# Patient Record
Sex: MALE | Race: WHITE | NOT HISPANIC OR LATINO | Employment: OTHER | URBAN - METROPOLITAN AREA
[De-identification: names, ages, dates, MRNs, and addresses within clinical notes are randomized per-mention and may not be internally consistent; named-entity substitution may affect disease eponyms.]

---

## 2017-02-08 ENCOUNTER — OFFICE VISIT (OUTPATIENT)
Dept: INTERNAL MEDICINE | Facility: CLINIC | Age: 62
End: 2017-02-08

## 2017-02-08 VITALS
HEART RATE: 82 BPM | TEMPERATURE: 97.8 F | BODY MASS INDEX: 26.69 KG/M2 | DIASTOLIC BLOOD PRESSURE: 82 MMHG | RESPIRATION RATE: 16 BRPM | OXYGEN SATURATION: 97 % | SYSTOLIC BLOOD PRESSURE: 122 MMHG | WEIGHT: 186 LBS

## 2017-02-08 DIAGNOSIS — M54.6 TRIGGER POINT OF THORACIC REGION: Primary | ICD-10-CM

## 2017-02-08 DIAGNOSIS — M79.18 MUSCULOSKELETAL PAIN: ICD-10-CM

## 2017-02-08 PROCEDURE — 99213 OFFICE O/P EST LOW 20 MIN: CPT | Performed by: NURSE PRACTITIONER

## 2017-02-08 RX ORDER — CYCLOBENZAPRINE HYDROCHLORIDE 7.5 MG/1
7.5 TABLET, FILM COATED ORAL 3 TIMES DAILY PRN
Qty: 30 TABLET | Refills: 0 | Status: SHIPPED | OUTPATIENT
Start: 2017-02-08 | End: 2017-03-01

## 2017-02-08 NOTE — PROGRESS NOTES
Vitals:    02/08/17 1435   BP: 122/82   Pulse: 82   Resp: 16   Temp: 97.8 °F (36.6 °C)   SpO2: 97%     Last 2 weights    02/08/17  1435   Weight: 186 lb (84.4 kg)     Social History   Substance Use Topics   • Smoking status: Never Smoker   • Smokeless tobacco: Not on file   • Alcohol use Yes       Subjective     HPI  Pt presents to office today with right sided sub scapular pain that started occurring 2-3 weeks ago. When moving right arm in certain direction with cause pain to radiate along the right lateral side of pt. He states he may have push/pulled a drill that was heavier than he anticipated and could be the cause of the injury. He denies rashes, fever, elevation in BP. Pt has full ROM still and able to perform daily activities. Denies numbness, tingling, or loss of sensation. He has not taken anything OTC for pain, and has not tried heat therapy.    The following portions of the patient's history were reviewed and updated as appropriate: allergies, current medications, past medical history, past social history and problem list.    Review of Systems   Constitutional: Negative.    Respiratory: Negative.    Cardiovascular: Negative.    Musculoskeletal:        Right subscapular pain       Objective     Physical Exam   Constitutional: He is oriented to person, place, and time. He appears well-developed and well-nourished.   HENT:   Head: Normocephalic and atraumatic.   Neck: Normal range of motion.   Cardiovascular: Normal rate, regular rhythm and normal heart sounds.    Pulmonary/Chest: Effort normal and breath sounds normal.   Musculoskeletal: Normal range of motion.        Thoracic back: He exhibits tenderness and pain.        Back:    trigger point musculoskeletal pain. Able to reproduce pain and feel the tension of the muscle under scapula   Neurological: He is alert and oriented to person, place, and time.   Nursing note and vitals reviewed.      Assessment/Plan   Shankar was seen today for back pain and  shoulder pain.    Diagnoses and all orders for this visit:    Trigger point of thoracic region    Musculoskeletal pain    Other orders  -     cyclobenzaprine (FEXMID) 7.5 MG tablet; Take 1 tablet by mouth 3 (Three) Times a Day As Needed for muscle spasms.           -flexeril tid prn  -heating pad  -avoid heavy lifting, pulling, or pushing  -gentle stretches to loosen muscle  -in a couple week try massage therapy  -cont home meds  -FU prn or if symptoms persist/worsen

## 2017-03-01 ENCOUNTER — OFFICE VISIT (OUTPATIENT)
Dept: INTERNAL MEDICINE | Facility: CLINIC | Age: 62
End: 2017-03-01

## 2017-03-01 VITALS
SYSTOLIC BLOOD PRESSURE: 132 MMHG | DIASTOLIC BLOOD PRESSURE: 78 MMHG | BODY MASS INDEX: 26.26 KG/M2 | RESPIRATION RATE: 16 BRPM | WEIGHT: 183 LBS | TEMPERATURE: 97.6 F | HEART RATE: 84 BPM

## 2017-03-01 DIAGNOSIS — E78.49 OTHER HYPERLIPIDEMIA: ICD-10-CM

## 2017-03-01 DIAGNOSIS — I10 ESSENTIAL HYPERTENSION: ICD-10-CM

## 2017-03-01 DIAGNOSIS — Z00.00 HEALTH CARE MAINTENANCE: Primary | ICD-10-CM

## 2017-03-01 DIAGNOSIS — M50.30 DDD (DEGENERATIVE DISC DISEASE), CERVICAL: ICD-10-CM

## 2017-03-01 DIAGNOSIS — E11.9 NON-INSULIN DEPENDENT TYPE 2 DIABETES MELLITUS (HCC): ICD-10-CM

## 2017-03-01 PROCEDURE — 90715 TDAP VACCINE 7 YRS/> IM: CPT | Performed by: INTERNAL MEDICINE

## 2017-03-01 PROCEDURE — 90472 IMMUNIZATION ADMIN EACH ADD: CPT | Performed by: INTERNAL MEDICINE

## 2017-03-01 PROCEDURE — 90670 PCV13 VACCINE IM: CPT | Performed by: INTERNAL MEDICINE

## 2017-03-01 PROCEDURE — 90471 IMMUNIZATION ADMIN: CPT | Performed by: INTERNAL MEDICINE

## 2017-03-01 PROCEDURE — 99214 OFFICE O/P EST MOD 30 MIN: CPT | Performed by: INTERNAL MEDICINE

## 2017-03-01 PROCEDURE — 99396 PREV VISIT EST AGE 40-64: CPT | Performed by: INTERNAL MEDICINE

## 2017-03-01 NOTE — PROGRESS NOTES
Subjective   Shankar Daley is a 61 y.o. male.   He is here today for complete physical exam along with NIDDM as well as hyperlipidemia hypertension cervical DDD and he has no new complaints  History of Present Illness   He is here today for complete physical exam along with NIDDM as well as hyperlipidemia hypertension cervical DDD and he has no new complaints  The following portions of the patient's history were reviewed and updated as appropriate: allergies, current medications, past family history, past medical history, past social history, past surgical history and problem list.    Review of Systems   All other systems reviewed and are negative.      Objective   Physical Exam   Constitutional: He is oriented to person, place, and time. Vital signs are normal. He appears well-developed and well-nourished. He is active.   HENT:   Head: Normocephalic and atraumatic.   Right Ear: Hearing, tympanic membrane, external ear and ear canal normal.   Left Ear: Hearing, tympanic membrane, external ear and ear canal normal.   Nose: Nose normal.   Mouth/Throat: Oropharynx is clear and moist.   Eyes: Conjunctivae, EOM and lids are normal. Pupils are equal, round, and reactive to light. Right eye exhibits no discharge. Left eye exhibits no discharge.   Neck: Trachea normal, normal range of motion, full passive range of motion without pain and phonation normal. Neck supple. Carotid bruit is not present. No edema present. No thyroid mass and no thyromegaly present.   Cardiovascular: Normal rate, regular rhythm, normal heart sounds, intact distal pulses and normal pulses.  Exam reveals no gallop and no friction rub.    No murmur heard.  Pulmonary/Chest: Effort normal and breath sounds normal. No respiratory distress. He has no wheezes. He has no rales.   Abdominal: Soft. Normal appearance, normal aorta and bowel sounds are normal. He exhibits no distension, no abdominal bruit and no mass. There is no hepatosplenomegaly. There is no  tenderness. There is no rebound, no guarding and no CVA tenderness. No hernia. Hernia confirmed negative in the right inguinal area and confirmed negative in the left inguinal area.   Musculoskeletal: Normal range of motion. He exhibits no edema or tenderness.    Shankar had a diabetic foot exam performed today.   During the foot exam he had a monofilament test performed (nl).    Vascular Status -  His exam exhibits right foot vasculature normal. His exam exhibits no right foot edema. His exam exhibits left foot vasculature normal. His exam exhibits no left foot edema.   Skin Integrity  -  His right foot skin is intact.     Shankar 's left foot skin is intact. .  Lymphadenopathy:     He has no cervical adenopathy.     He has no axillary adenopathy.        Right: No inguinal and no supraclavicular adenopathy present.        Left: No inguinal and no supraclavicular adenopathy present.   Neurological: He is alert and oriented to person, place, and time. He has normal strength. No cranial nerve deficit or sensory deficit. He exhibits normal muscle tone. He displays a negative Romberg sign. Coordination normal.   Skin: Skin is warm, dry and intact. No cyanosis. Nails show no clubbing.   Psychiatric: He has a normal mood and affect. His speech is normal and behavior is normal. Judgment and thought content normal. Cognition and memory are normal.   Nursing note and vitals reviewed.      Assessment/Plan   Diagnoses and all orders for this visit:    Health care maintenance  -     Hemoglobin A1c  -     CBC & Differential  -     Comprehensive Metabolic Panel  -     T4, Free  -     TSH  -     MicroAlbumin, Urine, Random  -     Lipid Panel With LDL / HDL Ratio  -     Urinalysis With Microscopic    Non-insulin dependent type 2 diabetes mellitus  -     Hemoglobin A1c  -     CBC & Differential  -     Comprehensive Metabolic Panel  -     T4, Free  -     TSH  -     MicroAlbumin, Urine, Random  -     Lipid Panel With LDL / HDL Ratio  -      Urinalysis With Microscopic    Other hyperlipidemia  -     Hemoglobin A1c  -     CBC & Differential  -     Comprehensive Metabolic Panel  -     T4, Free  -     TSH  -     MicroAlbumin, Urine, Random  -     Lipid Panel With LDL / HDL Ratio  -     Urinalysis With Microscopic    Essential hypertension  -     Hemoglobin A1c  -     CBC & Differential  -     Comprehensive Metabolic Panel  -     T4, Free  -     TSH  -     MicroAlbumin, Urine, Random  -     Lipid Panel With LDL / HDL Ratio  -     Urinalysis With Microscopic    DDD (degenerative disc disease), cervical  -     Hemoglobin A1c  -     CBC & Differential  -     Comprehensive Metabolic Panel  -     T4, Free  -     TSH  -     MicroAlbumin, Urine, Random  -     Lipid Panel With LDL / HDL Ratio  -     Urinalysis With Microscopic    Other orders  -     Tdap Vaccine Greater Than or Equal To 8yo IM  -     Pneumococcal Conjugate Vaccine 13-Valent All  -     Manual Differential  -     Microscopic Examination      Healthcare maintenance fasting labs vaccines colonoscopies on a regular basis  NIDDM follow hemoglobin A1c with yearly ophthalmology visits  Hypertension well-controlled on current medication  Cervical DDD stable  Hyper lipidemia keep LDL less than 70 with proper diet exercise medication

## 2017-03-06 LAB
ALBUMIN SERPL-MCNC: 4.9 G/DL (ref 3.5–5.2)
ALBUMIN/GLOB SERPL: 2.2 G/DL
ALP SERPL-CCNC: 59 U/L (ref 39–117)
ALT SERPL-CCNC: 28 U/L (ref 1–41)
APPEARANCE UR: CLEAR
AST SERPL-CCNC: 17 U/L (ref 1–40)
BACTERIA #/AREA URNS HPF: NORMAL /HPF
BASOPHILS # BLD AUTO: 0.04 10*3/MM3 (ref 0–0.2)
BASOPHILS NFR BLD AUTO: 0.6 % (ref 0–1.5)
BILIRUB SERPL-MCNC: 1.1 MG/DL (ref 0.1–1.2)
BILIRUB UR QL STRIP: NEGATIVE
BUN SERPL-MCNC: 14 MG/DL (ref 8–23)
BUN/CREAT SERPL: 18.2 (ref 7–25)
CALCIUM SERPL-MCNC: 10.2 MG/DL (ref 8.6–10.5)
CHLORIDE SERPL-SCNC: 100 MMOL/L (ref 98–107)
CHOLEST SERPL-MCNC: 180 MG/DL (ref 0–200)
CO2 SERPL-SCNC: 26.4 MMOL/L (ref 22–29)
COLOR UR: YELLOW
CREAT SERPL-MCNC: 0.77 MG/DL (ref 0.76–1.27)
DIFFERENTIAL COMMENT: NORMAL
EOSINOPHIL # BLD AUTO: 0.17 10*3/MM3 (ref 0–0.7)
EOSINOPHIL NFR BLD AUTO: 2.6 % (ref 0.3–6.2)
EPI CELLS #/AREA URNS HPF: NORMAL /HPF
ERYTHROCYTE [DISTWIDTH] IN BLOOD BY AUTOMATED COUNT: 12.5 % (ref 11.5–14.5)
GLOBULIN SER CALC-MCNC: 2.2 GM/DL
GLUCOSE SERPL-MCNC: 124 MG/DL (ref 65–99)
GLUCOSE UR QL: NEGATIVE
HBA1C MFR BLD: 5.65 % (ref 4.8–5.6)
HCT VFR BLD AUTO: 44.3 % (ref 40.4–52.2)
HDLC SERPL-MCNC: 74 MG/DL (ref 40–60)
HGB BLD-MCNC: 15.2 G/DL (ref 13.7–17.6)
HGB UR QL STRIP: NEGATIVE
IMM GRANULOCYTES # BLD: 0.05 10*3/MM3 (ref 0–0.03)
IMM GRANULOCYTES NFR BLD: 0.8 % (ref 0–0.5)
KETONES UR QL STRIP: NEGATIVE
LDLC SERPL CALC-MCNC: 83 MG/DL (ref 0–100)
LDLC/HDLC SERPL: 1.12 {RATIO}
LEUKOCYTE ESTERASE UR QL STRIP: NEGATIVE
LYMPHOCYTES # BLD AUTO: 1.95 10*3/MM3 (ref 0.9–4.8)
LYMPHOCYTES NFR BLD AUTO: 30.2 % (ref 19.6–45.3)
MCH RBC QN AUTO: 31.1 PG (ref 27–32.7)
MCHC RBC AUTO-ENTMCNC: 34.3 G/DL (ref 32.6–36.4)
MCV RBC AUTO: 90.6 FL (ref 79.8–96.2)
MICROALBUMIN UR-MCNC: <3 UG/ML
MONOCYTES # BLD AUTO: 0.66 10*3/MM3 (ref 0.2–1.2)
MONOCYTES NFR BLD AUTO: 10.2 % (ref 5–12)
NEUTROPHILS # BLD AUTO: 3.59 10*3/MM3 (ref 1.9–8.1)
NEUTROPHILS NFR BLD AUTO: 55.6 % (ref 42.7–76)
NITRITE UR QL STRIP: NEGATIVE
NRBC BLD AUTO-RTO: 0 /100 WBC (ref 0–0)
PH UR STRIP: 6 [PH] (ref 5–8)
PLATELET # BLD AUTO: 196 10*3/MM3 (ref 140–500)
PLATELET BLD QL SMEAR: NORMAL
POTASSIUM SERPL-SCNC: 4.4 MMOL/L (ref 3.5–5.2)
PROT SERPL-MCNC: 7.1 G/DL (ref 6–8.5)
PROT UR QL STRIP: NEGATIVE
RBC # BLD AUTO: 4.89 10*6/MM3 (ref 4.6–6)
RBC #/AREA URNS HPF: NORMAL /HPF
RBC MORPH BLD: NORMAL
SODIUM SERPL-SCNC: 141 MMOL/L (ref 136–145)
SP GR UR: 1.02 (ref 1–1.03)
T4 FREE SERPL-MCNC: 1.4 NG/DL (ref 0.93–1.7)
TRIGL SERPL-MCNC: 115 MG/DL (ref 0–150)
TSH SERPL DL<=0.005 MIU/L-ACNC: 2.13 MIU/ML (ref 0.27–4.2)
UROBILINOGEN UR STRIP-MCNC: (no result) MG/DL
VLDLC SERPL CALC-MCNC: 23 MG/DL (ref 5–40)
WBC # BLD AUTO: 6.46 10*3/MM3 (ref 4.5–10.7)
WBC #/AREA URNS HPF: NORMAL /HPF

## 2017-05-15 RX ORDER — ATORVASTATIN CALCIUM 40 MG/1
TABLET, FILM COATED ORAL
Qty: 90 TABLET | Refills: 0 | Status: SHIPPED | OUTPATIENT
Start: 2017-05-15 | End: 2017-08-13 | Stop reason: SDUPTHER

## 2017-07-07 RX ORDER — AMLODIPINE BESYLATE AND BENAZEPRIL HYDROCHLORIDE 10; 20 MG/1; MG/1
CAPSULE ORAL
Qty: 90 CAPSULE | Refills: 0 | Status: SHIPPED | OUTPATIENT
Start: 2017-07-07 | End: 2017-12-11 | Stop reason: SDUPTHER

## 2017-08-14 RX ORDER — ATORVASTATIN CALCIUM 40 MG/1
TABLET, FILM COATED ORAL
Qty: 90 TABLET | Refills: 1 | Status: SHIPPED | OUTPATIENT
Start: 2017-08-14 | End: 2018-02-10 | Stop reason: SDUPTHER

## 2017-09-08 ENCOUNTER — OFFICE VISIT (OUTPATIENT)
Dept: INTERNAL MEDICINE | Facility: CLINIC | Age: 62
End: 2017-09-08

## 2017-09-08 VITALS
BODY MASS INDEX: 27.54 KG/M2 | HEART RATE: 75 BPM | HEIGHT: 70 IN | DIASTOLIC BLOOD PRESSURE: 83 MMHG | OXYGEN SATURATION: 97 % | TEMPERATURE: 98.2 F | SYSTOLIC BLOOD PRESSURE: 148 MMHG | WEIGHT: 192.4 LBS

## 2017-09-08 DIAGNOSIS — J45.20 MILD INTERMITTENT ASTHMA WITHOUT COMPLICATION: ICD-10-CM

## 2017-09-08 DIAGNOSIS — E11.9 NON-INSULIN DEPENDENT TYPE 2 DIABETES MELLITUS (HCC): Primary | ICD-10-CM

## 2017-09-08 DIAGNOSIS — L30.8 OTHER ECZEMA: ICD-10-CM

## 2017-09-08 DIAGNOSIS — I10 ESSENTIAL HYPERTENSION: ICD-10-CM

## 2017-09-08 DIAGNOSIS — E78.49 OTHER HYPERLIPIDEMIA: ICD-10-CM

## 2017-09-08 PROCEDURE — 99214 OFFICE O/P EST MOD 30 MIN: CPT | Performed by: INTERNAL MEDICINE

## 2017-09-08 RX ORDER — HYDROCHLOROTHIAZIDE 25 MG/1
25 TABLET ORAL DAILY
Qty: 90 TABLET | Refills: 1 | Status: SHIPPED | OUTPATIENT
Start: 2017-09-08 | End: 2017-11-17

## 2017-09-08 RX ORDER — ALBUTEROL SULFATE 90 UG/1
2 AEROSOL, METERED RESPIRATORY (INHALATION) EVERY 6 HOURS PRN
Qty: 5 INHALER | Refills: 3 | Status: SHIPPED | OUTPATIENT
Start: 2017-09-08

## 2017-09-09 LAB
ALBUMIN SERPL-MCNC: 5.3 G/DL (ref 3.5–5.2)
ALBUMIN/GLOB SERPL: 2 G/DL
ALP SERPL-CCNC: 68 U/L (ref 39–117)
ALT SERPL-CCNC: 45 U/L (ref 1–41)
APPEARANCE UR: CLEAR
AST SERPL-CCNC: 26 U/L (ref 1–40)
BACTERIA #/AREA URNS HPF: NORMAL /HPF
BASOPHILS # BLD AUTO: 0.08 10*3/MM3 (ref 0–0.2)
BASOPHILS NFR BLD AUTO: 1.1 % (ref 0–1.5)
BILIRUB SERPL-MCNC: 1.3 MG/DL (ref 0.1–1.2)
BILIRUB UR QL STRIP: NEGATIVE
BUN SERPL-MCNC: 15 MG/DL (ref 8–23)
BUN/CREAT SERPL: 18.8 (ref 7–25)
CALCIUM SERPL-MCNC: 10.5 MG/DL (ref 8.6–10.5)
CASTS URNS MICRO: NORMAL
CHLORIDE SERPL-SCNC: 99 MMOL/L (ref 98–107)
CHOLEST SERPL-MCNC: 201 MG/DL (ref 0–200)
CO2 SERPL-SCNC: 28.5 MMOL/L (ref 22–29)
COLOR UR: YELLOW
CREAT SERPL-MCNC: 0.8 MG/DL (ref 0.76–1.27)
EOSINOPHIL # BLD AUTO: 0.17 10*3/MM3 (ref 0–0.7)
EOSINOPHIL NFR BLD AUTO: 2.4 % (ref 0.3–6.2)
EPI CELLS #/AREA URNS HPF: NORMAL /HPF
ERYTHROCYTE [DISTWIDTH] IN BLOOD BY AUTOMATED COUNT: 12.8 % (ref 11.5–14.5)
GLOBULIN SER CALC-MCNC: 2.7 GM/DL
GLUCOSE SERPL-MCNC: 131 MG/DL (ref 65–99)
GLUCOSE UR QL: NEGATIVE
HBA1C MFR BLD: 5.87 % (ref 4.8–5.6)
HCT VFR BLD AUTO: 48.9 % (ref 40.4–52.2)
HDLC SERPL-MCNC: 72 MG/DL (ref 40–60)
HGB BLD-MCNC: 16.5 G/DL (ref 13.7–17.6)
HGB UR QL STRIP: NEGATIVE
IMM GRANULOCYTES # BLD: 0.09 10*3/MM3 (ref 0–0.03)
IMM GRANULOCYTES NFR BLD: 1.3 % (ref 0–0.5)
KETONES UR QL STRIP: NEGATIVE
LDLC SERPL CALC-MCNC: 93 MG/DL (ref 0–100)
LDLC/HDLC SERPL: 1.29 {RATIO}
LEUKOCYTE ESTERASE UR QL STRIP: (no result)
LYMPHOCYTES # BLD AUTO: 2.36 10*3/MM3 (ref 0.9–4.8)
LYMPHOCYTES NFR BLD AUTO: 33.3 % (ref 19.6–45.3)
MCH RBC QN AUTO: 32.2 PG (ref 27–32.7)
MCHC RBC AUTO-ENTMCNC: 33.7 G/DL (ref 32.6–36.4)
MCV RBC AUTO: 95.3 FL (ref 79.8–96.2)
MICROALBUMIN UR-MCNC: 3.8 UG/ML
MONOCYTES # BLD AUTO: 0.8 10*3/MM3 (ref 0.2–1.2)
MONOCYTES NFR BLD AUTO: 11.3 % (ref 5–12)
NEUTROPHILS # BLD AUTO: 3.59 10*3/MM3 (ref 1.9–8.1)
NEUTROPHILS NFR BLD AUTO: 50.6 % (ref 42.7–76)
NITRITE UR QL STRIP: NEGATIVE
PH UR STRIP: 6 [PH] (ref 5–8)
PLATELET # BLD AUTO: 203 10*3/MM3 (ref 140–500)
POTASSIUM SERPL-SCNC: 4.8 MMOL/L (ref 3.5–5.2)
PROT SERPL-MCNC: 8 G/DL (ref 6–8.5)
PROT UR QL STRIP: NEGATIVE
RBC # BLD AUTO: 5.13 10*6/MM3 (ref 4.6–6)
RBC #/AREA URNS HPF: NORMAL /HPF
SODIUM SERPL-SCNC: 142 MMOL/L (ref 136–145)
SP GR UR: 1.01 (ref 1–1.03)
T4 FREE SERPL-MCNC: 1.37 NG/DL (ref 0.93–1.7)
TRIGL SERPL-MCNC: 180 MG/DL (ref 0–150)
TSH SERPL DL<=0.005 MIU/L-ACNC: 2.36 MIU/ML (ref 0.27–4.2)
UROBILINOGEN UR STRIP-MCNC: (no result) MG/DL
VLDLC SERPL CALC-MCNC: 36 MG/DL (ref 5–40)
WBC # BLD AUTO: 7.09 10*3/MM3 (ref 4.5–10.7)
WBC #/AREA URNS HPF: NORMAL /HPF

## 2017-09-23 NOTE — PROGRESS NOTES
Subjective   Shankar Daley is a 62 y.o. male.   He is here today for NIDDM hyperlipidemia hypertension asthma eczema and he has no new complaints  History of Present Illness   He is here today for NIDDM hyper lipidemia hypertension asthma eczema with no new complaints  The following portions of the patient's history were reviewed and updated as appropriate: allergies, current medications, past family history, past medical history, past social history, past surgical history and problem list.    Review of Systems   All other systems reviewed and are negative.      Objective   Physical Exam   Constitutional: He is oriented to person, place, and time. He appears well-developed and well-nourished. He is cooperative.   HENT:   Head: Normocephalic and atraumatic.   Right Ear: Hearing, tympanic membrane, external ear and ear canal normal.   Left Ear: Hearing, tympanic membrane, external ear and ear canal normal.   Nose: Nose normal.   Mouth/Throat: Uvula is midline, oropharynx is clear and moist and mucous membranes are normal.   Eyes: Conjunctivae, EOM and lids are normal. Pupils are equal, round, and reactive to light.   Neck: Phonation normal. Neck supple. Carotid bruit is not present.   Cardiovascular: Normal rate, regular rhythm and normal heart sounds.  Exam reveals no gallop and no friction rub.    No murmur heard.  Pulmonary/Chest: Effort normal and breath sounds normal. No respiratory distress.   Abdominal: Soft. Bowel sounds are normal. He exhibits no distension and no mass. There is no hepatosplenomegaly. There is no tenderness. There is no rebound and no guarding. No hernia.   Musculoskeletal: He exhibits no edema.   Neurological: He is alert and oriented to person, place, and time. Coordination and gait normal.   Skin: Skin is warm and dry.   Psychiatric: He has a normal mood and affect. His speech is normal and behavior is normal. Judgment and thought content normal.   Nursing note and vitals  reviewed.      Assessment/Plan   Diagnoses and all orders for this visit:    Non-insulin dependent type 2 diabetes mellitus  -     Hemoglobin A1c  -     Lipid Panel With LDL / HDL Ratio  -     MicroAlbumin, Urine, Random  -     CBC & Differential  -     Comprehensive Metabolic Panel  -     T4, Free  -     TSH  -     Urinalysis With Microscopic    Other hyperlipidemia  -     Hemoglobin A1c  -     Lipid Panel With LDL / HDL Ratio  -     MicroAlbumin, Urine, Random  -     CBC & Differential  -     Comprehensive Metabolic Panel  -     T4, Free  -     TSH  -     Urinalysis With Microscopic    Essential hypertension  -     Hemoglobin A1c  -     Lipid Panel With LDL / HDL Ratio  -     MicroAlbumin, Urine, Random  -     CBC & Differential  -     Comprehensive Metabolic Panel  -     T4, Free  -     TSH  -     Urinalysis With Microscopic    Mild intermittent asthma without complication  -     Hemoglobin A1c  -     Lipid Panel With LDL / HDL Ratio  -     MicroAlbumin, Urine, Random  -     CBC & Differential  -     Comprehensive Metabolic Panel  -     T4, Free  -     TSH  -     Urinalysis With Microscopic    Other eczema  -     Hemoglobin A1c  -     Lipid Panel With LDL / HDL Ratio  -     MicroAlbumin, Urine, Random  -     CBC & Differential  -     Comprehensive Metabolic Panel  -     T4, Free  -     TSH  -     Urinalysis With Microscopic    Other orders  -     hydrochlorothiazide (HYDRODIURIL) 25 MG tablet; Take 1 tablet by mouth Daily.  -     albuterol (PROAIR HFA) 108 (90 Base) MCG/ACT inhaler; Inhale 2 puffs Every 6 (Six) Hours As Needed for Wheezing.  -     Microscopic Examination      NIDDM follow hemoglobin A1 C with yearly ophthalmology visits  Asthma stable on current medication  Eczema stable on current medication  Hypertension medication as needed  Hyperlipidemia keep LDL less than 70 with proper diet exercise medication

## 2017-11-17 ENCOUNTER — OFFICE VISIT (OUTPATIENT)
Dept: INTERNAL MEDICINE | Facility: CLINIC | Age: 62
End: 2017-11-17

## 2017-11-17 VITALS
HEART RATE: 74 BPM | HEIGHT: 70 IN | WEIGHT: 192 LBS | TEMPERATURE: 98.4 F | OXYGEN SATURATION: 98 % | BODY MASS INDEX: 27.49 KG/M2 | SYSTOLIC BLOOD PRESSURE: 157 MMHG | DIASTOLIC BLOOD PRESSURE: 81 MMHG

## 2017-11-17 DIAGNOSIS — T50.2X5A ADVERSE EFFECT OF HYDROCHLOROTHIAZIDE, INITIAL ENCOUNTER: Primary | ICD-10-CM

## 2017-11-17 DIAGNOSIS — I10 ESSENTIAL HYPERTENSION: ICD-10-CM

## 2017-11-17 DIAGNOSIS — E11.9 NON-INSULIN DEPENDENT TYPE 2 DIABETES MELLITUS (HCC): ICD-10-CM

## 2017-11-17 DIAGNOSIS — E78.2 MIXED HYPERLIPIDEMIA: ICD-10-CM

## 2017-11-17 PROCEDURE — 99214 OFFICE O/P EST MOD 30 MIN: CPT | Performed by: INTERNAL MEDICINE

## 2017-11-17 RX ORDER — CLONIDINE HYDROCHLORIDE 0.1 MG/1
0.1 TABLET ORAL 2 TIMES DAILY
Qty: 60 TABLET | Refills: 1 | Status: SHIPPED | OUTPATIENT
Start: 2017-11-17 | End: 2018-02-05 | Stop reason: SDUPTHER

## 2017-11-18 LAB
C PEPTIDE SERPL-MCNC: 1.7 NG/ML (ref 1.1–4.4)
CHOLEST SERPL-MCNC: 179 MG/DL (ref 0–200)
HBA1C MFR BLD: 5.84 % (ref 4.8–5.6)
HDLC SERPL-MCNC: 80 MG/DL (ref 40–60)
LDLC SERPL CALC-MCNC: 83 MG/DL (ref 0–100)
LDLC/HDLC SERPL: 1.04 {RATIO}
PSA SERPL-MCNC: 3.39 NG/ML (ref 0–4)
TRIGL SERPL-MCNC: 79 MG/DL (ref 0–150)
VLDLC SERPL CALC-MCNC: 15.8 MG/DL (ref 5–40)

## 2017-11-26 NOTE — PROGRESS NOTES
Subjective   Shankar Daley is a 62 y.o. male.   He is here today with adverse effect of hydrochlorothiazide NIDDM type II hypertension mixed hyperlipidemia and he had a rash from hydrochlorothiazide throughout his body  History of Present Illness   He is here today for adverse effect of hydrochlorothiazide along with NIDDM type II hypertension mixed hyperlipidemia and discovered a rash from hydrochlorothiazide  The following portions of the patient's history were reviewed and updated as appropriate: allergies, current medications, past family history, past medical history, past social history, past surgical history and problem list.    Review of Systems   Skin: Positive for rash.   All other systems reviewed and are negative.      Objective   Physical Exam   Constitutional: He is oriented to person, place, and time. He appears well-developed and well-nourished. He is cooperative.   HENT:   Head: Normocephalic and atraumatic.   Right Ear: Hearing, tympanic membrane, external ear and ear canal normal.   Left Ear: Hearing, tympanic membrane, external ear and ear canal normal.   Nose: Nose normal.   Mouth/Throat: Uvula is midline, oropharynx is clear and moist and mucous membranes are normal.   Eyes: Conjunctivae, EOM and lids are normal. Pupils are equal, round, and reactive to light.   Neck: Phonation normal. Neck supple. Carotid bruit is not present.   Cardiovascular: Normal rate, regular rhythm and normal heart sounds.  Exam reveals no gallop and no friction rub.    No murmur heard.  Pulmonary/Chest: Effort normal and breath sounds normal. No respiratory distress.   Abdominal: Soft. Bowel sounds are normal. He exhibits no distension and no mass. There is no hepatosplenomegaly. There is no tenderness. There is no rebound and no guarding. No hernia.   Musculoskeletal: He exhibits no edema.   Neurological: He is alert and oriented to person, place, and time. Coordination and gait normal.   Skin: Skin is warm and dry.  Rash noted. Rash is maculopapular (throughout body mainly on trunk).   Psychiatric: He has a normal mood and affect. His speech is normal and behavior is normal. Judgment and thought content normal.   Nursing note and vitals reviewed.      Assessment/Plan   Diagnoses and all orders for this visit:    Adverse effect of hydrochlorothiazide, initial encounter  -     Hemoglobin A1c  -     Lipid Panel With LDL / HDL Ratio  -     PSA  -     C-Peptide    Non-insulin dependent type 2 diabetes mellitus  -     Hemoglobin A1c  -     Lipid Panel With LDL / HDL Ratio  -     PSA  -     C-Peptide    Essential hypertension  -     Hemoglobin A1c  -     Lipid Panel With LDL / HDL Ratio  -     PSA  -     C-Peptide    Mixed hyperlipidemia  -     Hemoglobin A1c  -     Lipid Panel With LDL / HDL Ratio  -     PSA  -     C-Peptide    Other orders  -     CloNIDine (CATAPRES) 0.1 MG tablet; Take 1 tablet by mouth 2 (Two) Times a Day.      Adverse effect from on chlorothiazide he did have maculopapular rash we will stop this and promote antihistamines  Non-insulin-dependent type 2 diabetes follow hemoglobin A1c with yearly ophthalmology visits and continue on current medication including metformin  Hypertension we will change him on chlorothiazide to clonidine and go from there  Mixed hyper lipidemia keep LDL less than 70 with proper diet exercise medication and check lipid panel on a regular basis

## 2017-12-11 RX ORDER — AMLODIPINE BESYLATE AND BENAZEPRIL HYDROCHLORIDE 10; 20 MG/1; MG/1
CAPSULE ORAL
Qty: 90 CAPSULE | Refills: 0 | Status: SHIPPED | OUTPATIENT
Start: 2017-12-11 | End: 2022-07-27 | Stop reason: HOSPADM

## 2018-02-05 RX ORDER — CLONIDINE HYDROCHLORIDE 0.1 MG/1
TABLET ORAL
Qty: 60 TABLET | Refills: 0 | Status: SHIPPED | OUTPATIENT
Start: 2018-02-05 | End: 2018-03-08 | Stop reason: SDUPTHER

## 2018-02-12 RX ORDER — ATORVASTATIN CALCIUM 40 MG/1
TABLET, FILM COATED ORAL
Qty: 90 TABLET | Refills: 1 | Status: SHIPPED | OUTPATIENT
Start: 2018-02-12 | End: 2018-07-21 | Stop reason: CLARIF

## 2018-03-08 RX ORDER — CLONIDINE HYDROCHLORIDE 0.1 MG/1
TABLET ORAL
Qty: 60 TABLET | Refills: 0 | Status: SHIPPED | OUTPATIENT
Start: 2018-03-08 | End: 2018-04-10 | Stop reason: SDUPTHER

## 2018-03-23 RX ORDER — AMLODIPINE BESYLATE AND BENAZEPRIL HYDROCHLORIDE 10; 20 MG/1; MG/1
1 CAPSULE ORAL DAILY
Qty: 30 CAPSULE | Refills: 5 | Status: SHIPPED | OUTPATIENT
Start: 2018-03-23 | End: 2018-07-21

## 2018-03-23 RX ORDER — AMLODIPINE BESYLATE AND BENAZEPRIL HYDROCHLORIDE 10; 20 MG/1; MG/1
CAPSULE ORAL
Qty: 30 CAPSULE | Refills: 0 | Status: SHIPPED | OUTPATIENT
Start: 2018-03-23 | End: 2018-03-23 | Stop reason: SDUPTHER

## 2018-04-10 RX ORDER — CLONIDINE HYDROCHLORIDE 0.1 MG/1
TABLET ORAL
Qty: 60 TABLET | Refills: 0 | Status: SHIPPED | OUTPATIENT
Start: 2018-04-10 | End: 2018-06-01 | Stop reason: SDUPTHER

## 2018-06-01 RX ORDER — CLONIDINE HYDROCHLORIDE 0.1 MG/1
TABLET ORAL
Qty: 60 TABLET | Refills: 0 | Status: SHIPPED | OUTPATIENT
Start: 2018-06-01 | End: 2018-09-07 | Stop reason: SDUPTHER

## 2018-07-21 ENCOUNTER — HOSPITAL ENCOUNTER (EMERGENCY)
Facility: HOSPITAL | Age: 63
Discharge: HOME | End: 2018-07-21
Attending: EMERGENCY MEDICINE | Admitting: EMERGENCY MEDICINE

## 2018-07-21 ENCOUNTER — APPOINTMENT (OUTPATIENT)
Dept: CT IMAGING | Facility: HOSPITAL | Age: 63
End: 2018-07-21

## 2018-07-21 VITALS
DIASTOLIC BLOOD PRESSURE: 75 MMHG | SYSTOLIC BLOOD PRESSURE: 131 MMHG | OXYGEN SATURATION: 99 % | BODY MASS INDEX: 26.48 KG/M2 | HEIGHT: 70 IN | WEIGHT: 185 LBS | TEMPERATURE: 98 F | RESPIRATION RATE: 14 BRPM | HEART RATE: 64 BPM

## 2018-07-21 DIAGNOSIS — R10.32 LEFT LOWER QUADRANT PAIN: Primary | ICD-10-CM

## 2018-07-21 LAB
ALBUMIN SERPL-MCNC: 5.2 G/DL (ref 3.5–5.2)
ALBUMIN/GLOB SERPL: 2 G/DL
ALP SERPL-CCNC: 59 U/L (ref 39–117)
ALT SERPL W P-5'-P-CCNC: 55 U/L (ref 1–41)
ANION GAP SERPL CALCULATED.3IONS-SCNC: 10.9 MMOL/L
AST SERPL-CCNC: 28 U/L (ref 1–40)
BACTERIA UR QL AUTO: ABNORMAL /HPF
BASOPHILS # BLD AUTO: 0.07 10*3/MM3 (ref 0–0.2)
BASOPHILS NFR BLD AUTO: 1.1 % (ref 0–1.5)
BILIRUB SERPL-MCNC: 0.7 MG/DL (ref 0.1–1.2)
BILIRUB UR QL STRIP: NEGATIVE
BUN BLD-MCNC: 14 MG/DL (ref 8–23)
BUN/CREAT SERPL: 17.9 (ref 7–25)
CALCIUM SPEC-SCNC: 9.5 MG/DL (ref 8.6–10.5)
CHLORIDE SERPL-SCNC: 101 MMOL/L (ref 98–107)
CLARITY UR: CLEAR
CO2 SERPL-SCNC: 27.1 MMOL/L (ref 22–29)
COLOR UR: YELLOW
CREAT BLD-MCNC: 0.78 MG/DL (ref 0.76–1.27)
DEPRECATED RDW RBC AUTO: 41.7 FL (ref 37–54)
EOSINOPHIL # BLD AUTO: 0.19 10*3/MM3 (ref 0–0.7)
EOSINOPHIL NFR BLD AUTO: 3.1 % (ref 0.3–6.2)
ERYTHROCYTE [DISTWIDTH] IN BLOOD BY AUTOMATED COUNT: 12.4 % (ref 11.5–14.5)
GFR SERPL CREATININE-BSD FRML MDRD: 100 ML/MIN/1.73
GLOBULIN UR ELPH-MCNC: 2.6 GM/DL
GLUCOSE BLD-MCNC: 156 MG/DL (ref 65–99)
GLUCOSE UR STRIP-MCNC: NEGATIVE MG/DL
HCT VFR BLD AUTO: 48 % (ref 40.4–52.2)
HGB BLD-MCNC: 16.4 G/DL (ref 13.7–17.6)
HGB UR QL STRIP.AUTO: ABNORMAL
HOLD SPECIMEN: NORMAL
HOLD SPECIMEN: NORMAL
HYALINE CASTS UR QL AUTO: ABNORMAL /LPF
IMM GRANULOCYTES # BLD: 0.12 10*3/MM3 (ref 0–0.03)
IMM GRANULOCYTES NFR BLD: 2 % (ref 0–0.5)
KETONES UR QL STRIP: NEGATIVE
LEUKOCYTE ESTERASE UR QL STRIP.AUTO: ABNORMAL
LIPASE SERPL-CCNC: 54 U/L (ref 13–60)
LYMPHOCYTES # BLD AUTO: 2.03 10*3/MM3 (ref 0.9–4.8)
LYMPHOCYTES NFR BLD AUTO: 33.2 % (ref 19.6–45.3)
MCH RBC QN AUTO: 31.6 PG (ref 27–32.7)
MCHC RBC AUTO-ENTMCNC: 34.2 G/DL (ref 32.6–36.4)
MCV RBC AUTO: 92.5 FL (ref 79.8–96.2)
MONOCYTES # BLD AUTO: 0.59 10*3/MM3 (ref 0.2–1.2)
MONOCYTES NFR BLD AUTO: 9.6 % (ref 5–12)
MUCOUS THREADS URNS QL MICRO: ABNORMAL /HPF
NEUTROPHILS # BLD AUTO: 3.12 10*3/MM3 (ref 1.9–8.1)
NEUTROPHILS NFR BLD AUTO: 51 % (ref 42.7–76)
NITRITE UR QL STRIP: NEGATIVE
NRBC BLD MANUAL-RTO: 0 /100 WBC (ref 0–0)
PH UR STRIP.AUTO: <=5 [PH] (ref 5–8)
PLAT MORPH BLD: NORMAL
PLATELET # BLD AUTO: 176 10*3/MM3 (ref 140–500)
PMV BLD AUTO: 10.2 FL (ref 6–12)
POTASSIUM BLD-SCNC: 4.1 MMOL/L (ref 3.5–5.2)
PROT SERPL-MCNC: 7.8 G/DL (ref 6–8.5)
PROT UR QL STRIP: ABNORMAL
RBC # BLD AUTO: 5.19 10*6/MM3 (ref 4.6–6)
RBC # UR: ABNORMAL /HPF
RBC MORPH BLD: NORMAL
REF LAB TEST METHOD: ABNORMAL
SODIUM BLD-SCNC: 139 MMOL/L (ref 136–145)
SP GR UR STRIP: 1.02 (ref 1–1.03)
SQUAMOUS #/AREA URNS HPF: ABNORMAL /HPF
UROBILINOGEN UR QL STRIP: ABNORMAL
WBC MORPH BLD: NORMAL
WBC NRBC COR # BLD: 6.12 10*3/MM3 (ref 4.5–10.7)
WBC UR QL AUTO: ABNORMAL /HPF
WHOLE BLOOD HOLD SPECIMEN: NORMAL
WHOLE BLOOD HOLD SPECIMEN: NORMAL

## 2018-07-21 PROCEDURE — 25010000002 KETOROLAC TROMETHAMINE PER 15 MG: Performed by: EMERGENCY MEDICINE

## 2018-07-21 PROCEDURE — 85025 COMPLETE CBC W/AUTO DIFF WBC: CPT | Performed by: NURSE PRACTITIONER

## 2018-07-21 PROCEDURE — 74177 CT ABD & PELVIS W/CONTRAST: CPT

## 2018-07-21 PROCEDURE — 96361 HYDRATE IV INFUSION ADD-ON: CPT

## 2018-07-21 PROCEDURE — 83690 ASSAY OF LIPASE: CPT | Performed by: NURSE PRACTITIONER

## 2018-07-21 PROCEDURE — 81001 URINALYSIS AUTO W/SCOPE: CPT | Performed by: EMERGENCY MEDICINE

## 2018-07-21 PROCEDURE — 85007 BL SMEAR W/DIFF WBC COUNT: CPT | Performed by: NURSE PRACTITIONER

## 2018-07-21 PROCEDURE — 80053 COMPREHEN METABOLIC PANEL: CPT | Performed by: NURSE PRACTITIONER

## 2018-07-21 PROCEDURE — 25010000002 IOPAMIDOL 61 % SOLUTION: Performed by: EMERGENCY MEDICINE

## 2018-07-21 PROCEDURE — 96374 THER/PROPH/DIAG INJ IV PUSH: CPT

## 2018-07-21 PROCEDURE — 99283 EMERGENCY DEPT VISIT LOW MDM: CPT

## 2018-07-21 RX ORDER — DICYCLOMINE HCL 20 MG
20 TABLET ORAL EVERY 6 HOURS
Qty: 15 TABLET | Refills: 0 | Status: SHIPPED | OUTPATIENT
Start: 2018-07-21 | End: 2022-07-27 | Stop reason: HOSPADM

## 2018-07-21 RX ORDER — ROSUVASTATIN CALCIUM 20 MG/1
40 TABLET, COATED ORAL DAILY
COMMUNITY
End: 2019-10-20

## 2018-07-21 RX ORDER — KETOROLAC TROMETHAMINE 15 MG/ML
15 INJECTION, SOLUTION INTRAMUSCULAR; INTRAVENOUS ONCE
Status: COMPLETED | OUTPATIENT
Start: 2018-07-21 | End: 2018-07-21

## 2018-07-21 RX ADMIN — SODIUM CHLORIDE 1000 ML: 9 INJECTION, SOLUTION INTRAVENOUS at 08:52

## 2018-07-21 RX ADMIN — IOPAMIDOL 85 ML: 612 INJECTION, SOLUTION INTRAVENOUS at 09:54

## 2018-07-21 RX ADMIN — KETOROLAC TROMETHAMINE 15 MG: 15 INJECTION, SOLUTION INTRAMUSCULAR; INTRAVENOUS at 12:28

## 2018-07-23 ENCOUNTER — TELEPHONE (OUTPATIENT)
Dept: SOCIAL WORK | Facility: HOSPITAL | Age: 63
End: 2018-07-23

## 2018-08-09 RX ORDER — ATORVASTATIN CALCIUM 40 MG/1
TABLET, FILM COATED ORAL
Qty: 90 TABLET | Refills: 1 | Status: SHIPPED | OUTPATIENT
Start: 2018-08-09

## 2018-09-07 RX ORDER — CLONIDINE HYDROCHLORIDE 0.1 MG/1
TABLET ORAL
Qty: 60 TABLET | Refills: 0 | Status: SHIPPED | OUTPATIENT
Start: 2018-09-07 | End: 2019-07-17 | Stop reason: SDUPTHER

## 2018-10-25 RX ORDER — AMLODIPINE BESYLATE AND BENAZEPRIL HYDROCHLORIDE 10; 20 MG/1; MG/1
CAPSULE ORAL
Qty: 30 CAPSULE | Refills: 4 | OUTPATIENT
Start: 2018-10-25

## 2018-10-29 RX ORDER — AMLODIPINE BESYLATE AND BENAZEPRIL HYDROCHLORIDE 10; 20 MG/1; MG/1
CAPSULE ORAL
Qty: 30 CAPSULE | Refills: 4 | OUTPATIENT
Start: 2018-10-29

## 2019-07-17 RX ORDER — CLONIDINE HYDROCHLORIDE 0.1 MG/1
TABLET ORAL
Qty: 60 TABLET | Refills: 0 | Status: SHIPPED | OUTPATIENT
Start: 2019-07-17 | End: 2019-08-16 | Stop reason: SDUPTHER

## 2019-08-16 RX ORDER — CLONIDINE HYDROCHLORIDE 0.1 MG/1
TABLET ORAL
Qty: 60 TABLET | Refills: 0 | Status: SHIPPED | OUTPATIENT
Start: 2019-08-16 | End: 2022-03-07 | Stop reason: HOSPADM

## 2019-09-18 RX ORDER — CLONIDINE HYDROCHLORIDE 0.1 MG/1
TABLET ORAL
Qty: 60 TABLET | Refills: 0 | OUTPATIENT
Start: 2019-09-18

## 2019-09-20 RX ORDER — CLONIDINE HYDROCHLORIDE 0.1 MG/1
TABLET ORAL
Qty: 60 TABLET | Refills: 0 | OUTPATIENT
Start: 2019-09-20

## 2019-10-20 ENCOUNTER — HOSPITAL ENCOUNTER (EMERGENCY)
Facility: HOSPITAL | Age: 64
Discharge: HOME | End: 2019-10-20
Attending: EMERGENCY MEDICINE | Admitting: EMERGENCY MEDICINE

## 2019-10-20 ENCOUNTER — APPOINTMENT (OUTPATIENT)
Dept: GENERAL RADIOLOGY | Facility: HOSPITAL | Age: 64
End: 2019-10-20

## 2019-10-20 VITALS
DIASTOLIC BLOOD PRESSURE: 86 MMHG | RESPIRATION RATE: 18 BRPM | WEIGHT: 195.3 LBS | BODY MASS INDEX: 29.6 KG/M2 | TEMPERATURE: 98.8 F | HEIGHT: 68 IN | HEART RATE: 78 BPM | SYSTOLIC BLOOD PRESSURE: 140 MMHG | OXYGEN SATURATION: 95 %

## 2019-10-20 DIAGNOSIS — R53.81 MALAISE: Primary | ICD-10-CM

## 2019-10-20 LAB
ALBUMIN SERPL-MCNC: 4.3 G/DL (ref 3.5–5.2)
ALBUMIN/GLOB SERPL: 1.5 G/DL
ALP SERPL-CCNC: 65 U/L (ref 39–117)
ALT SERPL W P-5'-P-CCNC: 33 U/L (ref 1–41)
ANION GAP SERPL CALCULATED.3IONS-SCNC: 13.8 MMOL/L (ref 5–15)
AST SERPL-CCNC: 19 U/L (ref 1–40)
BASOPHILS # BLD AUTO: 0.07 10*3/MM3 (ref 0–0.2)
BASOPHILS NFR BLD AUTO: 0.8 % (ref 0–1.5)
BILIRUB SERPL-MCNC: 0.6 MG/DL (ref 0.2–1.2)
BUN BLD-MCNC: 13 MG/DL (ref 8–23)
BUN/CREAT SERPL: 14.3 (ref 7–25)
CALCIUM SPEC-SCNC: 9.5 MG/DL (ref 8.6–10.5)
CHLORIDE SERPL-SCNC: 104 MMOL/L (ref 98–107)
CO2 SERPL-SCNC: 25.2 MMOL/L (ref 22–29)
CREAT BLD-MCNC: 0.91 MG/DL (ref 0.76–1.27)
DEPRECATED RDW RBC AUTO: 39.6 FL (ref 37–54)
EOSINOPHIL # BLD AUTO: 0.18 10*3/MM3 (ref 0–0.4)
EOSINOPHIL NFR BLD AUTO: 2.1 % (ref 0.3–6.2)
ERYTHROCYTE [DISTWIDTH] IN BLOOD BY AUTOMATED COUNT: 11.9 % (ref 12.3–15.4)
GFR SERPL CREATININE-BSD FRML MDRD: 84 ML/MIN/1.73
GLOBULIN UR ELPH-MCNC: 2.9 GM/DL
GLUCOSE BLD-MCNC: 142 MG/DL (ref 65–99)
HCT VFR BLD AUTO: 43.4 % (ref 37.5–51)
HGB BLD-MCNC: 15.5 G/DL (ref 13–17.7)
HOLD SPECIMEN: NORMAL
HOLD SPECIMEN: NORMAL
IMM GRANULOCYTES # BLD AUTO: 0.08 10*3/MM3 (ref 0–0.05)
IMM GRANULOCYTES NFR BLD AUTO: 0.9 % (ref 0–0.5)
LYMPHOCYTES # BLD AUTO: 1.21 10*3/MM3 (ref 0.7–3.1)
LYMPHOCYTES NFR BLD AUTO: 14.2 % (ref 19.6–45.3)
MCH RBC QN AUTO: 32.6 PG (ref 26.6–33)
MCHC RBC AUTO-ENTMCNC: 35.7 G/DL (ref 31.5–35.7)
MCV RBC AUTO: 91.4 FL (ref 79–97)
MONOCYTES # BLD AUTO: 0.99 10*3/MM3 (ref 0.1–0.9)
MONOCYTES NFR BLD AUTO: 11.6 % (ref 5–12)
NEUTROPHILS # BLD AUTO: 5.98 10*3/MM3 (ref 1.7–7)
NEUTROPHILS NFR BLD AUTO: 70.4 % (ref 42.7–76)
NRBC BLD AUTO-RTO: 0 /100 WBC (ref 0–0.2)
PLATELET # BLD AUTO: 195 10*3/MM3 (ref 140–450)
PMV BLD AUTO: 10 FL (ref 6–12)
POTASSIUM BLD-SCNC: 4 MMOL/L (ref 3.5–5.2)
PROT SERPL-MCNC: 7.2 G/DL (ref 6–8.5)
RBC # BLD AUTO: 4.75 10*6/MM3 (ref 4.14–5.8)
SODIUM BLD-SCNC: 143 MMOL/L (ref 136–145)
TROPONIN T SERPL-MCNC: <0.01 NG/ML (ref 0–0.03)
TSH SERPL DL<=0.05 MIU/L-ACNC: 2.77 UIU/ML (ref 0.27–4.2)
WBC NRBC COR # BLD: 8.51 10*3/MM3 (ref 3.4–10.8)
WHOLE BLOOD HOLD SPECIMEN: NORMAL
WHOLE BLOOD HOLD SPECIMEN: NORMAL

## 2019-10-20 PROCEDURE — 93005 ELECTROCARDIOGRAM TRACING: CPT | Performed by: PHYSICIAN ASSISTANT

## 2019-10-20 PROCEDURE — 99283 EMERGENCY DEPT VISIT LOW MDM: CPT

## 2019-10-20 PROCEDURE — 71046 X-RAY EXAM CHEST 2 VIEWS: CPT

## 2019-10-20 PROCEDURE — 85025 COMPLETE CBC W/AUTO DIFF WBC: CPT | Performed by: PHYSICIAN ASSISTANT

## 2019-10-20 PROCEDURE — 80053 COMPREHEN METABOLIC PANEL: CPT | Performed by: PHYSICIAN ASSISTANT

## 2019-10-20 PROCEDURE — 84484 ASSAY OF TROPONIN QUANT: CPT | Performed by: PHYSICIAN ASSISTANT

## 2019-10-20 PROCEDURE — 93010 ELECTROCARDIOGRAM REPORT: CPT | Performed by: INTERNAL MEDICINE

## 2019-10-20 PROCEDURE — 84443 ASSAY THYROID STIM HORMONE: CPT | Performed by: PHYSICIAN ASSISTANT

## 2019-10-20 NOTE — ED PROVIDER NOTES
MD ATTESTATION NOTE    The CULLEN and I have discussed this patient's history, physical exam, and treatment plan.  I have reviewed the documentation and personally had a face to face interaction with the patient. I affirm the documentation and agree with the treatment and plan.  The attached note describes my personal findings.      Shankar Daley is a 64 y.o. male who presents to the ED c/o generalized fatigue since Thursday.  Reports sore throat and mild cough.  He denies having any chest pain or shortness of breath.  He specifically denies having any pleuritic chest pain.  Patient states that he has been working excessively for [employer] and he recently.  He thinks he may just be worn down from his heavy work schedule.  No fevers.  No urinary symptoms.  No vomiting or diarrhea.      On exam:  He is systemically well-appearing  Clear to auscultation bilaterally  Regular rate and rhythm  Abdomen is soft and nontender  No deformity  Cranial nerves II through XII are grossly normal, no focal deficit, moves all extremities      Medical Decision Making:  ED Course as of Oct 20 1951   Sun Oct 20, 2019   1944 I rechecked the patient.  He is asymptomatic for UTI.  He has a urine sample at the bedside, but prefers not to wait for urinalysis.  I think this is okay in light of his normal white blood cell count, no fever, and no urinary symptoms.  He is been working 12-hour shifts 7 days a week for the last 1 month and I suspect this has something to do with his fatigue.  Additionally has some mild upper respiratory symptoms.  Workup appears unremarkable.  He plans to follow-up with Dr. Erickson this week for medication recheck anyway, will get reevaluated for the symptoms then as well.  Indications for return to the ER discussed needs discharged in stable condition.  [KA]   1950 EKG interpreted by myself.  Time 1818.  Sinus rhythm.  Heart rate 69.  Borderline right axis deviation.  No acute ST changes.  [TD]      ED Course User  Index  [KA] Safia Masters PA  [TD] Med Noriega II, MD Davis, Thomas Edward II, MD  10/20/19 1952

## 2019-10-20 NOTE — ED PROVIDER NOTES
"EMERGENCY DEPARTMENT ENCOUNTER    Room Number:  24/24  Date seen:  10/20/2019  Time seen: 5:55 PM  PCP: Brenda Erickson MD    HPI:  Chief complaint: fatigue and arthralgias   Context:Shankar Daley is a 64 y.o. male who presents to the ED with c/o fatigue and malaise with myalgias that began Thursday.  Pt confirms mild sore throat and mild nonproductive cough. Pt denies fever, chills, SOA, CP, N/V/D, abd pain, lightheadedness, dizziness, HA, dark/bloody stool, or any urinary sx. Pt states he received his flu-shot yesterday. Per pt's wife, he works at [employer] and has been working 12 hour shifts 7 days a week for about one month now.  Pt reports he went to [facility] earlier today and was advised to come to the ER for further evaluation. No cardiac hx. There are no other complaints at this time.     Onset: gradual  Duration: 3 days  Timing: constant   Character: \"fatigue and arthralgias\"   Aggravating Factors: none mentioned   Alleviating Factors: none mentioned   Severity: moderate      ALLERGIES  Hydrochlorothiazide    PAST MEDICAL HISTORY  Active Ambulatory Problems     Diagnosis Date Noted   • Airway hyperreactivity 02/29/2016   • Bronchitis 02/29/2016   • Cervical radiculitis 02/29/2016   • Cervical pain 02/29/2016   • CD (contact dermatitis) 02/29/2016   • DDD (degenerative disc disease), cervical 02/29/2016   • Diabetes (CMS/[facility]) 02/29/2016   • Non-insulin dependent type 2 diabetes mellitus (CMS/[facility]) 02/29/2016   • DD (diverticular disease) 02/29/2016   • BP (high blood pressure) 02/29/2016   • HLD (hyperlipidemia) 02/29/2016   • Adverse effect of motion 02/29/2016   • Allergic dermatitis due to poison ivy 02/29/2016   • Borderline diabetes 02/29/2016   • Eczema 02/29/2016   • Encounter for screening for malignant neoplasm of colon 02/29/2016   • Diverticulitis of large intestine 04/20/2016   • Health care maintenance 08/29/2016   • Hydrochlorothiazide adverse reaction 11/17/2017     Resolved Ambulatory Problems     " Diagnosis Date Noted   • No Resolved Ambulatory Problems     Past Medical History:   Diagnosis Date   • Asthma    • Diabetes mellitus (CMS/HCC)    • Diverticulitis    • Hyperlipidemia    • Hypertension        PAST SURGICAL HISTORY  Past Surgical History:   Procedure Laterality Date   • APPENDECTOMY     • COLONOSCOPY N/A 12/23/2016    Procedure: COLONOSCOPY INTO CECUM/ TERMINAL ILEUM;  Surgeon: Shad Zambrano MD;  Location: [facility] ENDOSCOPY;  Service:        FAMILY HISTORY  Family History   Problem Relation Age of Onset   • Hypertension Father    • Alzheimer's disease Mother    • Hypertension Brother    • Diabetes Brother    • Hypertension Brother        SOCIAL HISTORY  Social History     Socioeconomic History   • Marital status:      Spouse name: Not on file   • Number of children: Not on file   • Years of education: Not on file   • Highest education level: Not on file   Tobacco Use   • Smoking status: Never Smoker   • Smokeless tobacco: Never Used   Substance and Sexual Activity   • Alcohol use: Yes   • Drug use: No   • Sexual activity: Yes     Partners: Female       REVIEW OF SYSTEMS  Review of Systems   Constitutional: Positive for fatigue. Negative for chills and fever.   HENT: Positive for sore throat.    Eyes: Negative.    Respiratory: Positive for cough (mild). Negative for shortness of breath.    Cardiovascular: Negative for chest pain.   Gastrointestinal: Negative for abdominal pain, blood in stool, diarrhea, nausea and vomiting.   Genitourinary: Negative.  Negative for difficulty urinating, dysuria and hematuria.   Musculoskeletal: Positive for myalgias.   Skin: Negative.    Neurological: Negative.  Negative for dizziness, light-headedness and headaches.   Psychiatric/Behavioral: Negative.        PHYSICAL EXAM  ED Triage Vitals   Temp Heart Rate Resp BP SpO2   10/20/19 1607 10/20/19 1607 10/20/19 1607 10/20/19 1708 10/20/19 1607   98.8 °F (37.1 °C) 103 16 144/82 97 %      Temp src Heart Rate  Source Patient Position BP Location FiO2 (%)   10/20/19 1607 10/20/19 1607 10/20/19 1708 10/20/19 1708 --   Tympanic Monitor Lying Right arm      Physical Exam   Constitutional: He is oriented to person, place, and time and well-developed, well-nourished, and in no distress.   Well-appearing   HENT:   Head: Normocephalic and atraumatic.   Right Ear: Tympanic membrane and external ear normal.   Left Ear: Tympanic membrane and external ear normal.   Nose: Nose normal.   Mouth/Throat: Oropharynx is clear and moist and mucous membranes are normal. No oropharyngeal exudate, posterior oropharyngeal edema or posterior oropharyngeal erythema.   Eyes: Conjunctivae and EOM are normal. Pupils are equal, round, and reactive to light.   Neck: Normal range of motion. Neck supple.   Cardiovascular: Normal rate and regular rhythm.   Pulmonary/Chest: Effort normal and breath sounds normal. He has no wheezes. He has no rhonchi. He has no rales.   Abdominal: Soft. He exhibits no distension. There is no tenderness.   Musculoskeletal: Normal range of motion. He exhibits no edema.   No BLE edema.   Neurological: He is alert and oriented to person, place, and time. GCS score is 15.   Skin: Skin is warm and dry.   Psychiatric: Affect normal.   Nursing note and vitals reviewed.      LAB RESULTS  Recent Results (from the past 24 hour(s))   POCT Influenza A/B    Collection Time: 10/20/19  3:33 PM   Result Value Ref Range    Rapid Influenza A Ag Negative Negative    Rapid Influenza B Ag Negative Negative    Internal Control Passed Passed    Lot Number [id number]     Expiration Date 53,121    Light Blue Top    Collection Time: 10/20/19  5:17 PM   Result Value Ref Range    Extra Tube hold for add-on    Green Top (Gel)    Collection Time: 10/20/19  5:17 PM   Result Value Ref Range    Extra Tube Hold for add-ons.    Lavender Top    Collection Time: 10/20/19  5:17 PM   Result Value Ref Range    Extra Tube hold for add-on    Gold Top - SST     Collection Time: 10/20/19  5:17 PM   Result Value Ref Range    Extra Tube Hold for add-ons.    Comprehensive Metabolic Panel    Collection Time: 10/20/19  5:17 PM   Result Value Ref Range    Glucose 142 (H) 65 - 99 mg/dL    BUN 13 8 - 23 mg/dL    Creatinine 0.91 0.76 - 1.27 mg/dL    Sodium 143 136 - 145 mmol/L    Potassium 4.0 3.5 - 5.2 mmol/L    Chloride 104 98 - 107 mmol/L    CO2 25.2 22.0 - 29.0 mmol/L    Calcium 9.5 8.6 - 10.5 mg/dL    Total Protein 7.2 6.0 - 8.5 g/dL    Albumin 4.30 3.50 - 5.20 g/dL    ALT (SGPT) 33 1 - 41 U/L    AST (SGOT) 19 1 - 40 U/L    Alkaline Phosphatase 65 39 - 117 U/L    Total Bilirubin 0.6 0.2 - 1.2 mg/dL    eGFR Non African Amer 84 >60 mL/min/1.73    Globulin 2.9 gm/dL    A/G Ratio 1.5 g/dL    BUN/Creatinine Ratio 14.3 7.0 - 25.0    Anion Gap 13.8 5.0 - 15.0 mmol/L   Troponin    Collection Time: 10/20/19  5:17 PM   Result Value Ref Range    Troponin T <0.010 0.000 - 0.030 ng/mL   TSH    Collection Time: 10/20/19  5:17 PM   Result Value Ref Range    TSH 2.770 0.270 - 4.200 uIU/mL   CBC Auto Differential    Collection Time: 10/20/19  5:17 PM   Result Value Ref Range    WBC 8.51 3.40 - 10.80 10*3/mm3    RBC 4.75 4.14 - 5.80 10*6/mm3    Hemoglobin 15.5 13.0 - 17.7 g/dL    Hematocrit 43.4 37.5 - 51.0 %    MCV 91.4 79.0 - 97.0 fL    MCH 32.6 26.6 - 33.0 pg    MCHC 35.7 31.5 - 35.7 g/dL    RDW 11.9 (L) 12.3 - 15.4 %    RDW-SD 39.6 37.0 - 54.0 fl    MPV 10.0 6.0 - 12.0 fL    Platelets 195 140 - 450 10*3/mm3    Neutrophil % 70.4 42.7 - 76.0 %    Lymphocyte % 14.2 (L) 19.6 - 45.3 %    Monocyte % 11.6 5.0 - 12.0 %    Eosinophil % 2.1 0.3 - 6.2 %    Basophil % 0.8 0.0 - 1.5 %    Immature Grans % 0.9 (H) 0.0 - 0.5 %    Neutrophils, Absolute 5.98 1.70 - 7.00 10*3/mm3    Lymphocytes, Absolute 1.21 0.70 - 3.10 10*3/mm3    Monocytes, Absolute 0.99 (H) 0.10 - 0.90 10*3/mm3    Eosinophils, Absolute 0.18 0.00 - 0.40 10*3/mm3    Basophils, Absolute 0.07 0.00 - 0.20 10*3/mm3    Immature Grans, Absolute  "0.08 (H) 0.00 - 0.05 10*3/mm3    nRBC 0.0 0.0 - 0.2 /100 WBC       I ordered the above labs and reviewed the results    RADIOLOGY  XR Chest 2 View    (Results Pending)       I ordered the above noted radiological studies and reviewed the images on the PACS system.      MEDICATIONS GIVEN IN ER  Medications - No data to display    EKG  Interpreted by ED Physician     PROCEDURES  Procedures      PROGRESS AND CONSULTS    Progress Notes:    ED Course as of Oct 21 0716   Sun Oct 20, 2019   1944 I rechecked the patient.  He is asymptomatic for UTI.  He has a urine sample at the bedside, but prefers not to wait for urinalysis.  I think this is okay in light of his normal white blood cell count, no fever, and no urinary symptoms.  He is been working 12-hour shifts 7 days a week for the last 1 month and I suspect this has something to do with his fatigue.  Additionally has some mild upper respiratory symptoms.  Workup appears unremarkable.  He plans to follow-up with Dr. Erickson this week for medication recheck anyway, will get reevaluated for the symptoms then as well.  Indications for return to the ER discussed needs discharged in stable condition.  [KA]   1950 EKG interpreted by myself.  Time 1818.  Sinus rhythm.  Heart rate 69.  Borderline right axis deviation.  No acute ST changes.  [TD]      ED Course User Index  [KA] Safia Masters PA  [TD] Med Noriega II, MD       1804  Ordered UA, TSH, CMP, troponin, and CXR for further evaluation.     1930 Reviewed pt's history and workup with Dr. Noriega.  After a bedside evaluation; Dr Noriega agrees with the plan of care          Disposition vitals:  /82 (BP Location: Right arm, Patient Position: Lying)   Pulse 103   Temp 98.8 °F (37.1 °C) (Tympanic)   Resp 16   Ht 172.7 cm (68\")   Wt 88.6 kg (195 lb 4.8 oz)   SpO2 97%   BMI 29.70 kg/m²       DIAGNOSIS  Final diagnoses:   Malaise       FOLLOW UP   Brenda Erickson MD  [address] " [id number]  [phone]    In 2 days        RX     Medication List      No changes were made to your prescriptions during this visit.           Documentation assistance provided by eli Leavitt for Safia Masters PA-C.  Information recorded by the scribkarina was done at my direction and has been verified and validated by me.               Stefany Leavitt  10/20/19 1822       Safia Masters PA  10/21/19 8833

## 2021-03-19 ENCOUNTER — BULK ORDERING (OUTPATIENT)
Dept: CASE MANAGEMENT | Facility: OTHER | Age: 66
End: 2021-03-19

## 2021-03-19 DIAGNOSIS — Z23 IMMUNIZATION DUE: ICD-10-CM

## 2021-05-04 ENCOUNTER — TRANSCRIBE ORDERS (OUTPATIENT)
Dept: ADMINISTRATIVE | Facility: HOSPITAL | Age: 66
End: 2021-05-04

## 2021-05-18 ENCOUNTER — TRANSCRIBE ORDERS (OUTPATIENT)
Dept: ADMINISTRATIVE | Facility: HOSPITAL | Age: 66
End: 2021-05-18

## 2021-05-18 DIAGNOSIS — R94.31 ABNORMAL ECG: Primary | ICD-10-CM

## 2021-05-27 ENCOUNTER — HOSPITAL ENCOUNTER (OUTPATIENT)
Dept: CARDIOLOGY | Facility: HOSPITAL | Age: 66
Discharge: HOME | End: 2021-05-27
Admitting: INTERNAL MEDICINE

## 2021-05-27 DIAGNOSIS — R94.31 ABNORMAL ECG: ICD-10-CM

## 2021-05-27 LAB
BH CV STRESS BP STAGE 1: NORMAL
BH CV STRESS BP STAGE 2: NORMAL
BH CV STRESS DURATION MIN STAGE 1: 3
BH CV STRESS DURATION MIN STAGE 2: 1
BH CV STRESS DURATION SEC STAGE 1: 0
BH CV STRESS DURATION SEC STAGE 2: 33
BH CV STRESS GRADE STAGE 1: 10
BH CV STRESS GRADE STAGE 2: 12
BH CV STRESS HR STAGE 1: 118
BH CV STRESS HR STAGE 2: 132
BH CV STRESS METS STAGE 1: 5
BH CV STRESS METS STAGE 2: 7.5
BH CV STRESS PROTOCOL 1: NORMAL
BH CV STRESS RECOVERY BP: NORMAL MMHG
BH CV STRESS RECOVERY HR: 98 BPM
BH CV STRESS SPEED STAGE 1: 1.7
BH CV STRESS SPEED STAGE 2: 2.5
BH CV STRESS STAGE 1: 1
BH CV STRESS STAGE 2: 2
MAXIMAL PREDICTED HEART RATE: 153 BPM
PERCENT MAX PREDICTED HR: 86.93 %
QT INTERVAL: 381 MS
STRESS BASELINE BP: NORMAL MMHG
STRESS BASELINE HR: 69 BPM
STRESS PERCENT HR: 102 %
STRESS POST ESTIMATED WORKLOAD: 6.5 METS
STRESS POST EXERCISE DUR MIN: 6 MIN
STRESS POST EXERCISE DUR SEC: 33 SEC
STRESS POST PEAK BP: NORMAL MMHG
STRESS POST PEAK HR: 133 BPM
STRESS TARGET HR: 130 BPM

## 2021-05-27 PROCEDURE — 93005 ELECTROCARDIOGRAM TRACING: CPT | Performed by: INTERNAL MEDICINE

## 2021-05-27 PROCEDURE — 93010 ELECTROCARDIOGRAM REPORT: CPT | Performed by: INTERNAL MEDICINE

## 2021-05-27 PROCEDURE — 93017 CV STRESS TEST TRACING ONLY: CPT

## 2021-05-27 PROCEDURE — 93018 CV STRESS TEST I&R ONLY: CPT | Performed by: INTERNAL MEDICINE

## 2021-05-27 RX ORDER — EZETIMIBE 10 MG/1
10 TABLET ORAL DAILY
COMMUNITY

## 2021-11-30 ENCOUNTER — OFFICE VISIT (OUTPATIENT)
Dept: GASTROENTEROLOGY | Facility: CLINIC | Age: 66
End: 2021-11-30

## 2021-11-30 DIAGNOSIS — R19.7 DIARRHEA, UNSPECIFIED TYPE: ICD-10-CM

## 2021-11-30 DIAGNOSIS — R10.32 LEFT LOWER QUADRANT ABDOMINAL PAIN: Primary | ICD-10-CM

## 2021-11-30 PROCEDURE — 99204 OFFICE O/P NEW MOD 45 MIN: CPT | Performed by: INTERNAL MEDICINE

## 2021-11-30 RX ORDER — BUDESONIDE AND FORMOTEROL FUMARATE DIHYDRATE 160; 4.5 UG/1; UG/1
2 AEROSOL RESPIRATORY (INHALATION) AS NEEDED
COMMUNITY

## 2021-11-30 NOTE — PROGRESS NOTES
Chief Complaint   Patient presents with   • Abdominal Pain   • Diarrhea     Shankar Daley is a 66 y.o. male who presents with a history of diarrhea with left lower quadrant pain  HPI     Patient 66-year-old male with history of acute diverticulitis diagnosed 2 and half weeks ago with hypertension hyperlipidemia as well as diabetes.  Patient experiencing diarrhea for the last 8 weeks or so went to primary care 20th weeks ago with the left lower quadrant pain and history of diverticulosis had labs done and began antibiotics for diverticulitis.  No CT was performed, patient denied any fever and was told that his labs were normal.  Patient reports feels he may have gotten a little better on the antibiotics but now is getting worse again.  Patient still denies fever chills no bright red blood per rectum or melena but is still having the diarrhea he started with.  The antibiotics did not affect either worsen or better his diarrhea.    Past Medical History:   Diagnosis Date   • Asthma    • Diabetes mellitus (HCC)    • Diverticulitis    • Hyperlipidemia    • Hypertension        Current Outpatient Medications:   •  albuterol (PROAIR HFA) 108 (90 Base) MCG/ACT inhaler, Inhale 2 puffs Every 6 (Six) Hours As Needed for Wheezing., Disp: 5 inhaler, Rfl: 3  •  amLODIPine-valsartan (EXFORGE)  MG per tablet, Take 320 tablets by mouth Daily., Disp: , Rfl:   •  atorvastatin (LIPITOR) 40 MG tablet, TAKE 1 TABLET DAILY, Disp: 90 tablet, Rfl: 1  •  budesonide-formoterol (SYMBICORT) 160-4.5 MCG/ACT inhaler, Inhale 2 puffs As Needed., Disp: , Rfl:   •  CloNIDine (CATAPRES) 0.1 MG tablet, TAKE ONE TABLET BY MOUTH TWICE A DAY (Patient taking differently: 0.3 mg.), Disp: 60 tablet, Rfl: 0  •  ezetimibe (ZETIA) 10 MG tablet, Take 10 mg by mouth Daily., Disp: , Rfl:   •  metFORMIN (GLUCOPHAGE) 500 MG tablet, TAKE 1 TABLET DAILY WITH BREAKFAST (Patient taking differently: Take 500 mg by mouth Daily. Taking 1000 mg in the morning and 500 mg  in the evening), Disp: 90 tablet, Rfl: 1  •  Multiple Vitamins-Minerals (MULTI FOR HIM 50+ PO), Take  by mouth., Disp: , Rfl:   •  amLODIPine-benazepril (LOTREL) 10-20 MG per capsule, TAKE 1 CAPSULE DAILY, Disp: 90 capsule, Rfl: 0  •  cloNIDine (CATAPRES) 0.3 MG tablet, Take 0.3 mg by mouth Daily., Disp: , Rfl:   •  diazePAM (VALIUM) 5 MG tablet, Take 5 mg by mouth Daily., Disp: , Rfl:   •  dicyclomine (BENTYL) 20 MG tablet, Take 1 tablet by mouth Every 6 (Six) Hours., Disp: 15 tablet, Rfl: 0  •  fluticasone-salmeterol (ADVAIR) 250-50 MCG/DOSE DISKUS, Inhale 1 puff 2 (Two) Times a Day. Use 1 inhalation BID, Disp: 60 each, Rfl: 4  •  methylPREDNISolone (MEDROL) 4 MG dose pack, Take 4 mg by mouth Daily., Disp: , Rfl:   Allergies   Allergen Reactions   • Hydrochlorothiazide Rash     Social History     Socioeconomic History   • Marital status:    Tobacco Use   • Smoking status: Never Smoker   • Smokeless tobacco: Never Used   Vaping Use   • Vaping Use: Never used   Substance and Sexual Activity   • Alcohol use: Yes     Comment: 2-3 drinks daily    • Drug use: No   • Sexual activity: Yes     Partners: Female     Family History   Problem Relation Age of Onset   • Hypertension Father    • Alzheimer's disease Mother    • Hypertension Brother    • Diabetes Brother    • Hypertension Brother      Review of Systems   Constitutional: Negative.    HENT: Negative.    Eyes: Negative.    Respiratory: Negative.    Cardiovascular: Negative.    Gastrointestinal: Positive for abdominal pain and diarrhea. Negative for abdominal distention, anal bleeding, blood in stool, constipation, nausea, rectal pain and vomiting.   Endocrine: Negative.    Musculoskeletal: Negative.    Skin: Negative.    Allergic/Immunologic: Negative.    Hematological: Negative.      There were no vitals filed for this visit.  Physical Exam  Vitals and nursing note reviewed.   Constitutional:       Appearance: Normal appearance. He is well-developed.   HENT:       Head: Normocephalic and atraumatic.   Eyes:      General: No scleral icterus.     Conjunctiva/sclera: Conjunctivae normal.   Neck:      Trachea: No tracheal deviation.   Cardiovascular:      Rate and Rhythm: Normal rate and regular rhythm.      Heart sounds: No murmur heard.  No friction rub. No gallop.    Pulmonary:      Effort: Pulmonary effort is normal. No respiratory distress.      Breath sounds: Normal breath sounds. No wheezing or rales.   Abdominal:      General: Bowel sounds are normal. There is no distension.      Palpations: Abdomen is soft. There is no mass.      Tenderness: There is abdominal tenderness. There is no guarding or rebound.   Musculoskeletal:         General: No swelling or tenderness. Normal range of motion.      Cervical back: Normal range of motion.   Skin:     General: Skin is warm and dry.      Coloration: Skin is not jaundiced.      Findings: No rash.   Neurological:      General: No focal deficit present.      Mental Status: He is alert and oriented to person, place, and time.      Cranial Nerves: No cranial nerve deficit.   Psychiatric:         Behavior: Behavior normal.         Thought Content: Thought content normal.         Judgment: Judgment normal.       Diagnoses and all orders for this visit:    Left lower quadrant abdominal pain  -     CBC & Differential  -     Comprehensive Metabolic Panel  -     CT Abdomen Pelvis With Contrast; Future  -     Clostridium Difficile Toxin, PCR - Stool, Per Rectum  -     Stool Culture (Reference Lab) - Stool, Per Rectum  -     Fecal Leukocytes - Stool, Per Rectum    Diarrhea, unspecified type  -     CBC & Differential  -     Comprehensive Metabolic Panel  -     CT Abdomen Pelvis With Contrast; Future  -     Clostridium Difficile Toxin, PCR - Stool, Per Rectum  -     Stool Culture (Reference Lab) - Stool, Per Rectum  -     Fecal Leukocytes - Stool, Per Rectum    Other orders  -     budesonide-formoterol (SYMBICORT) 160-4.5 MCG/ACT inhaler;  Inhale 2 puffs As Needed.    Patient 66-year-old male with history diabetes, hyperlipidemia and hypertension as well as a history of diverticulitis last colonoscopy in 2016 with sigmoid diverticulosis.  Patient reports developed left lower quadrant pain and treated 2-1/2 weeks ago for acute diverticulitis.  Patient reports labs at that time were reportedly normal and no CT was performed.  Patient reports still having some pain and may be getting a little worse in the last few days here for further recommendations.  Patient denies any fever chills no bright red blood per rectum or melena but has been having diarrhea for approximately 8 weeks.  Patient reports the diarrhea persists without mucus and no blood per rectum has been noted.  Patient reports the antibiotics did not help and did not harm his diarrhea.  At this point would recommend repeating his labs including CBC with differential and CMP, will also send stool for evaluation.  We will also arrange CT scan of abdomen and pelvis to make sure no abdominal abscess is present.  If findings negative will arrange colonoscopy, last done over 5 years ago.  If signs of ongoing infection will repeat antibiotics pending stool studies.

## 2021-12-01 LAB
ALBUMIN SERPL-MCNC: 4.9 G/DL (ref 3.8–4.8)
ALBUMIN/GLOB SERPL: 2.5 {RATIO} (ref 1.2–2.2)
ALP SERPL-CCNC: 74 IU/L (ref 44–121)
ALT SERPL-CCNC: 26 IU/L (ref 0–44)
AST SERPL-CCNC: 21 IU/L (ref 0–40)
BASOPHILS # BLD AUTO: 0.1 X10E3/UL (ref 0–0.2)
BASOPHILS NFR BLD AUTO: 1 %
BILIRUB SERPL-MCNC: 0.7 MG/DL (ref 0–1.2)
BUN SERPL-MCNC: 17 MG/DL (ref 8–27)
BUN/CREAT SERPL: 19 (ref 10–24)
CALCIUM SERPL-MCNC: 10.5 MG/DL (ref 8.6–10.2)
CHLORIDE SERPL-SCNC: 105 MMOL/L (ref 96–106)
CO2 SERPL-SCNC: 22 MMOL/L (ref 20–29)
CREAT SERPL-MCNC: 0.89 MG/DL (ref 0.76–1.27)
EOSINOPHIL # BLD AUTO: 0.1 X10E3/UL (ref 0–0.4)
EOSINOPHIL NFR BLD AUTO: 1 %
ERYTHROCYTE [DISTWIDTH] IN BLOOD BY AUTOMATED COUNT: 11.8 % (ref 11.6–15.4)
GLOBULIN SER CALC-MCNC: 2 G/DL (ref 1.5–4.5)
GLUCOSE SERPL-MCNC: 149 MG/DL (ref 65–99)
HCT VFR BLD AUTO: 43.8 % (ref 37.5–51)
HGB BLD-MCNC: 14.9 G/DL (ref 13–17.7)
IMM GRANULOCYTES # BLD AUTO: 0.1 X10E3/UL (ref 0–0.1)
IMM GRANULOCYTES NFR BLD AUTO: 1 %
LYMPHOCYTES # BLD AUTO: 1.4 X10E3/UL (ref 0.7–3.1)
LYMPHOCYTES NFR BLD AUTO: 20 %
MCH RBC QN AUTO: 31.9 PG (ref 26.6–33)
MCHC RBC AUTO-ENTMCNC: 34 G/DL (ref 31.5–35.7)
MCV RBC AUTO: 94 FL (ref 79–97)
MONOCYTES # BLD AUTO: 0.7 X10E3/UL (ref 0.1–0.9)
MONOCYTES NFR BLD AUTO: 11 %
NEUTROPHILS # BLD AUTO: 4.5 X10E3/UL (ref 1.4–7)
NEUTROPHILS NFR BLD AUTO: 66 %
PLATELET # BLD AUTO: 254 X10E3/UL (ref 150–450)
POTASSIUM SERPL-SCNC: 5.1 MMOL/L (ref 3.5–5.2)
PROT SERPL-MCNC: 6.9 G/DL (ref 6–8.5)
RBC # BLD AUTO: 4.67 X10E6/UL (ref 4.14–5.8)
SODIUM SERPL-SCNC: 142 MMOL/L (ref 134–144)
WBC # BLD AUTO: 6.9 X10E3/UL (ref 3.4–10.8)

## 2021-12-02 ENCOUNTER — TELEPHONE (OUTPATIENT)
Dept: GASTROENTEROLOGY | Facility: CLINIC | Age: 66
End: 2021-12-02

## 2021-12-02 NOTE — TELEPHONE ENCOUNTER
----- Message from Shad Zambrano MD sent at 12/2/2021  9:14 AM EST -----  Labs are normal with no elevated white count.  Awaiting CT scan for further recommendations.

## 2021-12-09 ENCOUNTER — HOSPITAL ENCOUNTER (OUTPATIENT)
Dept: CT IMAGING | Facility: HOSPITAL | Age: 66
Discharge: HOME | End: 2021-12-09
Admitting: INTERNAL MEDICINE

## 2021-12-09 DIAGNOSIS — R10.32 LEFT LOWER QUADRANT ABDOMINAL PAIN: ICD-10-CM

## 2021-12-09 DIAGNOSIS — R19.7 DIARRHEA, UNSPECIFIED TYPE: ICD-10-CM

## 2021-12-09 PROCEDURE — 0 DIATRIZOATE MEGLUMINE & SODIUM PER 1 ML: Performed by: INTERNAL MEDICINE

## 2021-12-09 PROCEDURE — 74177 CT ABD & PELVIS W/CONTRAST: CPT

## 2021-12-09 PROCEDURE — 25010000002 IOPAMIDOL 61 % SOLUTION: Performed by: INTERNAL MEDICINE

## 2021-12-09 RX ADMIN — IOPAMIDOL 85 ML: 612 INJECTION, SOLUTION INTRAVENOUS at 15:57

## 2021-12-09 RX ADMIN — DIATRIZOATE MEGLUMINE AND DIATRIZOATE SODIUM 30 ML: 660; 100 LIQUID ORAL; RECTAL at 15:00

## 2021-12-10 ENCOUNTER — TELEPHONE (OUTPATIENT)
Dept: GASTROENTEROLOGY | Facility: CLINIC | Age: 66
End: 2021-12-10

## 2021-12-10 DIAGNOSIS — Z12.11 ENCOUNTER FOR SCREENING FOR MALIGNANT NEOPLASM OF COLON: Primary | ICD-10-CM

## 2021-12-10 NOTE — TELEPHONE ENCOUNTER
----- Message from Shad Zambrano MD sent at 12/10/2021  2:01 PM EST -----  CT negative for diverticulitis.  Please arrange colonoscopy.

## 2021-12-10 NOTE — TELEPHONE ENCOUNTER
Discharge medication review for this patient is complete. Pharmacist assisted with medication reconciliation of discharge medications with prior to admission medications.     The following changes were made to the discharge medication list based on pharmacist review:  Added:  None  Discontinued: None  Changed: None      Patient's Discharge Medication List  - medications as listed on After Visit Summary (AVS)     Review of your medicines      START taking       Dose / Directions    ibuprofen 600 MG tablet   Commonly known as:  ADVIL/MOTRIN        Dose:  600 mg   Take 1 tablet (600 mg) by mouth every 6 hours as needed for moderate pain or fever   Quantity:  120 tablet   Refills:  1       ondansetron 4 MG tablet   Commonly known as:  ZOFRAN   Used for:  Nausea        Dose:  4 mg   Take 1 tablet (4 mg) by mouth every 4 hours as needed for nausea or vomiting   Quantity:  40 tablet   Refills:  3       oxyCODONE IR 5 MG tablet   Commonly known as:  ROXICODONE        Dose:  5 mg   Take 1 tablet (5 mg) by mouth every 4 hours as needed for moderate to severe pain   Quantity:  40 tablet   Refills:  0       polyethylene glycol powder   Commonly known as:  MIRALAX        Dose:  1 capful   Take 17 g (1 capful) by mouth daily   Quantity:  510 g   Refills:  3            Where to get your medicines      These medications were sent to [facility] [address] [address]     Phone:  [phone]      ibuprofen 600 MG tablet     ondansetron 4 MG tablet     polyethylene glycol powder         Some of these will need a paper prescription and others can be bought over the counter. Ask your nurse if you have questions.     Bring a paper prescription for each of these medications      oxyCODONE IR 5 MG tablet              VM requesting call back.   12/13, left VM  Advising patient to check his my chart.   Hospital case request for c/s placed.

## 2021-12-13 ENCOUNTER — TELEPHONE (OUTPATIENT)
Dept: GASTROENTEROLOGY | Facility: CLINIC | Age: 66
End: 2021-12-13

## 2021-12-13 NOTE — TELEPHONE ENCOUNTER
Returned patient's phone call. Advised as per Dr. Zambrano's note. He verb understanding and is in agreement with the plan.

## 2021-12-13 NOTE — TELEPHONE ENCOUNTER
rashaun Brown for 02/16 arrive at 1030-cs/patient procedure packet was mailed out on 12/14 pt was advised time of arrival is subject to change, [facility] will call for w/finale time

## 2022-01-10 ENCOUNTER — TELEPHONE (OUTPATIENT)
Dept: GASTROENTEROLOGY | Facility: CLINIC | Age: 67
End: 2022-01-10

## 2022-01-10 NOTE — TELEPHONE ENCOUNTER
rashaun Willett for 03/07 arrive at 1200-cs/patient procedure packet was mailed out on 12/14 pt was advised time of arrival is subject to change, [facility] will call for w/finale time

## 2022-02-28 ENCOUNTER — TRANSCRIBE ORDERS (OUTPATIENT)
Dept: ADMINISTRATIVE | Facility: HOSPITAL | Age: 67
End: 2022-02-28

## 2022-02-28 DIAGNOSIS — Z01.818 OTHER SPECIFIED PRE-OPERATIVE EXAMINATION: Primary | ICD-10-CM

## 2022-03-03 ENCOUNTER — TELEPHONE (OUTPATIENT)
Dept: GASTROENTEROLOGY | Facility: CLINIC | Age: 67
End: 2022-03-03

## 2022-03-03 DIAGNOSIS — Z01.818 PRE-OP TESTING: Primary | ICD-10-CM

## 2022-03-05 ENCOUNTER — LAB (OUTPATIENT)
Dept: LAB | Facility: HOSPITAL | Age: 67
End: 2022-03-05

## 2022-03-05 DIAGNOSIS — Z01.818 OTHER SPECIFIED PRE-OPERATIVE EXAMINATION: ICD-10-CM

## 2022-03-05 LAB — SARS-COV-2 ORF1AB RESP QL NAA+PROBE: NOT DETECTED

## 2022-03-05 PROCEDURE — C9803 HOPD COVID-19 SPEC COLLECT: HCPCS

## 2022-03-05 PROCEDURE — U0004 COV-19 TEST NON-CDC HGH THRU: HCPCS

## 2022-03-07 ENCOUNTER — ANESTHESIA EVENT (OUTPATIENT)
Dept: GASTROENTEROLOGY | Facility: HOSPITAL | Age: 67
End: 2022-03-07

## 2022-03-07 ENCOUNTER — ANESTHESIA (OUTPATIENT)
Dept: GASTROENTEROLOGY | Facility: HOSPITAL | Age: 67
End: 2022-03-07

## 2022-03-07 ENCOUNTER — HOSPITAL ENCOUNTER (OUTPATIENT)
Facility: HOSPITAL | Age: 67
Setting detail: HOSPITAL OUTPATIENT SURGERY
Discharge: HOME | End: 2022-03-07
Attending: INTERNAL MEDICINE | Admitting: INTERNAL MEDICINE

## 2022-03-07 VITALS
HEIGHT: 70 IN | RESPIRATION RATE: 16 BRPM | OXYGEN SATURATION: 97 % | SYSTOLIC BLOOD PRESSURE: 130 MMHG | DIASTOLIC BLOOD PRESSURE: 90 MMHG | BODY MASS INDEX: 26.48 KG/M2 | HEART RATE: 89 BPM | WEIGHT: 185 LBS

## 2022-03-07 DIAGNOSIS — Z12.11 ENCOUNTER FOR SCREENING FOR MALIGNANT NEOPLASM OF COLON: ICD-10-CM

## 2022-03-07 LAB — GLUCOSE BLDC GLUCOMTR-MCNC: 198 MG/DL (ref 70–130)

## 2022-03-07 PROCEDURE — 82962 GLUCOSE BLOOD TEST: CPT

## 2022-03-07 PROCEDURE — 25010000002 PROPOFOL 10 MG/ML EMULSION: Performed by: ANESTHESIOLOGY

## 2022-03-07 PROCEDURE — 45380 COLONOSCOPY AND BIOPSY: CPT | Performed by: INTERNAL MEDICINE

## 2022-03-07 PROCEDURE — 88305 TISSUE EXAM BY PATHOLOGIST: CPT | Performed by: INTERNAL MEDICINE

## 2022-03-07 PROCEDURE — S0260 H&P FOR SURGERY: HCPCS | Performed by: INTERNAL MEDICINE

## 2022-03-07 RX ORDER — PROPOFOL 10 MG/ML
VIAL (ML) INTRAVENOUS AS NEEDED
Status: DISCONTINUED | OUTPATIENT
Start: 2022-03-07 | End: 2022-03-07 | Stop reason: SURG

## 2022-03-07 RX ORDER — LIDOCAINE HYDROCHLORIDE 20 MG/ML
INJECTION, SOLUTION INFILTRATION; PERINEURAL AS NEEDED
Status: DISCONTINUED | OUTPATIENT
Start: 2022-03-07 | End: 2022-03-07 | Stop reason: SURG

## 2022-03-07 RX ORDER — SODIUM CHLORIDE, SODIUM LACTATE, POTASSIUM CHLORIDE, CALCIUM CHLORIDE 600; 310; 30; 20 MG/100ML; MG/100ML; MG/100ML; MG/100ML
30 INJECTION, SOLUTION INTRAVENOUS CONTINUOUS PRN
Status: DISCONTINUED | OUTPATIENT
Start: 2022-03-07 | End: 2022-03-07 | Stop reason: HOSPADM

## 2022-03-07 RX ADMIN — LIDOCAINE HYDROCHLORIDE 100 MG: 20 INJECTION, SOLUTION INFILTRATION; PERINEURAL at 11:42

## 2022-03-07 RX ADMIN — PROPOFOL 350 MG: 10 INJECTION, EMULSION INTRAVENOUS at 11:44

## 2022-03-07 RX ADMIN — SODIUM CHLORIDE, POTASSIUM CHLORIDE, SODIUM LACTATE AND CALCIUM CHLORIDE 30 ML/HR: 600; 310; 30; 20 INJECTION, SOLUTION INTRAVENOUS at 11:16

## 2022-03-07 NOTE — H&P
[facility]  Pre Procedure History & Physical    Chief Complaint:   History diverticulitis    Subjective     HPI:   Patient 66-year-old male with hypertension, hyperlipidemia, diabetes who presented with clinical inspection of diverticulitis here for colonoscopy.    Past Medical History:   Past Medical History:   Diagnosis Date   • Asthma    • Diabetes mellitus (HCC)    • Diverticulitis    • Hyperlipidemia    • Hypertension        Past Surgical History:  Past Surgical History:   Procedure Laterality Date   • APPENDECTOMY     • COLONOSCOPY N/A 12/23/2016    Procedure: COLONOSCOPY INTO CECUM/ TERMINAL ILEUM;  Surgeon: Shad Zambrano MD;  Location: [facility] ENDOSCOPY;  Service:        Family History:  Family History   Problem Relation Age of Onset   • Hypertension Father    • Alzheimer's disease Mother    • Hypertension Brother    • Diabetes Brother    • Hypertension Brother        Social History:   reports that he has never smoked. He has never used smokeless tobacco. He reports current alcohol use. He reports that he does not use drugs.    Medications:   Medications Prior to Admission   Medication Sig Dispense Refill Last Dose   • albuterol (PROAIR HFA) 108 (90 Base) MCG/ACT inhaler Inhale 2 puffs Every 6 (Six) Hours As Needed for Wheezing. 5 inhaler 3 Past Month at Unknown time   • amLODIPine-benazepril (LOTREL) 10-20 MG per capsule TAKE 1 CAPSULE DAILY 90 capsule 0 3/6/2022 at Unknown time   • amLODIPine-valsartan (EXFORGE)  MG per tablet Take 320 tablets by mouth Daily.   3/6/2022 at Unknown time   • atorvastatin (LIPITOR) 40 MG tablet TAKE 1 TABLET DAILY 90 tablet 1 3/6/2022 at Unknown time   • budesonide-formoterol (SYMBICORT) 160-4.5 MCG/ACT inhaler Inhale 2 puffs As Needed.   Past Month at Unknown time   • CloNIDine (CATAPRES) 0.1 MG tablet TAKE ONE TABLET BY MOUTH TWICE A DAY (Patient taking differently: 0.3 mg.) 60 tablet 0 3/6/2022 at Unknown time   • ezetimibe (ZETIA) 10 MG  "tablet Take 10 mg by mouth Daily.   Past Week at Unknown time   • metFORMIN (GLUCOPHAGE) 500 MG tablet TAKE 1 TABLET DAILY WITH BREAKFAST (Patient taking differently: Take 500 mg by mouth Daily. Taking 1000 mg in the morning and 500 mg in the evening) 90 tablet 1 Past Week at Unknown time   • Multiple Vitamins-Minerals (MULTI FOR HIM 50+ PO) Take  by mouth.   Past Week at Unknown time   • cloNIDine (CATAPRES) 0.3 MG tablet Take 0.3 mg by mouth Daily.      • diazePAM (VALIUM) 5 MG tablet Take 5 mg by mouth Daily.   More than a month at Unknown time   • dicyclomine (BENTYL) 20 MG tablet Take 1 tablet by mouth Every 6 (Six) Hours. 15 tablet 0 More than a month at Unknown time   • fluticasone-salmeterol (ADVAIR) 250-50 MCG/DOSE DISKUS Inhale 1 puff 2 (Two) Times a Day. Use 1 inhalation BID 60 each 4 More than a month at Unknown time   • methylPREDNISolone (MEDROL) 4 MG dose pack Take 4 mg by mouth Daily.   More than a month at Unknown time       Allergies:  Hydrochlorothiazide    ROS:    Pertinent items are noted in HPI     Objective     Blood pressure 158/76, pulse 92, resp. rate 16, height 177.8 cm (70\"), weight 83.9 kg (185 lb), SpO2 97 %.    Physical Exam   Constitutional: Pt is oriented to person, place, and time and well-developed, well-nourished, and in no distress.   Mouth/Throat: Oropharynx is clear and moist.   Neck: Normal range of motion.   Cardiovascular: Normal rate, regular rhythm and normal heart sounds.    Pulmonary/Chest: Effort normal and breath sounds normal.   Abdominal: Soft. Nontender  Skin: Skin is warm and dry.   Psychiatric: Mood, memory, affect and judgment normal.     Assessment/Plan     Diagnosis:  Status post diverticulitis    Anticipated Surgical Procedure:  Colonoscopy    The risks, benefits, and alternatives of this procedure have been discussed with the patient or the responsible party- the patient understands and agrees to " proceed.

## 2022-03-07 NOTE — ANESTHESIA PREPROCEDURE EVALUATION
Anesthesia Evaluation     Patient summary reviewed and Nursing notes reviewed   no history of anesthetic complications:               Airway   Mallampati: II  TM distance: >3 FB  Neck ROM: full  no difficulty expected  Dental - normal exam     Pulmonary - normal exam   (+) asthma,recent URI resolved,     ROS comment: Hx/o asthma, normally rare MDI's.  Had a URI over the past week, has had to use MDI's bid-qid for the past 7-10d.  Cardiovascular - normal exam  Exercise tolerance: excellent (>7 METS)    Rhythm: regular  Rate: normal    (+) hypertension well controlled,       Neuro/Psych  (+) numbness,    GI/Hepatic/Renal/Endo    (+)   diabetes mellitus type 2 well controlled,     Musculoskeletal     (+) neck pain,   Abdominal  - normal exam   Substance History - negative use     OB/GYN          Other   arthritis,                        Anesthesia Plan    ASA 2     MAC     intravenous induction     Anesthetic plan, all risks, benefits, and alternatives have been provided, discussed and informed consent has been obtained with: patient.    Plan discussed with attending.

## 2022-03-07 NOTE — BRIEF OP NOTE
COLONOSCOPY  Progress Note    Shankar Daley  3/7/2022    Pre-op Diagnosis:   Encounter for screening for malignant neoplasm of colon [Z12.11]       Post-Op Diagnosis Codes:     * Encounter for screening for malignant neoplasm of colon [Z12.11]     * Diverticulosis [K57.90]     * Internal hemorrhoids [K64.8]    Procedure/CPT® Codes:        Procedure(s):  COLONOSCOPY TO CECUM AND TO TI WITH COLD BIOPSIES    Surgeon(s):  Shad Zambrano MD    Anesthesia: Monitored Anesthesia Care    Staff:   Endo Technician: Kenia Bravo  Endo Nurse: Amanda De Los Santos RN         Estimated Blood Loss: minimal    Urine Voided: * No values recorded between 3/7/2022 11:37 AM and 3/7/2022 11:58 AM *    Specimens:                Specimens     ID Source Type Tests Collected By Collected At Frozen?    A Large Intestine, Left / Descending Colon Tissue · TISSUE PATHOLOGY EXAM   Shad Zambrano MD 3/7/22 8983     Description: random descending colon biopsies    This specimen was not marked as sent.                Drains: * No LDAs found *    Findings: Colonoscopy with biopsies of the terminal ileum normal ileum mucosa.  Sigmoid diverticulosis and internal hemorrhoids were identified.  Biopsies of the descending colon were obtained to rule out microscopic colitis.    Complications: None          Shad Zambrano MD     Date: 3/7/2022  Time: 12:01 EST

## 2022-03-07 NOTE — DISCHARGE INSTRUCTIONS

## 2022-03-07 NOTE — EXTERNAL PATIENT INSTRUCTIONS
Patient Education   Table of Contents       Colonoscopy, Adult, Care After       Diverticulosis       Hemorrhoids     To view videos and all your education online visit,   [email or web address]   or scan this QR code with your smartphone.                  Colonoscopy, Adult, Care After     This sheet gives you information about how to care for yourself after your procedure. Your health care provider may also give you more specific instructions. If you have problems or questions, contact your health care provider.   What can I expect after the procedure?    After the procedure, it is common to have:       A small amount of blood in your stool for 24 hours after the procedure.       Some gas.       Mild cramping or bloating of your abdomen.     Follow these instructions at home:   Eating and drinking            Drink enough fluid to keep your urine pale yellow.       Follow instructions from your health care provider about eating or drinking restrictions.       Resume your normal diet as instructed by your health care provider. Avoid heavy or fried foods that are hard to digest.     Activity         Rest as told by your health care provider.       Avoid sitting for a long time without moving. Get up to take short walks every 1?2 hours. This is important to improve blood flow and breathing. Ask for help if you feel weak or unsteady.       Return to your normal activities as told by your health care provider. Ask your health care provider what activities are safe for you.     Managing cramping and bloating            Try walking around when you have cramps or feel bloated.      Apply heat to your abdomen as told by your health care provider. Use the heat source that your health care provider recommends, such as a moist heat pack or a heating pad.       Place a towel between your skin and the heat source.       Leave the heat on for 20?30 minutes.       Remove the heat if your skin turns bright red. This is  especially important if you are unable to feel pain, heat, or cold. You may have a greater risk of getting burned.       General instructions         If you were given a sedative during the procedure, it can affect you for several hours. Do not  drive or operate machinery until your health care provider says that it is safe.      For the first 24 hours after the procedure:      Do not  sign important documents.      Do not  drink alcohol.       Do your regular daily activities at a slower pace than normal.       Eat soft foods that are easy to digest.       Take over-the-counter and prescription medicines only as told by your health care provider.       Keep all follow-up visits as told by your health care provider. This is important.       Contact a health care provider if:         You have blood in your stool 2?3 days after the procedure.     Get help right away if you have:         More than a small spotting of blood in your stool.       Large blood clots in your stool.       Swelling of your abdomen.       Nausea or vomiting.       A fever.       Increasing pain in your abdomen that is not relieved with medicine.     Summary         After the procedure, it is common to have a small amount of blood in your stool. You may also have mild cramping and bloating of your abdomen.       If you were given a sedative during the procedure, it can affect you for several hours. Do not  drive or operate machinery until your health care provider says that it is safe.       Get help right away if you have a lot of blood in your stool, nausea or vomiting, a fever, or increased pain in your abdomen.     This information is not intended to replace advice given to you by your health care provider. Make sure you discuss any questions you have with your health care provider.     Document Released: 08/01/2005Document Revised: 12/11/2020Document Reviewed: 07/13/2020     [employer] Patient Education ? 2021 [employer]          Diverticulosis        Diverticulosis is a condition that develops when small pouches (diverticula) form in the wall of the large intestine (colon). The colon is where water is absorbed and stool (feces) is formed. The pouches form when the inside layer of the colon pushes through weak spots in the outer layers of the colon. You may have a few pouches or many of them.   The pouches usually do not cause problems unless they become inflamed or infected. When this happens, the condition is called diverticulitis.     What are the causes?   The cause of this condition is not known.   What increases the risk?    The following factors may make you more likely to develop this condition:       Being older than age 60. Your risk for this condition increases with age. Diverticulosis is rare among people younger than age 30. By age 80, many people have it.       Eating a low-fiber diet.       Having frequent constipation.       Being overweight.       Not getting enough exercise.       Smoking.       Taking over-the-counter pain medicines, like aspirin and ibuprofen.       Having a family history of diverticulosis.     What are the signs or symptoms?    In most people, there are no symptoms of this condition. If you do have symptoms, they may include:       Bloating.       Cramps in the abdomen.       Constipation or diarrhea.       Pain in the lower left side of the abdomen.     How is this diagnosed?    Because diverticulosis usually has no symptoms, it is most often diagnosed during an exam for other colon problems. The condition may be diagnosed by:       Using a flexible scope to examine the colon (colonoscopy).       Taking an X-ray of the colon after dye has been put into the colon (barium enema).       Having a CT scan.     How is this treated?       You may not need treatment for this condition. Your health care provider may recommend treatment to prevent problems. You may need treatment if you have symptoms or  if you previously had diverticulitis. Treatment may include:       Eating a high-fiber diet.       Taking a fiber supplement.       Taking a live bacteria supplement (probiotic).       Taking medicine to relax your colon.       Follow these instructions at home:   Medicines         Take over-the-counter and prescription medicines only as told by your health care provider.       If told by your health care provider, take a fiber supplement or probiotic.     Constipation prevention       Your condition may cause constipation. To prevent or treat constipation, you may need to:       Drink enough fluid to keep your urine pale yellow.       Take over-the-counter or prescription medicines.       Eat foods that are high in fiber, such as beans, whole grains, and fresh fruits and vegetables.       Limit foods that are high in fat and processed sugars, such as fried or sweet foods.     General instructions         Try not to strain when you have a bowel movement.       Keep all follow-up visits as told by your health care provider. This is important.       Contact a health care provider if you:         Have pain in your abdomen.       Have bloating.       Have cramps.       Have not had a bowel movement in 3 days.     Get help right away if:         Your pain gets worse.       Your bloating becomes very bad.       You have a fever or chills, and your symptoms suddenly get worse.       You vomit.       You have bowel movements that are bloody or black.       You have bleeding from your rectum.     Summary         Diverticulosis is a condition that develops when small pouches (diverticula) form in the wall of the large intestine (colon).       You may have a few pouches or many of them.       This condition is most often diagnosed during an exam for other colon problems.       Treatment may include increasing the fiber in your diet, taking supplements, or taking medicines.     This information is not intended to replace advice  given to you by your health care provider. Make sure you discuss any questions you have with your health care provider.     Document Released: 09/14/2005Document Revised: 07/16/2020Document Reviewed: 07/16/2020     [employer] Patient Education ? 2021 [employer]         Hemorrhoids         Hemorrhoids are swollen veins in and around the rectum or anus. There are two types of hemorrhoids:       Internal hemorrhoids. These occur in the veins that are just inside the rectum. They may poke through to the outside and become irritated and painful.       External hemorrhoids. These occur in the veins that are outside the anus and can be felt as a painful swelling or hard lump near the anus.     Most hemorrhoids do not cause serious problems, and they can be managed with home treatments such as diet and lifestyle changes. If home treatments do not help the symptoms, procedures can be done to shrink or remove the hemorrhoids.     What are the causes?    This condition is caused by increased pressure in the anal area. This pressure may result from various things, including:       Constipation.       Straining to have a bowel movement.       Diarrhea.       Pregnancy.       Obesity.       Sitting for long periods of time.       Heavy lifting or other activity that causes you to strain.       Anal sex.       Riding a bike for a long period of time.     What are the signs or symptoms?    Symptoms of this condition include:       Pain.       Anal itching or irritation.       Rectal bleeding.       Leakage of stool (feces).       Anal swelling.       One or more lumps around the anus.     How is this diagnosed?    This condition can often be diagnosed through a visual exam. Other exams or tests may also be done, such as:       An exam that involves feeling the rectal area with a gloved hand (digital rectal exam).       An exam of the anal canal that is done using a small tube (anoscope).       A blood test, if you have lost a  significant amount of blood.       A test to look inside the colon using a flexible tube with a camera on the end (sigmoidoscopy or colonoscopy).     How is this treated?    This condition can usually be treated at home. However, various procedures may be done if dietary changes, lifestyle changes, and other home treatments do not help your symptoms. These procedures can help make the hemorrhoids smaller or remove them completely. Some of these procedures involve surgery, and others do not. Common procedures include:       Rubber band ligation. Rubber bands are placed at the base of the hemorrhoids to cut off their blood supply.       Sclerotherapy. Medicine is injected into the hemorrhoids to shrink them.       Infrared coagulation. A type of light energy is used to get rid of the hemorrhoids.       Hemorrhoidectomy surgery. The hemorrhoids are surgically removed, and the veins that supply them are tied off.       Stapled hemorrhoidopexy surgery. The surgeon staples the base of the hemorrhoid to the rectal wall.     Follow these instructions at home:   Eating and drinking            Eat foods that have a lot of fiber in them, such as whole grains, beans, nuts, fruits, and vegetables.       Ask your health care provider about taking products that have added fiber (fiber supplements).       Reduce the amount of fat in your diet. You can do this by eating low-fat dairy products, eating less red meat, and avoiding processed foods.       Drink enough fluid to keep your urine pale yellow.     Managing pain and swelling            Take warm sitz baths for 20 minutes, 3?4 times a day to ease pain and discomfort. You may do this in a bathtub or using a portable sitz bath that fits over the toilet.      If directed, apply ice to the affected area. Using ice packs between sitz baths may be helpful.       Put ice in a plastic bag.       Place a towel between your skin and the bag.       Leave the ice on for 20 minutes, 2?3  times a day.       General instructions         Take over-the-counter and prescription medicines only as told by your health care provider.       Use medicated creams or suppositories as told.       Get regular exercise. Ask your health care provider how much and what kind of exercise is best for you. In general, you should do moderate exercise for at least 30 minutes on most days of the week (150 minutes each week). This can include activities such as walking, biking, or yoga.       Go to the bathroom when you have the urge to have a bowel movement. Do not  wait.       Avoid straining to have bowel movements.       Keep the anal area dry and clean. Use wet toilet paper or moist towelettes after a bowel movement.      Do not  sit on the toilet for long periods of time. This increases blood pooling and pain.       Keep all follow-up visits as told by your health care provider. This is important.       Contact a health care provider if you have:         Increasing pain and swelling that are not controlled by treatment or medicine.       Difficulty having a bowel movement, or you are unable to have a bowel movement.       Pain or inflammation outside the area of the hemorrhoids.     Get help right away if you have:         Uncontrolled bleeding from your rectum.     Summary         Hemorrhoids are swollen veins in and around the rectum or anus.       Most hemorrhoids can be managed with home treatments such as diet and lifestyle changes.       Taking warm sitz baths can help ease pain and discomfort.       In severe cases, procedures or surgery can be done to shrink or remove the hemorrhoids.     This information is not intended to replace advice given to you by your health care provider. Make sure you discuss any questions you have with your health care provider.     Document Released: 12/15/2001Document Revised: 05/15/2020Document Reviewed: 05/09/2019     [employer] Patient Education ? 2021 [employer]

## 2022-03-07 NOTE — ANESTHESIA POSTPROCEDURE EVALUATION
"Patient: Shankar Daley    Procedure Summary     Date: 03/07/22 Room / [facility]:  [facility]    Anesthesia Start: 1139 Anesthesia Stop: 1204    Procedure: COLONOSCOPY TO CECUM AND TO TI WITH COLD BIOPSIES (N/A ) Diagnosis:       Encounter for screening for malignant neoplasm of colon      Diverticulosis      Internal hemorrhoids      (Encounter for screening for malignant neoplasm of colon [Z12.11])    Surgeons: Shad Zambrano MD Provider: Ronald Nair MD    Anesthesia Type: MAC ASA Status: 2          Anesthesia Type: MAC    Vitals  Vitals Value Taken Time   /90 03/07/22 1224   Temp     Pulse 89 03/07/22 1224   Resp 16 03/07/22 1224   SpO2 97 % 03/07/22 1224           Post Anesthesia Care and Evaluation    Pain management: adequate  Airway patency: patent  Anesthetic complications: No anesthetic complications    Cardiovascular status: acceptable  Respiratory status: acceptable  Hydration status: acceptable    Comments: /90   Pulse 89   Resp 16   Ht 177.8 cm (70\")   Wt 83.9 kg (185 lb)   SpO2 97%   BMI 26.54 kg/m²         " ABDOMEN

## 2022-03-08 LAB
LAB AP CASE REPORT: NORMAL
PATH REPORT.FINAL DX SPEC: NORMAL
PATH REPORT.GROSS SPEC: NORMAL

## 2022-03-16 ENCOUNTER — TELEPHONE (OUTPATIENT)
Dept: GASTROENTEROLOGY | Facility: CLINIC | Age: 67
End: 2022-03-16

## 2022-03-16 NOTE — TELEPHONE ENCOUNTER
----- Message from Shad Zambrano MD sent at 3/16/2022 12:23 PM EDT -----  Biopsies normal.  If still having episodic diarrhea begin Pamelor 10 mg at bedtime

## 2022-03-16 NOTE — TELEPHONE ENCOUNTER
Called pt and advised of Dr Zambrano's note. Verb understanding.   Pt wants to think about taking the pamelor and will call us back if he would like to to take med.

## 2022-07-23 ENCOUNTER — APPOINTMENT (OUTPATIENT)
Dept: GENERAL RADIOLOGY | Facility: HOSPITAL | Age: 67
End: 2022-07-23

## 2022-07-23 ENCOUNTER — HOSPITAL ENCOUNTER (EMERGENCY)
Facility: HOSPITAL | Age: 67
Discharge: HOME | End: 2022-07-24
Attending: EMERGENCY MEDICINE | Admitting: EMERGENCY MEDICINE

## 2022-07-23 DIAGNOSIS — R11.2 NAUSEA AND VOMITING, UNSPECIFIED VOMITING TYPE: Primary | ICD-10-CM

## 2022-07-23 DIAGNOSIS — U07.1 COVID-19: ICD-10-CM

## 2022-07-23 DIAGNOSIS — R19.7 DIARRHEA, UNSPECIFIED TYPE: ICD-10-CM

## 2022-07-23 LAB
BASOPHILS # BLD AUTO: 0.04 10*3/MM3 (ref 0–0.2)
BASOPHILS NFR BLD AUTO: 0.6 % (ref 0–1.5)
DEPRECATED RDW RBC AUTO: 40.9 FL (ref 37–54)
EOSINOPHIL # BLD AUTO: 0.11 10*3/MM3 (ref 0–0.4)
EOSINOPHIL NFR BLD AUTO: 1.7 % (ref 0.3–6.2)
ERYTHROCYTE [DISTWIDTH] IN BLOOD BY AUTOMATED COUNT: 12.4 % (ref 12.3–15.4)
HCT VFR BLD AUTO: 46 % (ref 37.5–51)
HGB BLD-MCNC: 15.9 G/DL (ref 13–17.7)
IMM GRANULOCYTES # BLD AUTO: 0.06 10*3/MM3 (ref 0–0.05)
IMM GRANULOCYTES NFR BLD AUTO: 0.9 % (ref 0–0.5)
LYMPHOCYTES # BLD AUTO: 1.08 10*3/MM3 (ref 0.7–3.1)
LYMPHOCYTES NFR BLD AUTO: 17.1 % (ref 19.6–45.3)
MCH RBC QN AUTO: 31.6 PG (ref 26.6–33)
MCHC RBC AUTO-ENTMCNC: 34.6 G/DL (ref 31.5–35.7)
MCV RBC AUTO: 91.5 FL (ref 79–97)
MONOCYTES # BLD AUTO: 0.72 10*3/MM3 (ref 0.1–0.9)
MONOCYTES NFR BLD AUTO: 11.4 % (ref 5–12)
NEUTROPHILS NFR BLD AUTO: 4.32 10*3/MM3 (ref 1.7–7)
NEUTROPHILS NFR BLD AUTO: 68.3 % (ref 42.7–76)
NRBC BLD AUTO-RTO: 0 /100 WBC (ref 0–0.2)
PLATELET # BLD AUTO: 213 10*3/MM3 (ref 140–450)
PMV BLD AUTO: 9.8 FL (ref 6–12)
RBC # BLD AUTO: 5.03 10*6/MM3 (ref 4.14–5.8)
WBC NRBC COR # BLD: 6.33 10*3/MM3 (ref 3.4–10.8)

## 2022-07-23 PROCEDURE — 80053 COMPREHEN METABOLIC PANEL: CPT | Performed by: EMERGENCY MEDICINE

## 2022-07-23 PROCEDURE — 83690 ASSAY OF LIPASE: CPT | Performed by: EMERGENCY MEDICINE

## 2022-07-23 PROCEDURE — 99283 EMERGENCY DEPT VISIT LOW MDM: CPT

## 2022-07-23 PROCEDURE — 36415 COLL VENOUS BLD VENIPUNCTURE: CPT

## 2022-07-23 PROCEDURE — 85025 COMPLETE CBC W/AUTO DIFF WBC: CPT | Performed by: EMERGENCY MEDICINE

## 2022-07-23 PROCEDURE — 71045 X-RAY EXAM CHEST 1 VIEW: CPT

## 2022-07-23 PROCEDURE — 96374 THER/PROPH/DIAG INJ IV PUSH: CPT

## 2022-07-23 PROCEDURE — 25010000002 ONDANSETRON PER 1 MG: Performed by: EMERGENCY MEDICINE

## 2022-07-23 RX ORDER — ONDANSETRON 2 MG/ML
4 INJECTION INTRAMUSCULAR; INTRAVENOUS ONCE
Status: COMPLETED | OUTPATIENT
Start: 2022-07-23 | End: 2022-07-23

## 2022-07-23 RX ORDER — SODIUM CHLORIDE 0.9 % (FLUSH) 0.9 %
10 SYRINGE (ML) INJECTION AS NEEDED
Status: DISCONTINUED | OUTPATIENT
Start: 2022-07-23 | End: 2022-07-24 | Stop reason: HOSPADM

## 2022-07-23 RX ADMIN — ONDANSETRON 4 MG: 2 INJECTION INTRAMUSCULAR; INTRAVENOUS at 23:25

## 2022-07-23 RX ADMIN — SODIUM CHLORIDE 1000 ML: 9 INJECTION, SOLUTION INTRAVENOUS at 23:25

## 2022-07-24 VITALS
OXYGEN SATURATION: 99 % | HEART RATE: 86 BPM | BODY MASS INDEX: 26.48 KG/M2 | SYSTOLIC BLOOD PRESSURE: 128 MMHG | WEIGHT: 185 LBS | HEIGHT: 70 IN | RESPIRATION RATE: 18 BRPM | TEMPERATURE: 98.8 F | DIASTOLIC BLOOD PRESSURE: 85 MMHG

## 2022-07-24 LAB
ALBUMIN SERPL-MCNC: 5 G/DL (ref 3.5–5.2)
ALBUMIN/GLOB SERPL: 2 G/DL
ALP SERPL-CCNC: 85 U/L (ref 39–117)
ALT SERPL W P-5'-P-CCNC: 35 U/L (ref 1–41)
ANION GAP SERPL CALCULATED.3IONS-SCNC: 16 MMOL/L (ref 5–15)
AST SERPL-CCNC: 26 U/L (ref 1–40)
BILIRUB SERPL-MCNC: 0.7 MG/DL (ref 0–1.2)
BUN SERPL-MCNC: 18 MG/DL (ref 8–23)
BUN/CREAT SERPL: 18.8 (ref 7–25)
CALCIUM SPEC-SCNC: 9.4 MG/DL (ref 8.6–10.5)
CHLORIDE SERPL-SCNC: 102 MMOL/L (ref 98–107)
CO2 SERPL-SCNC: 19 MMOL/L (ref 22–29)
CREAT SERPL-MCNC: 0.96 MG/DL (ref 0.76–1.27)
EGFRCR SERPLBLD CKD-EPI 2021: 86.1 ML/MIN/1.73
GLOBULIN UR ELPH-MCNC: 2.5 GM/DL
GLUCOSE SERPL-MCNC: 174 MG/DL (ref 65–99)
LIPASE SERPL-CCNC: 42 U/L (ref 13–60)
POTASSIUM SERPL-SCNC: 4.5 MMOL/L (ref 3.5–5.2)
PROT SERPL-MCNC: 7.5 G/DL (ref 6–8.5)
SODIUM SERPL-SCNC: 137 MMOL/L (ref 136–145)

## 2022-07-24 PROCEDURE — 25010000002 PROCHLORPERAZINE 10 MG/2ML SOLUTION: Performed by: EMERGENCY MEDICINE

## 2022-07-24 PROCEDURE — 96375 TX/PRO/DX INJ NEW DRUG ADDON: CPT

## 2022-07-24 RX ORDER — PROCHLORPERAZINE EDISYLATE 5 MG/ML
10 INJECTION INTRAMUSCULAR; INTRAVENOUS ONCE
Status: COMPLETED | OUTPATIENT
Start: 2022-07-24 | End: 2022-07-24

## 2022-07-24 RX ORDER — PROCHLORPERAZINE MALEATE 10 MG
10 TABLET ORAL EVERY 6 HOURS PRN
Qty: 12 TABLET | Refills: 0 | Status: ON HOLD | OUTPATIENT
Start: 2022-07-24 | End: 2022-08-02

## 2022-07-24 RX ADMIN — PROCHLORPERAZINE EDISYLATE 10 MG: 5 INJECTION INTRAMUSCULAR; INTRAVENOUS at 00:29

## 2022-07-26 ENCOUNTER — APPOINTMENT (OUTPATIENT)
Dept: GENERAL RADIOLOGY | Facility: HOSPITAL | Age: 67
End: 2022-07-26

## 2022-07-26 ENCOUNTER — HOSPITAL ENCOUNTER (OUTPATIENT)
Facility: HOSPITAL | Age: 67
Setting detail: OBSERVATION
Discharge: HOME | End: 2022-07-27
Attending: EMERGENCY MEDICINE | Admitting: EMERGENCY MEDICINE

## 2022-07-26 DIAGNOSIS — R19.7 DIARRHEA, UNSPECIFIED TYPE: ICD-10-CM

## 2022-07-26 DIAGNOSIS — E86.0 DEHYDRATION: ICD-10-CM

## 2022-07-26 DIAGNOSIS — U07.1 COVID-19: Primary | ICD-10-CM

## 2022-07-26 LAB
ALBUMIN SERPL-MCNC: 5.1 G/DL (ref 3.5–5.2)
ALBUMIN/GLOB SERPL: 2 G/DL
ALP SERPL-CCNC: 86 U/L (ref 39–117)
ALT SERPL W P-5'-P-CCNC: 29 U/L (ref 1–41)
ANION GAP SERPL CALCULATED.3IONS-SCNC: 16.4 MMOL/L (ref 5–15)
AST SERPL-CCNC: 26 U/L (ref 1–40)
BACTERIA UR QL AUTO: NORMAL /HPF
BASOPHILS # BLD AUTO: 0.03 10*3/MM3 (ref 0–0.2)
BASOPHILS NFR BLD AUTO: 0.4 % (ref 0–1.5)
BILIRUB SERPL-MCNC: 0.7 MG/DL (ref 0–1.2)
BILIRUB UR QL STRIP: NEGATIVE
BUN SERPL-MCNC: 35 MG/DL (ref 8–23)
BUN/CREAT SERPL: 25.2 (ref 7–25)
CALCIUM SPEC-SCNC: 8.7 MG/DL (ref 8.6–10.5)
CHLORIDE SERPL-SCNC: 108 MMOL/L (ref 98–107)
CLARITY UR: CLEAR
CO2 SERPL-SCNC: 15.6 MMOL/L (ref 22–29)
COLOR UR: ABNORMAL
CREAT SERPL-MCNC: 1.39 MG/DL (ref 0.76–1.27)
DEPRECATED RDW RBC AUTO: 40.4 FL (ref 37–54)
EGFRCR SERPLBLD CKD-EPI 2021: 55.2 ML/MIN/1.73
EOSINOPHIL # BLD AUTO: 0.18 10*3/MM3 (ref 0–0.4)
EOSINOPHIL NFR BLD AUTO: 2.5 % (ref 0.3–6.2)
ERYTHROCYTE [DISTWIDTH] IN BLOOD BY AUTOMATED COUNT: 12.4 % (ref 12.3–15.4)
GLOBULIN UR ELPH-MCNC: 2.6 GM/DL
GLUCOSE SERPL-MCNC: 146 MG/DL (ref 65–99)
GLUCOSE UR STRIP-MCNC: NEGATIVE MG/DL
HCT VFR BLD AUTO: 45.8 % (ref 37.5–51)
HGB BLD-MCNC: 16.3 G/DL (ref 13–17.7)
HGB UR QL STRIP.AUTO: NEGATIVE
HOLD SPECIMEN: NORMAL
HOLD SPECIMEN: NORMAL
HYALINE CASTS UR QL AUTO: NORMAL /LPF
IMM GRANULOCYTES # BLD AUTO: 0.05 10*3/MM3 (ref 0–0.05)
IMM GRANULOCYTES NFR BLD AUTO: 0.7 % (ref 0–0.5)
KETONES UR QL STRIP: ABNORMAL
LEUKOCYTE ESTERASE UR QL STRIP.AUTO: NEGATIVE
LYMPHOCYTES # BLD AUTO: 1.39 10*3/MM3 (ref 0.7–3.1)
LYMPHOCYTES NFR BLD AUTO: 19.5 % (ref 19.6–45.3)
MAGNESIUM SERPL-MCNC: 1.5 MG/DL (ref 1.6–2.4)
MCH RBC QN AUTO: 32.3 PG (ref 26.6–33)
MCHC RBC AUTO-ENTMCNC: 35.6 G/DL (ref 31.5–35.7)
MCV RBC AUTO: 90.7 FL (ref 79–97)
MONOCYTES # BLD AUTO: 0.91 10*3/MM3 (ref 0.1–0.9)
MONOCYTES NFR BLD AUTO: 12.8 % (ref 5–12)
NEUTROPHILS NFR BLD AUTO: 4.56 10*3/MM3 (ref 1.7–7)
NEUTROPHILS NFR BLD AUTO: 64.1 % (ref 42.7–76)
NITRITE UR QL STRIP: NEGATIVE
NRBC BLD AUTO-RTO: 0 /100 WBC (ref 0–0.2)
PH UR STRIP.AUTO: 5.5 [PH] (ref 5–8)
PLATELET # BLD AUTO: 198 10*3/MM3 (ref 140–450)
PMV BLD AUTO: 9.4 FL (ref 6–12)
POTASSIUM SERPL-SCNC: 4.6 MMOL/L (ref 3.5–5.2)
PROT SERPL-MCNC: 7.7 G/DL (ref 6–8.5)
PROT UR QL STRIP: ABNORMAL
RBC # BLD AUTO: 5.05 10*6/MM3 (ref 4.14–5.8)
RBC # UR STRIP: NORMAL /HPF
REF LAB TEST METHOD: NORMAL
SARS-COV-2 RNA RESP QL NAA+PROBE: DETECTED
SODIUM SERPL-SCNC: 140 MMOL/L (ref 136–145)
SP GR UR STRIP: 1.03 (ref 1–1.03)
SQUAMOUS #/AREA URNS HPF: NORMAL /HPF
UROBILINOGEN UR QL STRIP: ABNORMAL
WBC # UR STRIP: NORMAL /HPF
WBC NRBC COR # BLD: 7.12 10*3/MM3 (ref 3.4–10.8)
WHOLE BLOOD HOLD COAG: NORMAL
WHOLE BLOOD HOLD SPECIMEN: NORMAL

## 2022-07-26 PROCEDURE — 99284 EMERGENCY DEPT VISIT MOD MDM: CPT

## 2022-07-26 PROCEDURE — 25010000002 MAGNESIUM SULFATE 2 GM/50ML SOLUTION: Performed by: EMERGENCY MEDICINE

## 2022-07-26 PROCEDURE — 36415 COLL VENOUS BLD VENIPUNCTURE: CPT

## 2022-07-26 PROCEDURE — G0378 HOSPITAL OBSERVATION PER HR: HCPCS

## 2022-07-26 PROCEDURE — U0003 INFECTIOUS AGENT DETECTION BY NUCLEIC ACID (DNA OR RNA); SEVERE ACUTE RESPIRATORY SYNDROME CORONAVIRUS 2 (SARS-COV-2) (CORONAVIRUS DISEASE [COVID-19]), AMPLIFIED PROBE TECHNIQUE, MAKING USE OF HIGH THROUGHPUT TECHNOLOGIES AS DESCRIBED BY CMS-2020-01-R: HCPCS | Performed by: EMERGENCY MEDICINE

## 2022-07-26 PROCEDURE — C9803 HOPD COVID-19 SPEC COLLECT: HCPCS

## 2022-07-26 PROCEDURE — 85025 COMPLETE CBC W/AUTO DIFF WBC: CPT | Performed by: NURSE PRACTITIONER

## 2022-07-26 PROCEDURE — 71045 X-RAY EXAM CHEST 1 VIEW: CPT

## 2022-07-26 PROCEDURE — 96372 THER/PROPH/DIAG INJ SC/IM: CPT

## 2022-07-26 PROCEDURE — 80053 COMPREHEN METABOLIC PANEL: CPT | Performed by: NURSE PRACTITIONER

## 2022-07-26 PROCEDURE — 25010000002 DICYCLOMINE PER 20 MG: Performed by: NURSE PRACTITIONER

## 2022-07-26 PROCEDURE — 83735 ASSAY OF MAGNESIUM: CPT | Performed by: NURSE PRACTITIONER

## 2022-07-26 PROCEDURE — 81001 URINALYSIS AUTO W/SCOPE: CPT | Performed by: NURSE PRACTITIONER

## 2022-07-26 RX ORDER — NICOTINE POLACRILEX 4 MG
15 LOZENGE BUCCAL
Status: DISCONTINUED | OUTPATIENT
Start: 2022-07-26 | End: 2022-07-27 | Stop reason: HOSPADM

## 2022-07-26 RX ORDER — DEXTROSE MONOHYDRATE 25 G/50ML
25 INJECTION, SOLUTION INTRAVENOUS
Status: DISCONTINUED | OUTPATIENT
Start: 2022-07-26 | End: 2022-07-27 | Stop reason: HOSPADM

## 2022-07-26 RX ORDER — NITROGLYCERIN 0.4 MG/1
0.4 TABLET SUBLINGUAL
Status: DISCONTINUED | OUTPATIENT
Start: 2022-07-26 | End: 2022-07-27 | Stop reason: HOSPADM

## 2022-07-26 RX ORDER — ATORVASTATIN CALCIUM 20 MG/1
40 TABLET, FILM COATED ORAL DAILY
Status: DISCONTINUED | OUTPATIENT
Start: 2022-07-26 | End: 2022-07-27 | Stop reason: HOSPADM

## 2022-07-26 RX ORDER — BUDESONIDE AND FORMOTEROL FUMARATE DIHYDRATE 160; 4.5 UG/1; UG/1
2 AEROSOL RESPIRATORY (INHALATION)
Status: DISCONTINUED | OUTPATIENT
Start: 2022-07-26 | End: 2022-07-27 | Stop reason: HOSPADM

## 2022-07-26 RX ORDER — CLONIDINE HYDROCHLORIDE 0.1 MG/1
0.3 TABLET ORAL DAILY
Status: DISCONTINUED | OUTPATIENT
Start: 2022-07-26 | End: 2022-07-27 | Stop reason: HOSPADM

## 2022-07-26 RX ORDER — SODIUM CHLORIDE, SODIUM LACTATE, POTASSIUM CHLORIDE, CALCIUM CHLORIDE 600; 310; 30; 20 MG/100ML; MG/100ML; MG/100ML; MG/100ML
100 INJECTION, SOLUTION INTRAVENOUS CONTINUOUS
Status: DISCONTINUED | OUTPATIENT
Start: 2022-07-26 | End: 2022-07-27 | Stop reason: HOSPADM

## 2022-07-26 RX ORDER — MAGNESIUM SULFATE HEPTAHYDRATE 40 MG/ML
2 INJECTION, SOLUTION INTRAVENOUS ONCE
Status: COMPLETED | OUTPATIENT
Start: 2022-07-26 | End: 2022-07-26

## 2022-07-26 RX ORDER — ONDANSETRON 2 MG/ML
4 INJECTION INTRAMUSCULAR; INTRAVENOUS EVERY 6 HOURS PRN
Status: DISCONTINUED | OUTPATIENT
Start: 2022-07-26 | End: 2022-07-27 | Stop reason: HOSPADM

## 2022-07-26 RX ORDER — SODIUM CHLORIDE 0.9 % (FLUSH) 0.9 %
10 SYRINGE (ML) INJECTION EVERY 12 HOURS SCHEDULED
Status: DISCONTINUED | OUTPATIENT
Start: 2022-07-26 | End: 2022-07-27 | Stop reason: HOSPADM

## 2022-07-26 RX ORDER — DICYCLOMINE HYDROCHLORIDE 10 MG/ML
20 INJECTION INTRAMUSCULAR ONCE
Status: COMPLETED | OUTPATIENT
Start: 2022-07-26 | End: 2022-07-26

## 2022-07-26 RX ORDER — ALBUTEROL SULFATE 2.5 MG/3ML
2.5 SOLUTION RESPIRATORY (INHALATION) EVERY 6 HOURS PRN
Refills: 3 | Status: DISCONTINUED | OUTPATIENT
Start: 2022-07-26 | End: 2022-07-27 | Stop reason: HOSPADM

## 2022-07-26 RX ORDER — ONDANSETRON 4 MG/1
4 TABLET, FILM COATED ORAL EVERY 6 HOURS PRN
Status: DISCONTINUED | OUTPATIENT
Start: 2022-07-26 | End: 2022-07-27 | Stop reason: HOSPADM

## 2022-07-26 RX ORDER — SODIUM CHLORIDE 0.9 % (FLUSH) 0.9 %
10 SYRINGE (ML) INJECTION AS NEEDED
Status: DISCONTINUED | OUTPATIENT
Start: 2022-07-26 | End: 2022-07-27 | Stop reason: HOSPADM

## 2022-07-26 RX ADMIN — SODIUM CHLORIDE, POTASSIUM CHLORIDE, SODIUM LACTATE AND CALCIUM CHLORIDE 100 ML/HR: 600; 310; 30; 20 INJECTION, SOLUTION INTRAVENOUS at 21:45

## 2022-07-26 RX ADMIN — ATORVASTATIN CALCIUM 40 MG: 20 TABLET, FILM COATED ORAL at 17:42

## 2022-07-26 RX ADMIN — SODIUM CHLORIDE 1000 ML: 9 INJECTION, SOLUTION INTRAVENOUS at 08:45

## 2022-07-26 RX ADMIN — Medication 10 ML: at 20:00

## 2022-07-26 RX ADMIN — SODIUM CHLORIDE, POTASSIUM CHLORIDE, SODIUM LACTATE AND CALCIUM CHLORIDE 1000 ML: 600; 310; 30; 20 INJECTION, SOLUTION INTRAVENOUS at 13:25

## 2022-07-26 RX ADMIN — MAGNESIUM SULFATE HEPTAHYDRATE 2 G: 40 INJECTION, SOLUTION INTRAVENOUS at 19:57

## 2022-07-26 RX ADMIN — CLONIDINE HYDROCHLORIDE 0.3 MG: 0.1 TABLET ORAL at 17:42

## 2022-07-26 RX ADMIN — DICYCLOMINE HYDROCHLORIDE 20 MG: 20 INJECTION INTRAMUSCULAR at 13:24

## 2022-07-27 ENCOUNTER — READMISSION MANAGEMENT (OUTPATIENT)
Dept: CALL CENTER | Facility: HOSPITAL | Age: 67
End: 2022-07-27

## 2022-07-27 ENCOUNTER — APPOINTMENT (OUTPATIENT)
Dept: CT IMAGING | Facility: HOSPITAL | Age: 67
End: 2022-07-27

## 2022-07-27 VITALS
RESPIRATION RATE: 16 BRPM | HEART RATE: 71 BPM | BODY MASS INDEX: 26.48 KG/M2 | OXYGEN SATURATION: 99 % | HEIGHT: 70 IN | WEIGHT: 185 LBS | TEMPERATURE: 98.4 F | SYSTOLIC BLOOD PRESSURE: 155 MMHG | DIASTOLIC BLOOD PRESSURE: 68 MMHG

## 2022-07-27 LAB
ALBUMIN SERPL-MCNC: 4.1 G/DL (ref 3.5–5.2)
ALBUMIN/GLOB SERPL: 1.9 G/DL
ALP SERPL-CCNC: 75 U/L (ref 39–117)
ALT SERPL W P-5'-P-CCNC: 26 U/L (ref 1–41)
ANION GAP SERPL CALCULATED.3IONS-SCNC: 13.4 MMOL/L (ref 5–15)
AST SERPL-CCNC: 21 U/L (ref 1–40)
BILIRUB SERPL-MCNC: 0.8 MG/DL (ref 0–1.2)
BUN SERPL-MCNC: 18 MG/DL (ref 8–23)
BUN/CREAT SERPL: 22.2 (ref 7–25)
CALCIUM SPEC-SCNC: 8 MG/DL (ref 8.6–10.5)
CHLORIDE SERPL-SCNC: 105 MMOL/L (ref 98–107)
CO2 SERPL-SCNC: 16.6 MMOL/L (ref 22–29)
CREAT SERPL-MCNC: 0.81 MG/DL (ref 0.76–1.27)
DEPRECATED RDW RBC AUTO: 43.8 FL (ref 37–54)
EGFRCR SERPLBLD CKD-EPI 2021: 96 ML/MIN/1.73
ERYTHROCYTE [DISTWIDTH] IN BLOOD BY AUTOMATED COUNT: 12.7 % (ref 12.3–15.4)
GLOBULIN UR ELPH-MCNC: 2.2 GM/DL
GLUCOSE BLDC GLUCOMTR-MCNC: 120 MG/DL (ref 70–130)
GLUCOSE SERPL-MCNC: 130 MG/DL (ref 65–99)
HCT VFR BLD AUTO: 42.5 % (ref 37.5–51)
HGB BLD-MCNC: 14 G/DL (ref 13–17.7)
MAGNESIUM SERPL-MCNC: 2 MG/DL (ref 1.6–2.4)
MCH RBC QN AUTO: 30.8 PG (ref 26.6–33)
MCHC RBC AUTO-ENTMCNC: 32.9 G/DL (ref 31.5–35.7)
MCV RBC AUTO: 93.6 FL (ref 79–97)
PLATELET # BLD AUTO: 192 10*3/MM3 (ref 140–450)
PMV BLD AUTO: 9.5 FL (ref 6–12)
POTASSIUM SERPL-SCNC: 4.3 MMOL/L (ref 3.5–5.2)
PROT SERPL-MCNC: 6.3 G/DL (ref 6–8.5)
RBC # BLD AUTO: 4.54 10*6/MM3 (ref 4.14–5.8)
SODIUM SERPL-SCNC: 135 MMOL/L (ref 136–145)
WBC NRBC COR # BLD: 6.88 10*3/MM3 (ref 3.4–10.8)

## 2022-07-27 PROCEDURE — G0378 HOSPITAL OBSERVATION PER HR: HCPCS

## 2022-07-27 PROCEDURE — 82962 GLUCOSE BLOOD TEST: CPT

## 2022-07-27 PROCEDURE — 80053 COMPREHEN METABOLIC PANEL: CPT | Performed by: STUDENT IN AN ORGANIZED HEALTH CARE EDUCATION/TRAINING PROGRAM

## 2022-07-27 PROCEDURE — 96360 HYDRATION IV INFUSION INIT: CPT

## 2022-07-27 PROCEDURE — 85027 COMPLETE CBC AUTOMATED: CPT

## 2022-07-27 PROCEDURE — 74176 CT ABD & PELVIS W/O CONTRAST: CPT

## 2022-07-27 PROCEDURE — 83735 ASSAY OF MAGNESIUM: CPT | Performed by: STUDENT IN AN ORGANIZED HEALTH CARE EDUCATION/TRAINING PROGRAM

## 2022-07-27 RX ADMIN — Medication 10 ML: at 08:37

## 2022-07-27 RX ADMIN — CLONIDINE HYDROCHLORIDE 0.3 MG: 0.1 TABLET ORAL at 08:36

## 2022-07-27 RX ADMIN — ATORVASTATIN CALCIUM 40 MG: 20 TABLET, FILM COATED ORAL at 08:36

## 2022-07-27 RX ADMIN — AMLODIPINE BESYLATE: 5 TABLET ORAL at 10:42

## 2022-07-27 RX ADMIN — SODIUM CHLORIDE, POTASSIUM CHLORIDE, SODIUM LACTATE AND CALCIUM CHLORIDE 500 ML: 600; 310; 30; 20 INJECTION, SOLUTION INTRAVENOUS at 10:42

## 2022-07-27 NOTE — OUTREACH NOTE
Prep Survey    Flowsheet Row Responses   [facility] facility patient discharged from? [facility]   Is LACE score < 7 ? Yes   Emergency Room discharge w/ pulse ox? No   Eligibility Readm Mgmt   Discharge diagnosis Diarrhea  COVID19 Detected     Does the patient have one of the following disease processes/diagnoses(primary or secondary)? COVID-19   Does the patient have Home health ordered? No   Is there a DME ordered? No   Prep survey completed? Yes          NATHANIEL WILSON - Registered Nurse

## 2022-07-28 ENCOUNTER — READMISSION MANAGEMENT (OUTPATIENT)
Dept: CALL CENTER | Facility: HOSPITAL | Age: 67
End: 2022-07-28

## 2022-07-28 NOTE — OUTREACH NOTE
COVID-19 Week 1 Survey    Flowsheet Row Responses   [facility] patient discharged from? [place]   Does the patient have one of the following disease processes/diagnoses(primary or secondary)? COVID-19   COVID-19 underlying condition? None   Call Number Call 1   Week 1 Call successful? Yes   Call start time 0822   Call end time 0825   Discharge diagnosis Diarrhea  COVID19 Detected     Meds reviewed with patient/caregiver? Yes   Is the patient having any side effects they believe may be caused by any medication additions or changes? No   Does the patient have all medications ordered at discharge? N/A   Is the patient taking all medications as directed (includes completed medication regime)? Yes   Medication comments No new medications added   Does the patient have a primary care provider?  Yes   Does the patient have an appointment with their PCP or specialist within 7 days of discharge? No   What is preventing the patient from scheduling follow up appointments within 7 days of discharge? Haven't had time   Nursing Interventions Advised patient to make appointment   Has the patient kept scheduled appointments due by today? N/A   Has home health visited the patient within 72 hours of discharge? N/A   Psychosocial issues? No   Did the patient receive a copy of their discharge instructions? Yes   Did the patient receive a copy of COVID-19 specific instructions? Yes   Nursing interventions Reviewed instructions with patient   What is the patient's perception of their health status since discharge? Improving  [Reports diarrhea still present but has slowed--he is following a liquid diet as advised and is aware to maintain hydration.  Patient denies fever and has no respiratory issue but aware to monitor.]   Does the patient have any of the following symptoms? None   Nursing Interventions Nurse provided patient education   Pulse Ox monitoring None   Is the patient/caregiver able to teach back steps to recovery at  home? Rest and rebuild strength, gradually increase activity   Is the patient/caregiver able to teach back the hierarchy of who to call/visit for symptoms/problems? PCP, Specialist, Home health nurse, Urgent Care, ED, 911 Yes   COVID-19 call completed? Yes          BHARATHI STRINGER - Registered Nurse

## 2022-07-30 ENCOUNTER — READMISSION MANAGEMENT (OUTPATIENT)
Dept: CALL CENTER | Facility: HOSPITAL | Age: 67
End: 2022-07-30

## 2022-07-30 NOTE — OUTREACH NOTE
COVID-19 Week 1 Survey    Flowsheet Row Responses   [facility] facility patient discharged from? [place]   Does the patient have one of the following disease processes/diagnoses(primary or secondary)? COVID-19   COVID-19 underlying condition? None   Call Number Call 2   Week 1 Call successful? No   Discharge diagnosis Diarrhea  COVID19 Detected            ANH CONTRERAS - Registered Nurse

## 2022-08-02 ENCOUNTER — HOSPITAL ENCOUNTER (OUTPATIENT)
Facility: HOSPITAL | Age: 67
Setting detail: OBSERVATION
LOS: 1 days | Discharge: HOME | End: 2022-08-04
Attending: EMERGENCY MEDICINE | Admitting: INTERNAL MEDICINE

## 2022-08-02 DIAGNOSIS — I95.89 HYPOTENSION DUE TO HYPOVOLEMIA: ICD-10-CM

## 2022-08-02 DIAGNOSIS — U07.1 COVID: ICD-10-CM

## 2022-08-02 DIAGNOSIS — A04.72 CLOSTRIDIOIDES DIFFICILE DIARRHEA: Primary | ICD-10-CM

## 2022-08-02 DIAGNOSIS — E86.1 HYPOTENSION DUE TO HYPOVOLEMIA: ICD-10-CM

## 2022-08-02 DIAGNOSIS — A07.4 INFECTION OF INTESTINE DUE TO CYCLOSPORA CAYETANENSIS: ICD-10-CM

## 2022-08-02 PROBLEM — B60.8: Status: ACTIVE | Noted: 2022-08-02

## 2022-08-02 PROBLEM — N17.9 AKI (ACUTE KIDNEY INJURY) (HCC): Status: ACTIVE | Noted: 2022-08-02

## 2022-08-02 PROBLEM — I95.9 HYPOTENSION: Status: ACTIVE | Noted: 2022-08-02

## 2022-08-02 LAB
ADV 40+41 DNA STL QL NAA+NON-PROBE: NOT DETECTED
ALBUMIN SERPL-MCNC: 4.6 G/DL (ref 3.5–5.2)
ALBUMIN/GLOB SERPL: 1.9 G/DL
ALP SERPL-CCNC: 93 U/L (ref 39–117)
ALT SERPL W P-5'-P-CCNC: 36 U/L (ref 1–41)
ANION GAP SERPL CALCULATED.3IONS-SCNC: 15.4 MMOL/L (ref 5–15)
AST SERPL-CCNC: 23 U/L (ref 1–40)
ASTRO TYP 1-8 RNA STL QL NAA+NON-PROBE: NOT DETECTED
BACTERIA UR QL AUTO: NORMAL /HPF
BASOPHILS # BLD AUTO: 0.07 10*3/MM3 (ref 0–0.2)
BASOPHILS NFR BLD AUTO: 0.8 % (ref 0–1.5)
BILIRUB SERPL-MCNC: 0.6 MG/DL (ref 0–1.2)
BILIRUB UR QL STRIP: ABNORMAL
BUN SERPL-MCNC: 40 MG/DL (ref 8–23)
BUN/CREAT SERPL: 21.6 (ref 7–25)
C CAYETANENSIS DNA STL QL NAA+NON-PROBE: DETECTED
C COLI+JEJ+UPSA DNA STL QL NAA+NON-PROBE: NOT DETECTED
C DIFF GDH + TOXINS A+B STL QL IA.RAPID: NEGATIVE
C DIFF TOX GENS STL QL NAA+PROBE: POSITIVE
CALCIUM SPEC-SCNC: 9.4 MG/DL (ref 8.6–10.5)
CHLORIDE SERPL-SCNC: 107 MMOL/L (ref 98–107)
CLARITY UR: CLEAR
CO2 SERPL-SCNC: 15.6 MMOL/L (ref 22–29)
COD CRY URNS QL: NORMAL /HPF
COLOR UR: ABNORMAL
CREAT SERPL-MCNC: 1.85 MG/DL (ref 0.76–1.27)
CRYPTOSP DNA STL QL NAA+NON-PROBE: NOT DETECTED
D-LACTATE SERPL-SCNC: 0.9 MMOL/L (ref 0.5–2)
DEPRECATED RDW RBC AUTO: 43.1 FL (ref 37–54)
E HISTOLYT DNA STL QL NAA+NON-PROBE: NOT DETECTED
EAEC PAA PLAS AGGR+AATA ST NAA+NON-PRB: NOT DETECTED
EC STX1+STX2 GENES STL QL NAA+NON-PROBE: NOT DETECTED
EGFRCR SERPLBLD CKD-EPI 2021: 39.2 ML/MIN/1.73
EOSINOPHIL # BLD AUTO: 0.24 10*3/MM3 (ref 0–0.4)
EOSINOPHIL NFR BLD AUTO: 2.8 % (ref 0.3–6.2)
EPEC EAE GENE STL QL NAA+NON-PROBE: NOT DETECTED
ERYTHROCYTE [DISTWIDTH] IN BLOOD BY AUTOMATED COUNT: 12.6 % (ref 12.3–15.4)
ETEC LTA+ST1A+ST1B TOX ST NAA+NON-PROBE: NOT DETECTED
G LAMBLIA DNA STL QL NAA+NON-PROBE: NOT DETECTED
GLOBULIN UR ELPH-MCNC: 2.4 GM/DL
GLUCOSE BLDC GLUCOMTR-MCNC: 110 MG/DL (ref 70–130)
GLUCOSE BLDC GLUCOMTR-MCNC: 132 MG/DL (ref 70–130)
GLUCOSE SERPL-MCNC: 149 MG/DL (ref 65–99)
GLUCOSE UR STRIP-MCNC: NEGATIVE MG/DL
HCT VFR BLD AUTO: 43.8 % (ref 37.5–51)
HGB BLD-MCNC: 14.5 G/DL (ref 13–17.7)
HGB UR QL STRIP.AUTO: NEGATIVE
HYALINE CASTS UR QL AUTO: NORMAL /LPF
IMM GRANULOCYTES # BLD AUTO: 0.12 10*3/MM3 (ref 0–0.05)
IMM GRANULOCYTES NFR BLD AUTO: 1.4 % (ref 0–0.5)
KETONES UR QL STRIP: ABNORMAL
LEUKOCYTE ESTERASE UR QL STRIP.AUTO: NEGATIVE
LIPASE SERPL-CCNC: 50 U/L (ref 13–60)
LYMPHOCYTES # BLD AUTO: 1.57 10*3/MM3 (ref 0.7–3.1)
LYMPHOCYTES NFR BLD AUTO: 18.2 % (ref 19.6–45.3)
MAGNESIUM SERPL-MCNC: 1.7 MG/DL (ref 1.6–2.4)
MCH RBC QN AUTO: 31 PG (ref 26.6–33)
MCHC RBC AUTO-ENTMCNC: 33.1 G/DL (ref 31.5–35.7)
MCV RBC AUTO: 93.6 FL (ref 79–97)
MONOCYTES # BLD AUTO: 1.24 10*3/MM3 (ref 0.1–0.9)
MONOCYTES NFR BLD AUTO: 14.3 % (ref 5–12)
NEUTROPHILS NFR BLD AUTO: 5.41 10*3/MM3 (ref 1.7–7)
NEUTROPHILS NFR BLD AUTO: 62.5 % (ref 42.7–76)
NITRITE UR QL STRIP: NEGATIVE
NOROVIRUS GI+II RNA STL QL NAA+NON-PROBE: NOT DETECTED
NRBC BLD AUTO-RTO: 0 /100 WBC (ref 0–0.2)
P SHIGELLOIDES DNA STL QL NAA+NON-PROBE: NOT DETECTED
PH UR STRIP.AUTO: <=5 [PH] (ref 5–8)
PLATELET # BLD AUTO: 315 10*3/MM3 (ref 140–450)
PMV BLD AUTO: 9.9 FL (ref 6–12)
POTASSIUM SERPL-SCNC: 4.6 MMOL/L (ref 3.5–5.2)
PROT SERPL-MCNC: 7 G/DL (ref 6–8.5)
PROT UR QL STRIP: ABNORMAL
RBC # BLD AUTO: 4.68 10*6/MM3 (ref 4.14–5.8)
RBC # UR STRIP: NORMAL /HPF
REF LAB TEST METHOD: NORMAL
RVA RNA STL QL NAA+NON-PROBE: NOT DETECTED
S ENT+BONG DNA STL QL NAA+NON-PROBE: NOT DETECTED
SAPO I+II+IV+V RNA STL QL NAA+NON-PROBE: NOT DETECTED
SARS-COV-2 ORF1AB RESP QL NAA+PROBE: DETECTED
SHIGELLA SP+EIEC IPAH ST NAA+NON-PROBE: NOT DETECTED
SODIUM SERPL-SCNC: 138 MMOL/L (ref 136–145)
SP GR UR STRIP: 1.03 (ref 1–1.03)
SQUAMOUS #/AREA URNS HPF: NORMAL /HPF
UROBILINOGEN UR QL STRIP: ABNORMAL
V CHOL+PARA+VUL DNA STL QL NAA+NON-PROBE: NOT DETECTED
V CHOLERAE DNA STL QL NAA+NON-PROBE: NOT DETECTED
WBC # UR STRIP: NORMAL /HPF
WBC NRBC COR # BLD: 8.65 10*3/MM3 (ref 3.4–10.8)
Y ENTEROCOL DNA STL QL NAA+NON-PROBE: NOT DETECTED

## 2022-08-02 PROCEDURE — 81001 URINALYSIS AUTO W/SCOPE: CPT | Performed by: PHYSICIAN ASSISTANT

## 2022-08-02 PROCEDURE — 83735 ASSAY OF MAGNESIUM: CPT | Performed by: PHYSICIAN ASSISTANT

## 2022-08-02 PROCEDURE — G0378 HOSPITAL OBSERVATION PER HR: HCPCS

## 2022-08-02 PROCEDURE — 87493 C DIFF AMPLIFIED PROBE: CPT | Performed by: PHYSICIAN ASSISTANT

## 2022-08-02 PROCEDURE — 83690 ASSAY OF LIPASE: CPT | Performed by: PHYSICIAN ASSISTANT

## 2022-08-02 PROCEDURE — 36415 COLL VENOUS BLD VENIPUNCTURE: CPT

## 2022-08-02 PROCEDURE — 96360 HYDRATION IV INFUSION INIT: CPT

## 2022-08-02 PROCEDURE — 82962 GLUCOSE BLOOD TEST: CPT

## 2022-08-02 PROCEDURE — 83605 ASSAY OF LACTIC ACID: CPT | Performed by: PHYSICIAN ASSISTANT

## 2022-08-02 PROCEDURE — 85025 COMPLETE CBC W/AUTO DIFF WBC: CPT | Performed by: PHYSICIAN ASSISTANT

## 2022-08-02 PROCEDURE — U0004 COV-19 TEST NON-CDC HGH THRU: HCPCS | Performed by: EMERGENCY MEDICINE

## 2022-08-02 PROCEDURE — 87507 IADNA-DNA/RNA PROBE TQ 12-25: CPT | Performed by: PHYSICIAN ASSISTANT

## 2022-08-02 PROCEDURE — 87449 NOS EACH ORGANISM AG IA: CPT | Performed by: PHYSICIAN ASSISTANT

## 2022-08-02 PROCEDURE — 99284 EMERGENCY DEPT VISIT MOD MDM: CPT

## 2022-08-02 PROCEDURE — 80053 COMPREHEN METABOLIC PANEL: CPT | Performed by: PHYSICIAN ASSISTANT

## 2022-08-02 PROCEDURE — C9803 HOPD COVID-19 SPEC COLLECT: HCPCS

## 2022-08-02 RX ORDER — AMLODIPINE BESYLATE 5 MG/1
5 TABLET ORAL
Status: DISCONTINUED | OUTPATIENT
Start: 2022-08-02 | End: 2022-08-04 | Stop reason: HOSPADM

## 2022-08-02 RX ORDER — BUDESONIDE AND FORMOTEROL FUMARATE DIHYDRATE 160; 4.5 UG/1; UG/1
2 AEROSOL RESPIRATORY (INHALATION)
Status: DISCONTINUED | OUTPATIENT
Start: 2022-08-02 | End: 2022-08-04 | Stop reason: HOSPADM

## 2022-08-02 RX ORDER — NITROGLYCERIN 0.4 MG/1
0.4 TABLET SUBLINGUAL
Status: DISCONTINUED | OUTPATIENT
Start: 2022-08-02 | End: 2022-08-04 | Stop reason: HOSPADM

## 2022-08-02 RX ORDER — SODIUM CHLORIDE 0.9 % (FLUSH) 0.9 %
10 SYRINGE (ML) INJECTION AS NEEDED
Status: DISCONTINUED | OUTPATIENT
Start: 2022-08-02 | End: 2022-08-04 | Stop reason: HOSPADM

## 2022-08-02 RX ORDER — ONDANSETRON 2 MG/ML
4 INJECTION INTRAMUSCULAR; INTRAVENOUS EVERY 6 HOURS PRN
Status: DISCONTINUED | OUTPATIENT
Start: 2022-08-02 | End: 2022-08-04 | Stop reason: HOSPADM

## 2022-08-02 RX ORDER — ACETAMINOPHEN 160 MG/5ML
650 SOLUTION ORAL EVERY 4 HOURS PRN
Status: DISCONTINUED | OUTPATIENT
Start: 2022-08-02 | End: 2022-08-04 | Stop reason: HOSPADM

## 2022-08-02 RX ORDER — VANCOMYCIN HYDROCHLORIDE 125 MG/1
500 CAPSULE ORAL EVERY 6 HOURS SCHEDULED
Status: DISCONTINUED | OUTPATIENT
Start: 2022-08-02 | End: 2022-08-02

## 2022-08-02 RX ORDER — ONDANSETRON 4 MG/1
4 TABLET, FILM COATED ORAL EVERY 6 HOURS PRN
Status: DISCONTINUED | OUTPATIENT
Start: 2022-08-02 | End: 2022-08-04 | Stop reason: HOSPADM

## 2022-08-02 RX ORDER — METRONIDAZOLE 500 MG/100ML
500 INJECTION, SOLUTION INTRAVENOUS EVERY 8 HOURS
Status: DISCONTINUED | OUTPATIENT
Start: 2022-08-03 | End: 2022-08-02

## 2022-08-02 RX ORDER — DEXTROSE MONOHYDRATE 25 G/50ML
25 INJECTION, SOLUTION INTRAVENOUS
Status: DISCONTINUED | OUTPATIENT
Start: 2022-08-02 | End: 2022-08-04 | Stop reason: HOSPADM

## 2022-08-02 RX ORDER — SULFAMETHOXAZOLE AND TRIMETHOPRIM 800; 160 MG/1; MG/1
1 TABLET ORAL EVERY 12 HOURS SCHEDULED
Status: DISCONTINUED | OUTPATIENT
Start: 2022-08-02 | End: 2022-08-04 | Stop reason: HOSPADM

## 2022-08-02 RX ORDER — ACETAMINOPHEN 650 MG/1
650 SUPPOSITORY RECTAL EVERY 4 HOURS PRN
Status: DISCONTINUED | OUTPATIENT
Start: 2022-08-02 | End: 2022-08-04 | Stop reason: HOSPADM

## 2022-08-02 RX ORDER — ACETAMINOPHEN 325 MG/1
650 TABLET ORAL EVERY 4 HOURS PRN
Status: DISCONTINUED | OUTPATIENT
Start: 2022-08-02 | End: 2022-08-04 | Stop reason: HOSPADM

## 2022-08-02 RX ORDER — VANCOMYCIN HYDROCHLORIDE 125 MG/1
125 CAPSULE ORAL EVERY 6 HOURS SCHEDULED
Status: DISCONTINUED | OUTPATIENT
Start: 2022-08-02 | End: 2022-08-04 | Stop reason: HOSPADM

## 2022-08-02 RX ORDER — CLONIDINE HYDROCHLORIDE 0.1 MG/1
0.3 TABLET ORAL 2 TIMES DAILY
Status: DISCONTINUED | OUTPATIENT
Start: 2022-08-02 | End: 2022-08-04 | Stop reason: HOSPADM

## 2022-08-02 RX ORDER — SODIUM CHLORIDE 9 MG/ML
125 INJECTION, SOLUTION INTRAVENOUS CONTINUOUS
Status: DISCONTINUED | OUTPATIENT
Start: 2022-08-02 | End: 2022-08-04 | Stop reason: HOSPADM

## 2022-08-02 RX ORDER — ALBUTEROL SULFATE 2.5 MG/3ML
2.5 SOLUTION RESPIRATORY (INHALATION) EVERY 6 HOURS PRN
Status: DISCONTINUED | OUTPATIENT
Start: 2022-08-02 | End: 2022-08-04 | Stop reason: HOSPADM

## 2022-08-02 RX ORDER — ATORVASTATIN CALCIUM 20 MG/1
40 TABLET, FILM COATED ORAL DAILY
Status: DISCONTINUED | OUTPATIENT
Start: 2022-08-02 | End: 2022-08-04 | Stop reason: HOSPADM

## 2022-08-02 RX ORDER — NICOTINE POLACRILEX 4 MG
15 LOZENGE BUCCAL
Status: DISCONTINUED | OUTPATIENT
Start: 2022-08-02 | End: 2022-08-04 | Stop reason: HOSPADM

## 2022-08-02 RX ORDER — SODIUM CHLORIDE 0.9 % (FLUSH) 0.9 %
10 SYRINGE (ML) INJECTION EVERY 12 HOURS SCHEDULED
Status: DISCONTINUED | OUTPATIENT
Start: 2022-08-02 | End: 2022-08-04 | Stop reason: HOSPADM

## 2022-08-02 RX ORDER — INSULIN LISPRO 100 [IU]/ML
0-7 INJECTION, SOLUTION INTRAVENOUS; SUBCUTANEOUS
Status: DISCONTINUED | OUTPATIENT
Start: 2022-08-02 | End: 2022-08-04 | Stop reason: HOSPADM

## 2022-08-02 RX ADMIN — VANCOMYCIN HYDROCHLORIDE 125 MG: 125 CAPSULE ORAL at 20:09

## 2022-08-02 RX ADMIN — SODIUM CHLORIDE 125 ML/HR: 9 INJECTION, SOLUTION INTRAVENOUS at 20:08

## 2022-08-02 RX ADMIN — CLONIDINE HYDROCHLORIDE 0.3 MG: 0.1 TABLET ORAL at 20:09

## 2022-08-02 RX ADMIN — Medication 10 ML: at 20:09

## 2022-08-02 RX ADMIN — SODIUM CHLORIDE 2382 ML: 9 INJECTION, SOLUTION INTRAVENOUS at 12:19

## 2022-08-02 RX ADMIN — VANCOMYCIN HYDROCHLORIDE 500 MG: 125 CAPSULE ORAL at 13:38

## 2022-08-02 RX ADMIN — SULFAMETHOXAZOLE AND TRIMETHOPRIM 1 TABLET: 800; 160 TABLET ORAL at 20:09

## 2022-08-02 RX ADMIN — SODIUM CHLORIDE 1000 ML: 9 INJECTION, SOLUTION INTRAVENOUS at 10:17

## 2022-08-02 NOTE — ED TRIAGE NOTES
Pt has had diarrhea since covid dx 10 days ago    Patient was placed in face mask during first look triage.  Patient was wearing a face mask throughout encounter.  I wore personal protective equipment throughout the encounter.  Hand hygiene was performed before and after patient encounter.

## 2022-08-02 NOTE — H&P
HISTORY AND PHYSICAL   [facility]        Patient Identification:  Name: Shankar Daley  Age: 67 y.o.  Sex: male  :  1955  MRN: [id number]                     Primary Care Physician: Brenda Erickson MD    Chief Complaint: Diarrhea.  Weakness.    History of Present Illness:   Pleasant 67-year-old gentleman who developed diarrhea after returning home from [place] approximately 2 and half weeks ago.  His wife also had diarrhea but she seemed to get over it pretty well.  This is persisted.  Initially was associated with nausea and vomiting and is having difficulty keeping food down.  The nausea and vomiting is markedly improved and his appetite is markedly improved also however the diarrhea is still profuse noting loose stools almost every 2 hours.  He describes his stools as being very loose and brown.  No blood or mucus in the stools.  No abdominal cramping.  He did start taking Pepto-Bismol and as expected his stools are now very dark.  No fever sweats or chills.  He has lost about 10 pounds in the last 2 weeks.  Last couple days he has been feeling increasingly weak with very poor exercise tolerance.  No recent history of antibiotic use.  No previous history of C. difficile colitis.      Past Medical History:  Past Medical History:   Diagnosis Date   • Asthma    • Diabetes mellitus (HCC)    • Diverticulitis    • Hyperlipidemia    • Hypertension      Past Surgical History:  Past Surgical History:   Procedure Laterality Date   • APPENDECTOMY     • COLONOSCOPY N/A 2016    Procedure: COLONOSCOPY INTO CECUM/ TERMINAL ILEUM;  Surgeon: Shad Zambrano MD;  Location: [facility] ENDOSCOPY;  Service:    • COLONOSCOPY N/A 3/7/2022    Procedure: COLONOSCOPY TO CECUM AND TO TI WITH COLD BIOPSIES;  Surgeon: Shad Zambrano MD;  Location: [facility] ENDOSCOPY;  Service: Gastroenterology;  Laterality: N/A;  pre: history of diverticulitis and diarrhea  post: diverticulosis and hemorrhoids      Home  Meds:  Medications Prior to Admission   Medication Sig Dispense Refill Last Dose   • albuterol (PROAIR HFA) 108 (90 Base) MCG/ACT inhaler Inhale 2 puffs Every 6 (Six) Hours As Needed for Wheezing. 5 inhaler 3    • amLODIPine-valsartan (EXFORGE)  MG per tablet Take 1 tablet by mouth Daily.   8/2/2022 at 0900   • atorvastatin (LIPITOR) 40 MG tablet TAKE 1 TABLET DAILY 90 tablet 1 8/2/2022 at 0900   • budesonide-formoterol (SYMBICORT) 160-4.5 MCG/ACT inhaler Inhale 2 puffs As Needed.   8/2/2022 at 0900   • cloNIDine (CATAPRES) 0.3 MG tablet Take 0.3 mg by mouth 2 (Two) Times a Day.   8/2/2022 at 0900   • ezetimibe (ZETIA) 10 MG tablet Take 10 mg by mouth Daily.   8/2/2022 at 0900   • metFORMIN (GLUCOPHAGE) 500 MG tablet TAKE 1 TABLET DAILY WITH BREAKFAST (Patient taking differently: Take 500 mg by mouth Daily. Taking 1000 mg in the morning and 500 mg in the evening) 90 tablet 1 8/2/2022 at 0900       Allergies:  Allergies   Allergen Reactions   • Hydrochlorothiazide Rash     Immunizations:  Immunization History   Administered Date(s) Administered   • Pneumococcal Conjugate 13-Valent (PCV13) 03/01/2017   • Tdap 03/01/2017     Social History:   Social History     Social History Narrative   • Not on file     Social History     Tobacco Use   • Smoking status: Never Smoker   • Smokeless tobacco: Never Used   Substance Use Topics   • Alcohol use: Yes     Alcohol/week: 105.0 standard drinks     Types: 105 Shots of liquor per week     Comment: has 2-3 5oz glasses of whiskey daily     Family History:  Family History   Problem Relation Age of Onset   • Hypertension Father    • Alzheimer's disease Mother    • Hypertension Brother    • Diabetes Brother    • Hypertension Brother         Review of Systems  Review of Systems   Constitutional:        As per history of present illness.   HENT: Negative.    Eyes: Negative.    Respiratory: Negative.    Cardiovascular: Negative.    Gastrointestinal:        As per history of present  illness.   Endocrine: Negative.    Genitourinary: Negative.    Musculoskeletal: Negative.    Skin: Negative.    Allergic/Immunologic: Negative.    Neurological: Negative.    Hematological: Negative.    Psychiatric/Behavioral: Negative.        Objective:  T Max 24 hrs: Temp (24hrs), Av.8 °F (36 °C), Min:96.4 °F (35.8 °C), Max:97.6 °F (36.4 °C)    Vitals Ranges:   Temp:  [96.4 °F (35.8 °C)-97.6 °F (36.4 °C)] 96.5 °F (35.8 °C)  Heart Rate:  [] 68  Resp:  [15-16] 16  BP: ()/(38-61) 122/61      Exam:  Physical Exam  Constitutional:       Appearance: Normal appearance. He is normal weight.   HENT:      Head: Normocephalic and atraumatic.      Right Ear: External ear normal.      Left Ear: External ear normal.      Nose: Nose normal.      Mouth/Throat:      Mouth: Mucous membranes are moist.   Eyes:      General: No scleral icterus.        Right eye: No discharge.         Left eye: No discharge.      Extraocular Movements: Extraocular movements intact.      Conjunctiva/sclera: Conjunctivae normal.   Cardiovascular:      Rate and Rhythm: Normal rate and regular rhythm.      Pulses: Normal pulses.      Heart sounds: Normal heart sounds.   Pulmonary:      Effort: Pulmonary effort is normal.      Breath sounds: Normal breath sounds.   Abdominal:      General: Abdomen is flat. Bowel sounds are normal. There is no distension.      Palpations: Abdomen is soft. There is no mass.      Tenderness: There is no abdominal tenderness. There is no guarding or rebound.   Musculoskeletal:      Cervical back: Neck supple.      Right lower leg: No edema.      Left lower leg: No edema.   Skin:     General: Skin is warm and dry.   Neurological:      General: No focal deficit present.      Mental Status: He is alert and oriented to person, place, and time.   Psychiatric:         Mood and Affect: Mood normal.         Behavior: Behavior normal.         Thought Content: Thought content normal.         Judgment: Judgment normal.          Data Review:  All labs and radiology reviewed.    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  We will go ahead and start trimethoprim sulfa.  Monitor response.    Clostridioides difficile diarrhea: Present symptoms more consistent with Cyclospora.  Possible that C. difficile is a colonization however he is certainly at risk for exacerbation with oral antibiotics given for the Cyclospora.  We will initiate oral vancomycin.    MAXIM (acute kidney injury): Secondary to dehydration.  IV hydration with normal saline.  Hold metformin and ACE inhibitor's.  Monitor closely.    Hypotension: Improved.  Continue hydration.  Adjust blood pressure medications as needed.    DM II (diabetes mellitus, type II), controlled: Monitor closely with sliding scale insulin.    Recent history of COVID-19 positive: Presently with no clinical disease.    Asthma: As needed bronchodilators.      Plan:  Please see above.    COVID PPE worn during encounter.  Hands sanitized before and after.    Moreno Shen MD  8/2/2022  17:56 EDT    EMR Dragon/Transcription disclaimer:   Much of this encounter note is an electronic transcription/translation of spoken language to printed text. The electronic translation of spoken language may permit erroneous, or at times, nonsensical words or phrases to be inadvertently transcribed; Although I have reviewed the note for such errors, some may still exist.

## 2022-08-02 NOTE — ED NOTES
Pt to ED room 18. A&Ox4. Pt reports diarrhea x 10 days. He reports that he was recently seen for vomiting but that seems to have resolved and now he is having diarrhea. Pt reports that he was diagnosed with COVID 2 weeks ago. He denies abdominal pain, nausea, vomiting, chest pain and soa at this time. He reports that he is having diarrhea approx q2hr. He denies any issues with urination. No other complaints at this time. Pt has tried taking pepto bismol at home without relief.     I was wearing appropriate PPE during assessment, pt wearing mask.

## 2022-08-02 NOTE — ED PROVIDER NOTES
MD ATTESTATION NOTE    The CULLEN and I have discussed this patient's history, physical exam, and treatment plan.  I have reviewed the documentation and personally had a face to face interaction with the patient. I affirm the documentation and agree with the treatment and plan.  The attached note describes my personal findings.    I provided a substantive portion of the care of this patient. I personally performed the physical exam, in its entirety.    Shankar Daley is a 67 y.o. male who presents to the ED c/o having diarrhea for the last 10 days.  He reports that he was diagnosed with COVID 10 days ago.  He reports that he has had nausea, vomiting, diarrhea since then.  He states he was vaccinated and had 1 booster.  He denies any abdominal pain.  He reports he has had fairly persistent diarrhea.  He has not had any treatment for his symptoms.  Nothing makes it worse or better.      On exam:  GENERAL: Awake, alert, no acute distress  SKIN: Warm, dry  HENT: Normocephalic, atraumatic  EYES: no scleral icterus  CV: regular rhythm, regular rate  RESPIRATORY: normal effort, lungs clear  ABDOMEN: soft, nontender, nondistended  MUSCULOSKELETAL: no deformity  NEURO: alert, moves all extremities, follows commands    Labs  Recent Results (from the past 24 hour(s))   Comprehensive Metabolic Panel    Collection Time: 08/02/22 10:14 AM    Specimen: Blood   Result Value Ref Range    Glucose 149 (H) 65 - 99 mg/dL    BUN 40 (H) 8 - 23 mg/dL    Creatinine 1.85 (H) 0.76 - 1.27 mg/dL    Sodium 138 136 - 145 mmol/L    Potassium 4.6 3.5 - 5.2 mmol/L    Chloride 107 98 - 107 mmol/L    CO2 15.6 (L) 22.0 - 29.0 mmol/L    Calcium 9.4 8.6 - 10.5 mg/dL    Total Protein 7.0 6.0 - 8.5 g/dL    Albumin 4.60 3.50 - 5.20 g/dL    ALT (SGPT) 36 1 - 41 U/L    AST (SGOT) 23 1 - 40 U/L    Alkaline Phosphatase 93 39 - 117 U/L    Total Bilirubin 0.6 0.0 - 1.2 mg/dL    Globulin 2.4 gm/dL    A/G Ratio 1.9 g/dL    BUN/Creatinine Ratio 21.6 7.0 - 25.0    Anion  Gap 15.4 (H) 5.0 - 15.0 mmol/L    eGFR 39.2 (L) >60.0 mL/min/1.73   Lipase    Collection Time: 08/02/22 10:14 AM    Specimen: Blood   Result Value Ref Range    Lipase 50 13 - 60 U/L   CBC Auto Differential    Collection Time: 08/02/22 10:14 AM    Specimen: Blood   Result Value Ref Range    WBC 8.65 3.40 - 10.80 10*3/mm3    RBC 4.68 4.14 - 5.80 10*6/mm3    Hemoglobin 14.5 13.0 - 17.7 g/dL    Hematocrit 43.8 37.5 - 51.0 %    MCV 93.6 79.0 - 97.0 fL    MCH 31.0 26.6 - 33.0 pg    MCHC 33.1 31.5 - 35.7 g/dL    RDW 12.6 12.3 - 15.4 %    RDW-SD 43.1 37.0 - 54.0 fl    MPV 9.9 6.0 - 12.0 fL    Platelets 315 140 - 450 10*3/mm3    Neutrophil % 62.5 42.7 - 76.0 %    Lymphocyte % 18.2 (L) 19.6 - 45.3 %    Monocyte % 14.3 (H) 5.0 - 12.0 %    Eosinophil % 2.8 0.3 - 6.2 %    Basophil % 0.8 0.0 - 1.5 %    Immature Grans % 1.4 (H) 0.0 - 0.5 %    Neutrophils, Absolute 5.41 1.70 - 7.00 10*3/mm3    Lymphocytes, Absolute 1.57 0.70 - 3.10 10*3/mm3    Monocytes, Absolute 1.24 (H) 0.10 - 0.90 10*3/mm3    Eosinophils, Absolute 0.24 0.00 - 0.40 10*3/mm3    Basophils, Absolute 0.07 0.00 - 0.20 10*3/mm3    Immature Grans, Absolute 0.12 (H) 0.00 - 0.05 10*3/mm3    nRBC 0.0 0.0 - 0.2 /100 WBC   Magnesium    Collection Time: 08/02/22 10:14 AM    Specimen: Blood   Result Value Ref Range    Magnesium 1.7 1.6 - 2.4 mg/dL   Lactic Acid, Plasma    Collection Time: 08/02/22 10:55 AM    Specimen: Blood   Result Value Ref Range    Lactate 0.9 0.5 - 2.0 mmol/L   Urinalysis With Microscopic If Indicated (No Culture) - Urine, Clean Catch    Collection Time: 08/02/22 11:43 AM    Specimen: Urine, Clean Catch   Result Value Ref Range    Color, UA Dark Yellow (A) Yellow, Straw    Appearance, UA Clear Clear    pH, UA <=5.0 5.0 - 8.0    Specific Gravity, UA 1.027 1.005 - 1.030    Glucose, UA Negative Negative    Ketones, UA Trace (A) Negative    Bilirubin, UA Small (1+) (A) Negative    Blood, UA Negative Negative    Protein,  mg/dL (2+) (A) Negative    Leuk  Esterase, UA Negative Negative    Nitrite, UA Negative Negative    Urobilinogen, UA 0.2 E.U./dL 0.2 - 1.0 E.U./dL   Gastrointestinal Panel, PCR - Stool, Per Rectum    Collection Time: 08/02/22 11:43 AM    Specimen: Per Rectum; Stool   Result Value Ref Range    Campylobacter Not Detected Not Detected    Plesiomonas shigelloides Not Detected Not Detected    Salmonella Not Detected Not Detected    Vibrio Not Detected Not Detected    Vibrio cholerae Not Detected Not Detected    Yersinia enterocolitica Not Detected Not Detected    Enteroaggregative E. coli (EAEC) Not Detected Not Detected    Enteropathogenic E. coli (EPEC) Not Detected Not Detected    Enterotoxigenic E. coli (ETEC) lt/st Not Detected Not Detected    Shiga-like toxin-producing E. coli (STEC) stx1/stx2 Not Detected Not Detected    Shigella/Enteroinvasive E. coli (EIEC) Not Detected Not Detected    Cryptosporidium Not Detected Not Detected    Cyclospora cayetanensis Detected (A) Not Detected    Entamoeba histolytica Not Detected Not Detected    Giardia lamblia Not Detected Not Detected    Adenovirus F40/41 Not Detected Not Detected    Astrovirus Not Detected Not Detected    Norovirus GI/GII Not Detected Not Detected    Rotavirus A Not Detected Not Detected    Sapovirus (I, II, IV or V) Not Detected Not Detected   Clostridioides difficile Toxin, PCR - Stool, Per Rectum    Collection Time: 08/02/22 11:43 AM    Specimen: Per Rectum; Stool   Result Value Ref Range    C. Difficile Toxins by PCR Positive (A) Negative   Urinalysis, Microscopic Only - Urine, Clean Catch    Collection Time: 08/02/22 11:43 AM    Specimen: Urine, Clean Catch   Result Value Ref Range    RBC, UA None Seen None Seen, 0-2 /HPF    WBC, UA None Seen None Seen, 0-2 /HPF    Bacteria, UA None Seen None Seen /HPF    Squamous Epithelial Cells, UA 0-2 None Seen, 0-2 /HPF    Hyaline Casts, UA 7-12 None Seen /LPF    Calcium Oxalate Crystals, UA Small/1+ None Seen /HPF    Methodology Manual Light  Microscopy    Clostridioides difficile toxin, Reflex - Stool, Per Rectum    Collection Time: 08/02/22 11:43 AM    Specimen: Per Rectum; Stool   Result Value Ref Range    CDIFF TOXIN Negative Negative       Radiology  No Radiology Exams Resulted Within Past 24 Hours    Medical Decision Making:  ED Course as of 08/02/22 1401   Tue Aug 02, 2022   1033 Patient presents with continued diarrhea for the past 2 weeks.  Patient tested positive for COVID approximately 2 weeks ago.  He denies any fever, vomiting, chest pain, shortness of breath, abdominal pain.  Plan to check basic labs.  Patient was also in [place] prior to this.  We will check a diarrhea panel as well. [EE]   1050 BP(!): 82/43  Lactic acid added [EE]   1057 WBC: 8.65 [EE]   1057 Hemoglobin: 14.5 [EE]   1109 Creatinine(!): 1.85 [EE]   1124 CO2(!): 15.6 [EE]   1124 Anion Gap(!): 15.4 [EE]   1213 Lactate: 0.9 [TR]   1304 Blood pressure 125/73.  Call placed to [facility] for admission. [TR]   1305 Clostridium difficile (toxin A/B)(!): Positive [TR]   1305 I spoke with the clinical pharmacist to initiate C. difficile treatment. [TR]   1337 Cyclospora cayetanensis(!): Detected [TR]   1356 CDIFF TOXIN: Negative [TR]   1357 Speaking with Dr. Shen with [TR]   1357 A.  Will admit to telemetry bed. [TR]      ED Course User Index  [EE] Jesu Moscoso PA  [TR] Cameron Santamaria MD       Procedures:  Critical Care  Performed by: Cameron Santamaria MD  Authorized by: Cameron Santamaria MD     Critical care provider statement:     Critical care time (minutes): 30-74.    Critical care time was exclusive of:  Separately billable procedures and treating other patients    Critical care was necessary to treat or prevent imminent or life-threatening deterioration of the following conditions:  Circulatory failure    Critical care was time spent personally by me on the following activities:  Development of treatment plan with patient or surrogate, discussions with consultants, evaluation  of patient's response to treatment, examination of patient, obtaining history from patient or surrogate, ordering and performing treatments and interventions, ordering and review of laboratory studies, pulse oximetry, re-evaluation of patient's condition and review of old charts        The patient has hypotension.  This is likely related to dehydration.  Plan to start IV fluids.  We will check stool studies, chemistries, blood counts, lactic acid, gastrointestinal PCR's.    PPE: The patient wore a mask and I wore an N95 mask throughout the entire patient encounter.  I wore gown.  I wore goggles.    The patient has started, but not completed, their COVID-19 vaccination series.    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

## 2022-08-02 NOTE — ED PROVIDER NOTES
EMERGENCY DEPARTMENT ENCOUNTER    Room Number:  18/18  Date of encounter:  8/2/2022  PCP: Brenda Erickson MD  Historian: Patient      I used full protective equipment while examining this patient.  This includes face mask, gloves and protective eyewear.  I washed my hands before entering the room and immediately upon leaving the room      HPI:  Chief Complaint: Diarrhea  A complete HPI/ROS/PMH/PSH/SH/FH are unobtainable due to: Nothing    Context: Shankar Daley is a 67 y.o. male who presents to the ED c/o approximately 10 days worth of diarrhea.  Patient states approximately 2 weeks ago he tested positive for COVID after having abdominal pain, nausea, vomiting, diarrhea.  He states his vomiting and abdominal pain have resolved.  He still has diarrhea approximately every 2 hours.  He states the diarrhea is watery.  Denies any fevers or chills.  He does complain of mild weakness.  Patient was actually seen in the ER for diarrhea on 7/26/2022.  He was admitted overnight.  He had a normal CT scan at that time.  Patient had just returned from [place] when his symptoms started.  He was there approximately 2.5 weeks ago.  He states his wife had similar symptoms and also tested positive for COVID.  Her symptoms have resolved.    Review of Medical Records  I reviewed admission from 7/27/2022.  Patient had a negative CT scan.  He did have a mild MAXIM.    PAST MEDICAL HISTORY  Active Ambulatory Problems     Diagnosis Date Noted   • Airway hyperreactivity 02/29/2016   • Bronchitis 02/29/2016   • Cervical radiculitis 02/29/2016   • Cervical pain 02/29/2016   • CD (contact dermatitis) 02/29/2016   • DDD (degenerative disc disease), cervical 02/29/2016   • Diabetes (HCC) 02/29/2016   • Non-insulin dependent type 2 diabetes mellitus (HCC) 02/29/2016   • DD (diverticular disease) 02/29/2016   • BP (high blood pressure) 02/29/2016   • HLD (hyperlipidemia) 02/29/2016   • Adverse effect of motion 02/29/2016   • Allergic dermatitis due to  poison ivy 02/29/2016   • Borderline diabetes 02/29/2016   • Eczema 02/29/2016   • Encounter for screening for malignant neoplasm of colon 02/29/2016   • Diverticulitis of large intestine 04/20/2016   • Health care maintenance 08/29/2016   • Hydrochlorothiazide adverse reaction 11/17/2017   • Diarrhea 07/26/2022     Resolved Ambulatory Problems     Diagnosis Date Noted   • No Resolved Ambulatory Problems     Past Medical History:   Diagnosis Date   • Asthma    • Diabetes mellitus (HCC)    • Diverticulitis    • Hyperlipidemia    • Hypertension          PAST SURGICAL HISTORY  Past Surgical History:   Procedure Laterality Date   • APPENDECTOMY     • COLONOSCOPY N/A 12/23/2016    Procedure: COLONOSCOPY INTO CECUM/ TERMINAL ILEUM;  Surgeon: Shad Zambrano MD;  Location: [facility] ENDOSCOPY;  Service:    • COLONOSCOPY N/A 3/7/2022    Procedure: COLONOSCOPY TO CECUM AND TO TI WITH COLD BIOPSIES;  Surgeon: Shad Zambrano MD;  Location: [facility] ENDOSCOPY;  Service: Gastroenterology;  Laterality: N/A;  pre: history of diverticulitis and diarrhea  post: diverticulosis and hemorrhoids         FAMILY HISTORY  Family History   Problem Relation Age of Onset   • Hypertension Father    • Alzheimer's disease Mother    • Hypertension Brother    • Diabetes Brother    • Hypertension Brother          SOCIAL HISTORY  Social History     Socioeconomic History   • Marital status:    Tobacco Use   • Smoking status: Never Smoker   • Smokeless tobacco: Never Used   Vaping Use   • Vaping Use: Never used   Substance and Sexual Activity   • Alcohol use: Yes     Alcohol/week: 105.0 standard drinks     Types: 105 Shots of liquor per week     Comment: has 2-3 5oz glasses of whiskey daily   • Drug use: No   • Sexual activity: Yes     Partners: Female         ALLERGIES  Hydrochlorothiazide        REVIEW OF SYSTEMS  All systems reviewed and negative except for those discussed in HPI.       PHYSICAL EXAM    I have reviewed the triage vital  signs and nursing notes.    ED Triage Vitals   Temp Heart Rate Resp BP SpO2   08/02/22 0917 08/02/22 0920 08/02/22 0917 08/02/22 0920 08/02/22 0920   96.4 °F (35.8 °C) 105 16 108/56 99 %      Temp src Heart Rate Source Patient Position BP Location FiO2 (%)   08/02/22 0917 08/02/22 0920 -- -- --   Tympanic Monitor          Physical Exam  GENERAL: Alert, oriented, nontoxic-appearing, not distressed  HENT: head atraumatic, no nuchal rigidity  EYES: no scleral icterus, EOMI  CV: regular rhythm, regular rate, no murmur  RESPIRATORY: normal effort, CTA  ABDOMEN: soft, nontender  MUSCULOSKELETAL: no deformity, FROM, no calf swelling or tenderness  NEURO: alert, moves all extremities, follows commands  SKIN: warm, dry        LAB RESULTS  Recent Results (from the past 24 hour(s))   Comprehensive Metabolic Panel    Collection Time: 08/02/22 10:14 AM    Specimen: Blood   Result Value Ref Range    Glucose 149 (H) 65 - 99 mg/dL    BUN 40 (H) 8 - 23 mg/dL    Creatinine 1.85 (H) 0.76 - 1.27 mg/dL    Sodium 138 136 - 145 mmol/L    Potassium 4.6 3.5 - 5.2 mmol/L    Chloride 107 98 - 107 mmol/L    CO2 15.6 (L) 22.0 - 29.0 mmol/L    Calcium 9.4 8.6 - 10.5 mg/dL    Total Protein 7.0 6.0 - 8.5 g/dL    Albumin 4.60 3.50 - 5.20 g/dL    ALT (SGPT) 36 1 - 41 U/L    AST (SGOT) 23 1 - 40 U/L    Alkaline Phosphatase 93 39 - 117 U/L    Total Bilirubin 0.6 0.0 - 1.2 mg/dL    Globulin 2.4 gm/dL    A/G Ratio 1.9 g/dL    BUN/Creatinine Ratio 21.6 7.0 - 25.0    Anion Gap 15.4 (H) 5.0 - 15.0 mmol/L    eGFR 39.2 (L) >60.0 mL/min/1.73   Lipase    Collection Time: 08/02/22 10:14 AM    Specimen: Blood   Result Value Ref Range    Lipase 50 13 - 60 U/L   CBC Auto Differential    Collection Time: 08/02/22 10:14 AM    Specimen: Blood   Result Value Ref Range    WBC 8.65 3.40 - 10.80 10*3/mm3    RBC 4.68 4.14 - 5.80 10*6/mm3    Hemoglobin 14.5 13.0 - 17.7 g/dL    Hematocrit 43.8 37.5 - 51.0 %    MCV 93.6 79.0 - 97.0 fL    MCH 31.0 26.6 - 33.0 pg    MCHC 33.1  31.5 - 35.7 g/dL    RDW 12.6 12.3 - 15.4 %    RDW-SD 43.1 37.0 - 54.0 fl    MPV 9.9 6.0 - 12.0 fL    Platelets 315 140 - 450 10*3/mm3    Neutrophil % 62.5 42.7 - 76.0 %    Lymphocyte % 18.2 (L) 19.6 - 45.3 %    Monocyte % 14.3 (H) 5.0 - 12.0 %    Eosinophil % 2.8 0.3 - 6.2 %    Basophil % 0.8 0.0 - 1.5 %    Immature Grans % 1.4 (H) 0.0 - 0.5 %    Neutrophils, Absolute 5.41 1.70 - 7.00 10*3/mm3    Lymphocytes, Absolute 1.57 0.70 - 3.10 10*3/mm3    Monocytes, Absolute 1.24 (H) 0.10 - 0.90 10*3/mm3    Eosinophils, Absolute 0.24 0.00 - 0.40 10*3/mm3    Basophils, Absolute 0.07 0.00 - 0.20 10*3/mm3    Immature Grans, Absolute 0.12 (H) 0.00 - 0.05 10*3/mm3    nRBC 0.0 0.0 - 0.2 /100 WBC   Magnesium    Collection Time: 08/02/22 10:14 AM    Specimen: Blood   Result Value Ref Range    Magnesium 1.7 1.6 - 2.4 mg/dL   Lactic Acid, Plasma    Collection Time: 08/02/22 10:55 AM    Specimen: Blood   Result Value Ref Range    Lactate 0.9 0.5 - 2.0 mmol/L   Urinalysis With Microscopic If Indicated (No Culture) - Urine, Clean Catch    Collection Time: 08/02/22 11:43 AM    Specimen: Urine, Clean Catch   Result Value Ref Range    Color, UA Dark Yellow (A) Yellow, Straw    Appearance, UA Clear Clear    pH, UA <=5.0 5.0 - 8.0    Specific Gravity, UA 1.027 1.005 - 1.030    Glucose, UA Negative Negative    Ketones, UA Trace (A) Negative    Bilirubin, UA Small (1+) (A) Negative    Blood, UA Negative Negative    Protein,  mg/dL (2+) (A) Negative    Leuk Esterase, UA Negative Negative    Nitrite, UA Negative Negative    Urobilinogen, UA 0.2 E.U./dL 0.2 - 1.0 E.U./dL   Clostridioides difficile Toxin, PCR - Stool, Per Rectum    Collection Time: 08/02/22 11:43 AM    Specimen: Per Rectum; Stool   Result Value Ref Range    C. Difficile Toxins by PCR Positive (A) Negative   Urinalysis, Microscopic Only - Urine, Clean Catch    Collection Time: 08/02/22 11:43 AM    Specimen: Urine, Clean Catch   Result Value Ref Range    RBC, UA None Seen None  Seen, 0-2 /HPF    WBC, UA None Seen None Seen, 0-2 /HPF    Bacteria, UA None Seen None Seen /HPF    Squamous Epithelial Cells, UA 0-2 None Seen, 0-2 /HPF    Hyaline Casts, UA 7-12 None Seen /LPF    Calcium Oxalate Crystals, UA Small/1+ None Seen /HPF    Methodology Manual Light Microscopy        Ordered the above labs and independently reviewed the results.      MEDICATIONS GIVEN IN ER    Medications   sodium chloride 0.9 % flush 10 mL (has no administration in time range)   sodium chloride 0.9 % bolus 2,382 mL (2,382 mL Intravenous New Bag 8/2/22 1219)   sodium chloride 0.9 % bolus 1,000 mL (0 mL Intravenous Stopped 8/2/22 1132)         PROGRESS, DATA ANALYSIS, CONSULTS, AND MEDICAL DECISION MAKING    All labs have been independently reviewed by me.  All radiology studies have been reviewed by me and discussed with radiologist dictating the report.   EKG's independently viewed and interpreted by me.  Discussion below represents my analysis of pertinent findings related to patient's condition, differential diagnosis, treatment plan and final disposition.    I have discussed case with Dr. Santamaria, emergency room physician.  He has performed his own bedside examination and agrees with treatment plan.    ED Course as of 08/02/22 1306   Tue Aug 02, 2022   1033 Patient presents with continued diarrhea for the past 2 weeks.  Patient tested positive for COVID approximately 2 weeks ago.  He denies any fever, vomiting, chest pain, shortness of breath, abdominal pain.  Plan to check basic labs.  Patient was also in [place] prior to this.  We will check a diarrhea panel as well. [EE]   1050 BP(!): 82/43  Lactic acid added [EE]   1057 WBC: 8.65 [EE]   1057 Hemoglobin: 14.5 [EE]   1109 Creatinine(!): 1.85 [EE]   1124 CO2(!): 15.6 [EE]   1124 Anion Gap(!): 15.4 [EE]   1213 Lactate: 0.9 [TR]   1304 Blood pressure 125/73.  Call placed to [facility] for admission. [TR]   1305 Clostridium difficile (toxin A/B)(!): Positive [TR]   1305 I  spoke with the clinical pharmacist to initiate C. difficile treatment. [TR]      ED Course User Index  [EE] Jesu Moscoso PA  [TR] Cameron Santamaria MD       AS OF 13:06 EDT VITALS:    BP - (!) 84/40  HR - 62  TEMP - 96.4 °F (35.8 °C) (Tympanic)  O2 SATS - 98%        DIAGNOSIS  Final diagnoses:   Clostridioides difficile diarrhea   Hypotension due to hypovolemia         DISPOSITION  Admitted      Dictated utilizing Dragon dictation     Jesu Moscoso PA  08/02/22 [id number]

## 2022-08-03 ENCOUNTER — READMISSION MANAGEMENT (OUTPATIENT)
Dept: CALL CENTER | Facility: HOSPITAL | Age: 67
End: 2022-08-03

## 2022-08-03 LAB
ANION GAP SERPL CALCULATED.3IONS-SCNC: 11 MMOL/L (ref 5–15)
BASOPHILS # BLD AUTO: 0.06 10*3/MM3 (ref 0–0.2)
BASOPHILS NFR BLD AUTO: 0.9 % (ref 0–1.5)
BUN SERPL-MCNC: 19 MG/DL (ref 8–23)
BUN/CREAT SERPL: 21.8 (ref 7–25)
CALCIUM SPEC-SCNC: 8.2 MG/DL (ref 8.6–10.5)
CHLORIDE SERPL-SCNC: 112 MMOL/L (ref 98–107)
CO2 SERPL-SCNC: 15 MMOL/L (ref 22–29)
CREAT SERPL-MCNC: 0.87 MG/DL (ref 0.76–1.27)
DEPRECATED RDW RBC AUTO: 43.3 FL (ref 37–54)
EGFRCR SERPLBLD CKD-EPI 2021: 94 ML/MIN/1.73
EOSINOPHIL # BLD AUTO: 0.25 10*3/MM3 (ref 0–0.4)
EOSINOPHIL NFR BLD AUTO: 3.8 % (ref 0.3–6.2)
ERYTHROCYTE [DISTWIDTH] IN BLOOD BY AUTOMATED COUNT: 12.5 % (ref 12.3–15.4)
GLUCOSE BLDC GLUCOMTR-MCNC: 115 MG/DL (ref 70–130)
GLUCOSE BLDC GLUCOMTR-MCNC: 116 MG/DL (ref 70–130)
GLUCOSE BLDC GLUCOMTR-MCNC: 139 MG/DL (ref 70–130)
GLUCOSE BLDC GLUCOMTR-MCNC: 149 MG/DL (ref 70–130)
GLUCOSE SERPL-MCNC: 116 MG/DL (ref 65–99)
HBA1C MFR BLD: 6.3 % (ref 4.8–5.6)
HCT VFR BLD AUTO: 36.5 % (ref 37.5–51)
HGB BLD-MCNC: 12.1 G/DL (ref 13–17.7)
IMM GRANULOCYTES # BLD AUTO: 0.11 10*3/MM3 (ref 0–0.05)
IMM GRANULOCYTES NFR BLD AUTO: 1.7 % (ref 0–0.5)
LYMPHOCYTES # BLD AUTO: 1.17 10*3/MM3 (ref 0.7–3.1)
LYMPHOCYTES NFR BLD AUTO: 17.9 % (ref 19.6–45.3)
MCH RBC QN AUTO: 31.2 PG (ref 26.6–33)
MCHC RBC AUTO-ENTMCNC: 33.2 G/DL (ref 31.5–35.7)
MCV RBC AUTO: 94.1 FL (ref 79–97)
MONOCYTES # BLD AUTO: 1.04 10*3/MM3 (ref 0.1–0.9)
MONOCYTES NFR BLD AUTO: 15.9 % (ref 5–12)
NEUTROPHILS NFR BLD AUTO: 3.92 10*3/MM3 (ref 1.7–7)
NEUTROPHILS NFR BLD AUTO: 59.8 % (ref 42.7–76)
NRBC BLD AUTO-RTO: 0 /100 WBC (ref 0–0.2)
PLATELET # BLD AUTO: 229 10*3/MM3 (ref 140–450)
PMV BLD AUTO: 10.1 FL (ref 6–12)
POTASSIUM SERPL-SCNC: 4.7 MMOL/L (ref 3.5–5.2)
RBC # BLD AUTO: 3.88 10*6/MM3 (ref 4.14–5.8)
SODIUM SERPL-SCNC: 138 MMOL/L (ref 136–145)
WBC NRBC COR # BLD: 6.55 10*3/MM3 (ref 3.4–10.8)

## 2022-08-03 PROCEDURE — G0378 HOSPITAL OBSERVATION PER HR: HCPCS

## 2022-08-03 PROCEDURE — 85025 COMPLETE CBC W/AUTO DIFF WBC: CPT | Performed by: HOSPITALIST

## 2022-08-03 PROCEDURE — 80048 BASIC METABOLIC PNL TOTAL CA: CPT | Performed by: HOSPITALIST

## 2022-08-03 PROCEDURE — 83036 HEMOGLOBIN GLYCOSYLATED A1C: CPT | Performed by: HOSPITALIST

## 2022-08-03 PROCEDURE — 82962 GLUCOSE BLOOD TEST: CPT

## 2022-08-03 RX ADMIN — VANCOMYCIN HYDROCHLORIDE 125 MG: 125 CAPSULE ORAL at 06:29

## 2022-08-03 RX ADMIN — CLONIDINE HYDROCHLORIDE 0.3 MG: 0.1 TABLET ORAL at 20:06

## 2022-08-03 RX ADMIN — VANCOMYCIN HYDROCHLORIDE 125 MG: 125 CAPSULE ORAL at 18:36

## 2022-08-03 RX ADMIN — CLONIDINE HYDROCHLORIDE 0.3 MG: 0.1 TABLET ORAL at 08:36

## 2022-08-03 RX ADMIN — VANCOMYCIN HYDROCHLORIDE 125 MG: 125 CAPSULE ORAL at 00:12

## 2022-08-03 RX ADMIN — ATORVASTATIN CALCIUM 40 MG: 20 TABLET, FILM COATED ORAL at 08:36

## 2022-08-03 RX ADMIN — VANCOMYCIN HYDROCHLORIDE 125 MG: 125 CAPSULE ORAL at 13:30

## 2022-08-03 RX ADMIN — AMLODIPINE BESYLATE 5 MG: 5 TABLET ORAL at 08:36

## 2022-08-03 RX ADMIN — SULFAMETHOXAZOLE AND TRIMETHOPRIM 1 TABLET: 800; 160 TABLET ORAL at 20:06

## 2022-08-03 RX ADMIN — SODIUM CHLORIDE 125 ML/HR: 9 INJECTION, SOLUTION INTRAVENOUS at 12:35

## 2022-08-03 RX ADMIN — SULFAMETHOXAZOLE AND TRIMETHOPRIM 1 TABLET: 800; 160 TABLET ORAL at 08:36

## 2022-08-03 NOTE — PROGRESS NOTES
[facility] Clinical Pharmacy Services: C. Difficile Medication Changes       Pharmacy has been consulted to look over Shankar Daley's profile to check patient's medications for changes due to C. Difficile diagnosis per Dr. Shen's request.       Current C. Diff Regimen: vanc 125 q6    Assessment/Plan/Changes       1. Proton pump inhibitor:   no  2. Antiperistalic agents or stool softeners/laxatives: no       Thank you for allowing me to participate in your patient's care.  Please call pharmacy with any questions or concerns.     Grecia Felipe RPH  Clinical Pharmacist

## 2022-08-03 NOTE — CASE MANAGEMENT/SOCIAL WORK
Discharge Planning Assessment  [facility]     Patient Name: Shankar Daley  MRN: [id number]  Today's Date: 8/3/2022    Admit Date: 8/2/2022     Discharge Needs Assessment     Row Name 08/03/22 0842       Living Environment    People in Home spouse    Name(s) of People in Home Lisa    Current Living Arrangements home    Primary Care Provided by self    Provides Primary Care For no one    Family Caregiver if Needed spouse    Quality of Family Relationships helpful;involved    Able to Return to Prior Arrangements yes       Resource/Environmental Concerns    Transportation Concerns none       Transition Planning    Patient/Family Anticipates Transition to home with family    Patient/Family Anticipated Services at Transition none    Transportation Anticipated family or friend will provide       Discharge Needs Assessment    Readmission Within the Last 30 Days no previous admission in last 30 days    Equipment Currently Used at Home none    Concerns to be Addressed no discharge needs identified;denies needs/concerns at this time    Anticipated Changes Related to Illness none    Equipment Needed After Discharge none               Discharge Plan     Row Name 08/03/22 0844       Plan    Plan Home with spouse    Patient/Family in Agreement with Plan yes    Plan Comments MOON letter checked. Spoke with patient via phone due to panedmic isolation precautions. Introduced self and explained CCP role. Verfied Facesheet and PCP. Patient lives at home with spouse- Lisa [phone] and she is able to provide transportation at [place]. Patient denies h/o HH/SNF and does not use any DME. Patient uses [facility] on [address] [facility] and denies difficulty affording medications. Patient is agreeable to Meds to beds. Patient anticipates no DC needs at this time. If DC'd on PO Vanc will ask pharmacy to f/u on cost.  CCP to follow. Asher MEDRANO              Continued Care and Services - Admitted Since 8/2/2022    Coordination has not been  started for this encounter.          Demographic Summary     Row Name 08/03/22 0839       General Information    Admission Type observation    Arrived From home    Referral Source admission list    Reason for Consult discharge planning    Preferred Language English               Functional Status     Row Name 08/03/22 0841       Functional Status    Usual Activity Tolerance good    Current Activity Tolerance good       Functional Status, IADL    Medications independent    Meal Preparation independent    Housekeeping independent    Laundry independent    Shopping independent    IADL Comments Prior to admission Pt was Ind and driving.       Mental Status Summary    Recent Changes in Mental Status/Cognitive Functioning no changes               Psychosocial    No documentation.                Abuse/Neglect    No documentation.                Legal    No documentation.                Substance Abuse    No documentation.                Patient Forms    No documentation.                   Asher Aguirre RN

## 2022-08-03 NOTE — OUTREACH NOTE
COVID-19 Week 2 Survey    Flowsheet Row Responses   [facility] facility patient discharged from? [place]   Does the patient have one of the following disease processes/diagnoses(primary or secondary)? COVID-19   COVID-19 underlying condition? None   COVID-19 Week 2: Call 1 attempt successful? No   Revoke Readmitted   Discharge diagnosis Diarrhea  COVID19 Detected            KALYN DELACRUZ - Registered Nurse

## 2022-08-03 NOTE — PROGRESS NOTES
Name: Shankar Daley ADMIT: 2022   : 1955  PCP: Brenda Erickson MD    MRN: [id number] LOS: 0 days   AGE/SEX: 67 y.o. male  [facility]OM: [facility]   Subjective   Chief Complaint   Patient presents with   • Diarrhea      Patient is reporting that his diarrhea is starting to improve.  He is not having any nausea or vomiting currently.  No chest pain palpitations or shortness of breath.  He did test positive for COVID about 2 weeks ago and has had some fatigue over that time but he does feel better now than at presentation.    Objective   Vital Signs  Temp:  [96.5 °F (35.8 °C)-97.8 °F (36.6 °C)] 97.2 °F (36.2 °C)  Heart Rate:  [62-72] 69  Resp:  [16] 16  BP: (113-136)/(57-85) 136/57  SpO2:  [97 %-99 %] 98 %  on   ;   Device (Oxygen Therapy): room air  Body mass index is 26.4 kg/m².    Physical Exam  Vitals and nursing note reviewed.   Constitutional:       General: He is not in acute distress.     Appearance: He is not diaphoretic.   HENT:      Head: Normocephalic and atraumatic.   Eyes:      General:         Right eye: No discharge.         Left eye: No discharge.      Conjunctiva/sclera: Conjunctivae normal.   Cardiovascular:      Rate and Rhythm: Normal rate and regular rhythm.      Pulses: Normal pulses.   Pulmonary:      Effort: Pulmonary effort is normal.      Breath sounds: No wheezing.   Abdominal:      General: There is no distension.      Palpations: Abdomen is soft.      Tenderness: There is no abdominal tenderness. There is no guarding or rebound.   Musculoskeletal:         General: No swelling or tenderness.      Cervical back: Neck supple. No tenderness.   Skin:     General: Skin is warm and dry.   Neurological:      Mental Status: He is alert.      Cranial Nerves: No cranial nerve deficit.   Psychiatric:         Mood and Affect: Mood normal.         Behavior: Behavior normal.         Results Review:       I reviewed the patient's new clinical results.     I reviewed prior records.     I reviewed  telemetry/EKG results, sinus on telemetry      Results from last 7 days   Lab Units 08/03/22  0734 08/02/22  1014   WBC 10*3/mm3 6.55 8.65   HEMOGLOBIN g/dL 12.1* 14.5   PLATELETS 10*3/mm3 229 315     Results from last 7 days   Lab Units 08/03/22  0734 08/02/22  1014   SODIUM mmol/L 138 138   POTASSIUM mmol/L 4.7 4.6   CHLORIDE mmol/L 112* 107   CO2 mmol/L 15.0* 15.6*   BUN mg/dL 19 40*   CREATININE mg/dL 0.87 1.85*   GLUCOSE mg/dL 116* 149*   Estimated Creatinine Clearance: 96 mL/min (by C-G formula based on SCr of 0.87 mg/dL).  Results from last 7 days   Lab Units 08/03/22  0734 08/02/22  1014   CALCIUM mg/dL 8.2* 9.4   ALBUMIN g/dL  --  4.60   MAGNESIUM mg/dL  --  1.7          amLODIPine, 5 mg, Oral, Q24H  atorvastatin, 40 mg, Oral, Daily  budesonide-formoterol, 2 puff, Inhalation, BID - RT  cloNIDine, 0.3 mg, Oral, BID  insulin lispro, 0-7 Units, Subcutaneous, TID AC  sodium chloride, 10 mL, Intravenous, Q12H  sulfamethoxazole-trimethoprim, 1 tablet, Oral, Q12H  vancomycin, 125 mg, Oral, Q6H      Pharmacy Consult,   sodium chloride, 125 mL/hr, Last Rate: 125 mL/hr (08/03/22 1235)    Diet Regular; GI Soft    Assessment & Plan      Active Hospital Problems    Diagnosis  POA   • **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       · Cyclospora diarrhea: Continue Bactrim and plan on a about 7-day course.  C. difficile appears to be colonizer and not active infection however because we are giving him antibiotics for another diarrheal infection this could cause exacerbation of C. difficile and we will plan on vancomycin therapy to extend through his Bactrim course.  · MAXIM: Resolved  · Hypertension: Hypotension is improved.  Home  medication Exforge is held.  He is continuing on the clonidine to prevent rebound hypertension.  We will monitor.  · Diabetes: A1c okay.  Plan resume home regimen at discharge.  · Prophylaxis: SCD  · Disposition: Home/possibly tomorrow    Alex Eckert MD  [facility]  08/03/22  14:43 EDT    Dictated portions using Dragon dictation software.    During the entire encounter, I was wearing recommended PPE including face mask and eye protection. Hand sanitization was performed prior to entering room and upon exit.

## 2022-08-03 NOTE — CONSULTS
Nutrition Services    Patient Name:  Shankar Daley  YOB: 1955  MRN: [id number]  Admit Date:  8/2/2022      Comment: Consulted per RN Admit Screen:  67-year-old gentleman who developed diarrhea after returning home from [place] approximately 2 and half weeks ago with (+) Cyclospora cayetanensis and C-Diff.    Pt w/ decreased appetite/po intake x 2 weeks due to diarrhea, N/V and recent COVID w/ resulting 10 lb wt loss. Pt (+) cyclospora cayetanensis and C-Diff and on abx therapy, diarrhea improving, denies N/V today. Pt now tolerating Regular diet w/ 75% po intake at lunch today.    Noted plans for possible d/c home tomorrow per MD notes.    Will follow per protocol.    CLINICAL NUTRITION ASSESSMENT      Reason for Assessment Nurse Admission Screen     H&P  Chief Complaint: Diarrhea.  Weakness.     History of Present Illness:   Pleasant 67-year-old gentleman who developed diarrhea after returning home from [place] approximately 2 and half weeks ago.  His wife also had diarrhea but she seemed to get over it pretty well.  This is persisted.  Initially was associated with nausea and vomiting and is having difficulty keeping food down.  The nausea and vomiting is markedly improved and his appetite is markedly improved also however the diarrhea is still profuse noting loose stools almost every 2 hours.  He describes his stools as being very loose and brown.  No blood or mucus in the stools.  No abdominal cramping.  He did start taking Pepto-Bismol and as expected his stools are now very dark.  No fever sweats or chills.  He has lost about 10 pounds in the last 2 weeks.  Last couple days he has been feeling increasingly weak with very poor exercise tolerance.  No recent history of antibiotic use.  No previous history of C. difficile colitis.    Past Medical History:   Diagnosis Date   • Asthma    • Diabetes mellitus (HCC)    • Diverticulitis    • Hyperlipidemia    • Hypertension        Past Surgical History:  "  Procedure Laterality Date   • APPENDECTOMY     • COLONOSCOPY N/A 12/23/2016    Procedure: COLONOSCOPY INTO CECUM/ TERMINAL ILEUM;  Surgeon: Shad Zambrano MD;  Location: [facility] ENDOSCOPY;  Service:    • COLONOSCOPY N/A 3/7/2022    Procedure: COLONOSCOPY TO CECUM AND TO TI WITH COLD BIOPSIES;  Surgeon: Shad Zambrano MD;  Location: [facility] ENDOSCOPY;  Service: Gastroenterology;  Laterality: N/A;  pre: history of diverticulitis and diarrhea  post: diverticulosis and hemorrhoids        Current Problems   • **Infection of intestine due to Cyclospora cayetanensis [A07.4]   • Clostridioides difficile diarrhea [A04.72]   • MAXIM (acute kidney injury) ([facility]) [N17.9]   • Hypotension [I95.9]   • DM II (diabetes mellitus, type II), controlled ([facility]) [E11.9]     Continue Bactrim for 7 day course for cyclospora dirrhea.  MAXIM resolved  Hypotension improved  A1C ok, resume home regimen  D/c home possibly tomorrow     Nutritional Risk Screening       MST SCORE 2   Risk Screening Criteria Reduced oral intake over the last month, Unintentional weight loss of 10 lb or more in the last 2 months     Encounter Information        Nutrition/Diet History:    Pt w/ decreased appetite/po intake x 2 weeks due to diarrhea, N/V and COVID w/ resulting 10 lb wt loss. Pt (+) cyclospora cayetanensis and C-Diff and on abx therapy, diarrhea improving, denies N/V today. Pt now tolerating Regular diet w/ 75% po intake at lunch today.   Food Preferences:      Supplements:      Typical Activity Pattern: Ambulatory     Factors Affecting Intake: abdominal pain, decreased appetite, diarrhea, nausea, vomiting     Tests/Procedures: No new tests/procedures       Anthropometrics        Current Height  Current Weight  BMI kg/m2 Height: 177.8 cm (70\")  Weight: 83.5 kg (184 lb) (08/02/22 1602)  Body mass index is 26.4 kg/m².       Admission Weight Weight: 83.5 kg (184 lb)   Ideal Body Weight (IBW) 73 kg (160 lb)   Usual Body Weight (UBW) 195 lb     "   Trending Weight Hx     Weight Change 10 lb (5%) wt loss x 2 weeks             PTA: Wt Readings from Last 30 Encounters:   08/02/22 1602 83.5 kg (184 lb)   08/02/22 1015 79.4 kg (175 lb)   07/26/22 1327 83.9 kg (185 lb)   07/26/22 0802 83.9 kg (185 lb)   07/23/22 2233 83.9 kg (185 lb)   07/19/22 0929 83.9 kg (185 lb)   04/29/22 0838 83.9 kg (185 lb)   03/07/22 1108 83.9 kg (185 lb)   11/06/21 1011 83.9 kg (185 lb)   10/20/19 1707 88.6 kg (195 lb 4.8 oz)   10/20/19 1512 86.2 kg (190 lb)   07/21/18 0825 83.9 kg (185 lb)   05/06/18 1215 80.7 kg (178 lb)   05/03/18 1647 83.9 kg (185 lb)   01/22/18 1436 86.2 kg (190 lb)   11/17/17 1520 87.1 kg (192 lb)   09/08/17 1454 87.3 kg (192 lb 6.4 oz)   06/05/17 1637 84.8 kg (187 lb)   03/01/17 1724 83 kg (183 lb)   02/08/17 1435 84.4 kg (186 lb)   12/23/16 0916 82.6 kg (182 lb)   12/13/16 1501 86.2 kg (190 lb)   08/29/16 1636 85.7 kg (189 lb)   04/20/16 1455 86.6 kg (191 lb)   04/06/16 1617 86.2 kg (190 lb)   02/29/16 1653 86.6 kg (191 lb)   08/24/15 1544 85.7 kg (189 lb)   07/14/15 1536 88 kg (194 lb 0.1 oz)   05/12/15 1140 87.5 kg (192 lb 15.9 oz)   02/05/15 1541 88 kg (194 lb 0.1 oz)   01/21/15 1604 87.1 kg (192 lb)            Labs       Pertinent Labs    Results from last 7 days   Lab Units 08/03/22  0734 08/02/22  1014   SODIUM mmol/L 138 138   POTASSIUM mmol/L 4.7 4.6   CHLORIDE mmol/L 112* 107   CO2 mmol/L 15.0* 15.6*   BUN mg/dL 19 40*   CREATININE mg/dL 0.87 1.85*   CALCIUM mg/dL 8.2* 9.4   BILIRUBIN mg/dL  --  0.6   ALK PHOS U/L  --  93   ALT (SGPT) U/L  --  36   AST (SGOT) U/L  --  23   GLUCOSE mg/dL 116* 149*     Results from last 7 days   Lab Units 08/03/22  0734 08/02/22  1014   MAGNESIUM mg/dL  --  1.7   HEMOGLOBIN g/dL 12.1* 14.5   HEMATOCRIT % 36.5* 43.8   WBC 10*3/mm3 6.55 8.65     Results from last 7 days   Lab Units 08/03/22  0734 08/02/22  1014   PLATELETS 10*3/mm3 229 315     COVID19   Date Value Ref Range Status   08/02/2022 Detected (C) Not Detected -  Ref. Range Final     Lab Results   Component Value Date    HGBA1C 6.30 (H) 08/03/2022          Medications           Scheduled Medications amLODIPine, 5 mg, Oral, Q24H  atorvastatin, 40 mg, Oral, Daily  budesonide-formoterol, 2 puff, Inhalation, BID - RT  cloNIDine, 0.3 mg, Oral, BID  insulin lispro, 0-7 Units, Subcutaneous, TID AC  sodium chloride, 10 mL, Intravenous, Q12H  sulfamethoxazole-trimethoprim, 1 tablet, Oral, Q12H  vancomycin, 125 mg, Oral, Q6H       Infusions Pharmacy Consult,   sodium chloride, 125 mL/hr, Last Rate: 125 mL/hr (08/03/22 1235)       PRN Medications •  acetaminophen **OR** acetaminophen **OR** acetaminophen  •  albuterol  •  dextrose  •  dextrose  •  glucagon (human recombinant)  •  nitroglycerin  •  ondansetron **OR** ondansetron  •  Pharmacy Consult  •  [COMPLETED] Insert peripheral IV **AND** sodium chloride  •  sodium chloride     Physical Findings        Physical Appearance alert, oriented, room air     NFPE Not applicable   --  Edema  no edema   Gastrointestinal diarrhea, last bowel movement:8/3   Tubes/Drains none   Oral/Mouth Cavity WNL   Skin skin intact   --  Current Nutrition Orders & Evaluation of Intake       Oral Nutrition     Food Allergies NKFA   Current PO Diet Diet Regular; GI Soft   Supplement n/a   PO Evaluation     Trending % PO Intake 75%        --  Nutrition Diagnosis        Nutrition Dx Problem 1 Problem: Unintentional Weight Loss    Etiology: Factors Affecting Nutrition    Signs/Symptoms: Unintended Weight Change    Comment: diarrhea, N/V       Intervention Goal        Intervention Goal(s) Maintain nutrition status, Reduce/improve symptoms, Meet estimated needs, Disease management/therapy, Maintain intake and No significant weight loss     Nutrition Intervention        RD Action Follow Tx Progress and Care plan reviewed     Nutrition Prescription        Diet Prescription Regular diet   Supplement Prescription n/a   Prescription Ordered no   --  Monitor/Evaluation         Monitor Per protocol     RD to follow per protocol.      Electronically signed by:  Mira Oneill RD  08/03/22 15:58 EDT

## 2022-08-04 ENCOUNTER — READMISSION MANAGEMENT (OUTPATIENT)
Dept: CALL CENTER | Facility: HOSPITAL | Age: 67
End: 2022-08-04

## 2022-08-04 VITALS
DIASTOLIC BLOOD PRESSURE: 82 MMHG | TEMPERATURE: 97.7 F | HEIGHT: 70 IN | SYSTOLIC BLOOD PRESSURE: 120 MMHG | HEART RATE: 55 BPM | OXYGEN SATURATION: 96 % | BODY MASS INDEX: 26.34 KG/M2 | RESPIRATION RATE: 16 BRPM | WEIGHT: 184 LBS

## 2022-08-04 LAB
GLUCOSE BLDC GLUCOMTR-MCNC: 138 MG/DL (ref 70–130)
GLUCOSE BLDC GLUCOMTR-MCNC: 157 MG/DL (ref 70–130)

## 2022-08-04 PROCEDURE — G0378 HOSPITAL OBSERVATION PER HR: HCPCS

## 2022-08-04 PROCEDURE — 82962 GLUCOSE BLOOD TEST: CPT

## 2022-08-04 RX ORDER — VALSARTAN 40 MG/1
40 TABLET ORAL DAILY
Qty: 30 TABLET | Refills: 0 | Status: SHIPPED | OUTPATIENT
Start: 2022-08-04 | End: 2022-11-14

## 2022-08-04 RX ORDER — SULFAMETHOXAZOLE AND TRIMETHOPRIM 800; 160 MG/1; MG/1
1 TABLET ORAL EVERY 12 HOURS SCHEDULED
Qty: 10 TABLET | Refills: 0 | Status: SHIPPED | OUTPATIENT
Start: 2022-08-04 | End: 2022-08-09

## 2022-08-04 RX ORDER — VANCOMYCIN HYDROCHLORIDE 125 MG/1
125 CAPSULE ORAL EVERY 6 HOURS SCHEDULED
Qty: 33 CAPSULE | Refills: 0 | Status: SHIPPED | OUTPATIENT
Start: 2022-08-04 | End: 2022-08-13

## 2022-08-04 RX ADMIN — CLONIDINE HYDROCHLORIDE 0.3 MG: 0.1 TABLET ORAL at 09:41

## 2022-08-04 RX ADMIN — VANCOMYCIN HYDROCHLORIDE 125 MG: 125 CAPSULE ORAL at 00:25

## 2022-08-04 RX ADMIN — VANCOMYCIN HYDROCHLORIDE 125 MG: 125 CAPSULE ORAL at 06:25

## 2022-08-04 RX ADMIN — VANCOMYCIN HYDROCHLORIDE 125 MG: 125 CAPSULE ORAL at 14:19

## 2022-08-04 RX ADMIN — AMLODIPINE BESYLATE 5 MG: 5 TABLET ORAL at 09:41

## 2022-08-04 RX ADMIN — ATORVASTATIN CALCIUM 40 MG: 20 TABLET, FILM COATED ORAL at 09:41

## 2022-08-04 RX ADMIN — Medication 10 ML: at 09:42

## 2022-08-04 RX ADMIN — SULFAMETHOXAZOLE AND TRIMETHOPRIM 1 TABLET: 800; 160 TABLET ORAL at 09:41

## 2022-08-04 NOTE — OUTREACH NOTE
Prep Survey    Flowsheet Row Responses   [facility] facility patient discharged from? [facility]   Is LACE score < 7 ? No   Emergency Room discharge w/ pulse ox? No   Eligibility Readm Mgmt   Discharge diagnosis Infection of intestine due to Cyclospora cayetanensis, COVID   Does the patient have one of the following disease processes/diagnoses(primary or secondary)? COVID-19   Does the patient have Home health ordered? No   Is there a DME ordered? No   Prep survey completed? Yes          AARON DELACRUZ - Registered Nurse

## 2022-08-04 NOTE — DISCHARGE SUMMARY
Date of Admission: 8/2/2022  Date of Discharge:  8/4/2022  Primary Care Physician: Brneda Erickson MD     Discharge Diagnosis:  Active Hospital Problems    Diagnosis  POA   • **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       Presenting Problem/History of Present Illness:  Infection of intestine due to Cyclospora cayetanensis [A07.4]  Hypotension due to hypovolemia [I95.89, E86.1]  Clostridioides difficile diarrhea [A04.72]  COVID [U07.1]     Pleasant 67-year-old gentleman who developed diarrhea after returning home from [place] approximately 2 and half weeks ago.  His wife also had diarrhea but she seemed to get over it pretty well.  This is persisted.  Initially was associated with nausea and vomiting and is having difficulty keeping food down.  The nausea and vomiting is markedly improved and his appetite is markedly improved also however the diarrhea is still profuse noting loose stools almost every 2 hours.  He describes his stools as being very loose and brown.  No blood or mucus in the stools.  No abdominal cramping.  He did start taking Pepto-Bismol and as expected his stools are now very dark.  No fever sweats or chills.  He has lost about 10 pounds in the last 2 weeks.  Last couple days he has been feeling increasingly weak with very poor exercise tolerance.  No recent history of antibiotic use.  No previous history of C. difficile colitis.    Hospital Course:  The patient is a 67 y.o. male who presented with infectious diarrhea which started after return trip from [place].  He tested positive for Cyclospora which is the causative agent.  He has been started on Bactrim and is going to complete a 7-day course of that  with symptomatic improvement.  He also tested positive for C. difficile PCR but the reflex toxin was negative so this likely indicates colonization and not active infection.  That said, we are treating infectious diarrhea with an alternative antibiotic so I have elected to treat this with oral vancomycin to prevent flareup and complicating picture or overt development of C. difficile colitis.  Patient had COVID about 10 days ago.  He is not having any respiratory symptoms and is nearing the end of his isolation.  He did still screen positive here.    He was hypotensive on admission and also had an MAXIM.  Both of those are improved with resuscitation.  He has remained on his clonidine and blood pressure is not grossly elevated.  He does have diabetes listed so I am going to resume the ARB component of his combo antihypertensive at a lower dose for now.  He can follow-up with his primary care and titrate as needed.  A1c is 6.3.    Exam Today:  Constitutional:       General: He is not in acute distress.     Appearance: He is not diaphoretic.   HENT:      Head: Normocephalic and atraumatic.   Eyes:      General:         Right eye: No discharge.         Left eye: No discharge.      Conjunctiva/sclera: Conjunctivae normal.   Cardiovascular:      Rate and Rhythm: Normal rate and regular rhythm.      Pulses: Normal pulses.   Pulmonary:      Effort: Pulmonary effort is normal.      Breath sounds: No wheezing.   Abdominal:      General: There is no distension.      Palpations: Abdomen is soft.      Tenderness: There is no abdominal tenderness. There is no guarding or rebound.   Musculoskeletal:         General: No swelling or tenderness.      Cervical back: Neck supple. No tenderness.   Skin:     General: Skin is warm and dry.   Neurological:      Mental Status: He is alert.      Cranial Nerves: No cranial nerve deficit.   Psychiatric:         Mood and Affect: Mood normal.         Behavior: Behavior normal.      Results:    Procedures Performed:         Consults:   Consults     Date and Time Order Name Status Description    8/2/2022  1:04 PM [facility] (on-call MD unless specified) Details             Discharge Disposition:  Home or Self Care    Discharge Medications:     Discharge Medications      New Medications      Instructions Start Date   sulfamethoxazole-trimethoprim 800-160 MG per tablet  Commonly known as: BACTRIM DS,SEPTRA DS   1 tablet, Oral, Every 12 Hours Scheduled      valsartan 40 MG tablet  Commonly known as: Diovan   40 mg, Oral, Daily      vancomycin 125 MG capsule  Commonly known as: VANCOCIN   125 mg, Oral, Every 6 Hours Scheduled         Changes to Medications      Instructions Start Date   metFORMIN 500 MG tablet  Commonly known as: GLUCOPHAGE  What changed:   · when to take this  · additional instructions   TAKE 1 TABLET DAILY WITH BREAKFAST         Continue These Medications      Instructions Start Date   albuterol sulfate  (90 Base) MCG/ACT inhaler  Commonly known as: ProAir HFA   2 puffs, Inhalation, Every 6 Hours PRN      atorvastatin 40 MG tablet  Commonly known as: LIPITOR   TAKE 1 TABLET DAILY      budesonide-formoterol 160-4.5 MCG/ACT inhaler  Commonly known as: SYMBICORT   2 puffs, Inhalation, As Needed      cloNIDine 0.3 MG tablet  Commonly known as: CATAPRES   0.3 mg, Oral, 2 Times Daily      ezetimibe 10 MG tablet  Commonly known as: ZETIA   10 mg, Oral, Daily         Stop These Medications    amLODIPine-valsartan  MG per tablet  Commonly known as: EXFORGE            Discharge Diet:   Diet Instructions     Advance Diet As Tolerated            Activity at Discharge:   Activity Instructions     Activity as Tolerated            Follow-up Appointments:   Follow-up Information     Brenda Erickson MD .    Specialty: Internal Medicine  Contact information:  [address] [place], [place] [id number] [phone]                         Test Results Pending at Discharge:        Alex Eckert MD  08/04/22  12:40 EDT    Time Spent on Discharge Activities: >30 minutes    Dictated portions using Dragon dictation software.  During the entire encounter, I was wearing recommended PPE including face mask and eye protection. Hand sanitization was performed prior to entering room and upon exit.

## 2022-08-04 NOTE — PLAN OF CARE
Goal Outcome Evaluation:  Plan of Care Reviewed With: patient        Progress: improving  Outcome Evaluation: VSS. Decreased number of stools. Denies any SOA,afebrile. Appetite good. Up ad-fadia in room. No complaints of pain. Discharge instructions and education sheets provided. Discharge to home. Pt drove independently.

## 2022-08-05 ENCOUNTER — READMISSION MANAGEMENT (OUTPATIENT)
Dept: CALL CENTER | Facility: HOSPITAL | Age: 67
End: 2022-08-05

## 2022-08-05 NOTE — OUTREACH NOTE
COVID-19 Week 1 Survey    Flowsheet Row Responses   [facility] facility patient discharged from? [place]   Does the patient have one of the following disease processes/diagnoses(primary or secondary)? COVID-19   COVID-19 underlying condition? None   Call Number Call 1   Week 1 Call successful? No  [All numbers attempted-no answer]   Discharge diagnosis Infection of intestine due to Cyclospora cayetanensis, COVID          ZOE H - Registered Nurse

## 2022-08-05 NOTE — CASE MANAGEMENT/SOCIAL WORK
Case Management Discharge Note      Final Note: Discharged home with family via private transport. Asher MEDRANO         Selected Continued Care - Discharged on 8/4/2022 Admission date: 8/2/2022 - Discharge disposition: Home or Self Care    Destination    No services have been selected for the patient.              Durable Medical Equipment    No services have been selected for the patient.              Dialysis/Infusion    No services have been selected for the patient.              Home Medical Care    No services have been selected for the patient.              Therapy    No services have been selected for the patient.              Community Resources    No services have been selected for the patient.              Community & DME    No services have been selected for the patient.                  Transportation Services  Private: Car    Final Discharge Disposition Code: 01 - home or self-care

## 2022-08-07 ENCOUNTER — READMISSION MANAGEMENT (OUTPATIENT)
Dept: CALL CENTER | Facility: HOSPITAL | Age: 67
End: 2022-08-07

## 2022-08-07 NOTE — OUTREACH NOTE
COVID-19 Week 1 Survey    Flowsheet Row Responses   [facility] patient discharged from? [place]   Does the patient have one of the following disease processes/diagnoses(primary or secondary)? COVID-19   COVID-19 underlying condition? None   Call Number Call 2   Week 1 Call successful? Yes   Call start time 0921   Call end time 0926   Meds reviewed with patient/caregiver? Yes   Is the patient having any side effects they believe may be caused by any medication additions or changes? No   Does the patient have all medications ordered at discharge? Yes   Is the patient taking all medications as directed (includes completed medication regime)? Yes   Comments regarding appointments PCP appt 09/16/22.   Does the patient have a primary care provider?  Yes   Does the patient have an appointment with their PCP or specialist within 7 days of discharge? Greater than 7 days   What is preventing the patient from scheduling follow up appointments within 7 days of discharge? Earlier appointment not available   Nursing Interventions Verified appointment date/time/provider   Has the patient kept scheduled appointments due by today? N/A   Has home health visited the patient within 72 hours of discharge? N/A   Psychosocial issues? No   Did the patient receive a copy of their discharge instructions? Yes   Did the patient receive a copy of COVID-19 specific instructions? Yes   Nursing interventions Reviewed instructions with patient   What is the patient's perception of their health status since discharge? Improving   Does the patient have any of the following symptoms? None   Nursing Interventions Nurse provided patient education   Pulse Ox monitoring None   Is the patient/caregiver able to teach back steps to recovery at home? Set small, achievable goals for return to baseline health, Eat a well-balance diet, Rest and rebuild strength, gradually increase activity, Make a list of questions for provider's appointment   If the  patient is a current smoker, are they able to teach back resources for cessation? Not a smoker   Is the patient/caregiver able to teach back the hierarchy of who to call/visit for symptoms/problems? PCP, Specialist, Home health nurse, Urgent Care, ED, 911 Yes   COVID-19 call completed? Yes   Wrap up additional comments States is improving. Denies any covid s/s. States diarrhea has greatly improved, and appetite good. Denies any needs today. States has completed quarantine.          KATHY VÁSQUEZ - Registered Nurse

## 2023-03-02 ENCOUNTER — TRANSCRIBE ORDERS (OUTPATIENT)
Dept: ADMINISTRATIVE | Facility: HOSPITAL | Age: 68
End: 2023-03-02
Payer: MEDICARE

## 2023-03-02 DIAGNOSIS — Z13.6 SCREENING FOR ISCHEMIC HEART DISEASE: Primary | ICD-10-CM

## 2023-06-12 ENCOUNTER — HOSPITAL ENCOUNTER (OUTPATIENT)
Dept: CARDIOLOGY | Facility: HOSPITAL | Age: 68
Discharge: HOME | End: 2023-06-12

## 2023-06-12 ENCOUNTER — HOSPITAL ENCOUNTER (OUTPATIENT)
Dept: CT IMAGING | Facility: HOSPITAL | Age: 68
Discharge: HOME | End: 2023-06-12

## 2023-06-12 DIAGNOSIS — Z13.6 SCREENING FOR ISCHEMIC HEART DISEASE: ICD-10-CM

## 2023-06-12 LAB
BH CV XLRA MEAS - MID AO DIAM: 1.2 CM
BH CV XLRA MEAS - PAD LEFT ABI PT: 1.27
BH CV XLRA MEAS - PAD LEFT ARM: 160 MMHG
BH CV XLRA MEAS - PAD LEFT LEG PT: 213 MMHG
BH CV XLRA MEAS - PAD RIGHT ABI PT: 1.14
BH CV XLRA MEAS - PAD RIGHT ARM: 168 MMHG
BH CV XLRA MEAS - PAD RIGHT LEG PT: 191 MMHG
BH CV XLRA MEAS LEFT DIST CCA EDV: -17.3 CM/SEC
BH CV XLRA MEAS LEFT DIST CCA PSV: -102 CM/SEC
BH CV XLRA MEAS LEFT ICA/CCA RATIO: 1.2
BH CV XLRA MEAS LEFT MID CCA PSV: 102 CM/SEC
BH CV XLRA MEAS LEFT MID ICA PSV: 127 CM/SEC
BH CV XLRA MEAS LEFT PROX ICA EDV: -21.7 CM/SEC
BH CV XLRA MEAS LEFT PROX ICA PSV: -127 CM/SEC
BH CV XLRA MEAS RIGHT DIST CCA EDV: -10.6 CM/SEC
BH CV XLRA MEAS RIGHT DIST CCA PSV: -83.2 CM/SEC
BH CV XLRA MEAS RIGHT ICA/CCA RATIO: 1.1
BH CV XLRA MEAS RIGHT MID CCA PSV: 83.2 CM/SEC
BH CV XLRA MEAS RIGHT MID ICA PSV: 89.4 CM/SEC
BH CV XLRA MEAS RIGHT PROX ICA EDV: -14.9 CM/SEC
BH CV XLRA MEAS RIGHT PROX ICA PSV: -89.4 CM/SEC

## 2023-06-12 PROCEDURE — 75571 CT HRT W/O DYE W/CA TEST: CPT

## 2023-06-12 PROCEDURE — 93799 UNLISTED CV SVC/PROCEDURE: CPT

## 2023-12-02 ENCOUNTER — HOSPITAL ENCOUNTER (EMERGENCY)
Facility: HOSPITAL | Age: 68
Discharge: HOME | End: 2023-12-02
Attending: EMERGENCY MEDICINE
Payer: MEDICARE

## 2023-12-02 VITALS
OXYGEN SATURATION: 98 % | SYSTOLIC BLOOD PRESSURE: 134 MMHG | TEMPERATURE: 98.1 F | HEART RATE: 92 BPM | RESPIRATION RATE: 18 BRPM | DIASTOLIC BLOOD PRESSURE: 76 MMHG

## 2023-12-02 DIAGNOSIS — H00.014 HORDEOLUM EXTERNUM OF LEFT UPPER EYELID: Primary | ICD-10-CM

## 2023-12-02 PROCEDURE — 99282 EMERGENCY DEPT VISIT SF MDM: CPT

## 2023-12-02 NOTE — ED PROVIDER NOTES
EMERGENCY DEPARTMENT ENCOUNTER    Room Number:  34/34  PCP: Brenda Erickson MD  Patient Care Team:  Brenda Erickson MD as PCP - General (Internal Medicine)   Independent Historians: Patient, family    HPI:  Chief Complaint: Left eyelid swelling    A complete HPI/ROS/PMH/PSH/SH/FH are unobtainable due to: Nothing    Chronic or social conditions impacting patient care (Social Determinants of Health): None  (Financial Resource Strain / Food Insecurity / Transportation Needs / Physical Activity / Stress / Social Connections / Intimate Partner Violence / Housing Stability)    Context: Shankar Daley is a 68 y.o. male who presents to the ED c/o acute left eyelid swelling.  The patient reports that yesterday he developed swelling to his left upper eyelid.  He denies any vision changes.  He denies any pain with extraocular movements.  He denies any prior similar episodes.  He denies any drainage from his eye.  He does wear glasses.  Wife reports that he has been remodeling a bathroom.  He denies any injury to his eye.  He denies fevers.    Additionally he reports that her dermatologist removed a skin cancer from his right forehead 3 weeks ago and the wound has been draining some mild yellow fluid since the surgery.    Review of prior external notes (non-ED) -and- Review of prior external test results outside of this encounter: Laboratory evaluation 8/3/2022 shows a BMP with no significant abnormalities, hemoglobin A1c on the same date was 6.3.  CBC on the same date was normal.    Prescription drug monitoring program review:         PAST MEDICAL HISTORY  Active Ambulatory Problems     Diagnosis Date Noted    Airway hyperreactivity 02/29/2016    Bronchitis 02/29/2016    Cervical radiculitis 02/29/2016    Cervical pain 02/29/2016    CD (contact dermatitis) 02/29/2016    DDD (degenerative disc disease), cervical 02/29/2016    Diabetes 02/29/2016    DM II (diabetes mellitus, type II), controlled 02/29/2016    DD (diverticular  disease) 02/29/2016    BP (high blood pressure) 02/29/2016    HLD (hyperlipidemia) 02/29/2016    Adverse effect of motion 02/29/2016    Allergic dermatitis due to poison ivy 02/29/2016    Borderline diabetes 02/29/2016    Eczema 02/29/2016    Encounter for screening for malignant neoplasm of colon 02/29/2016    Diverticulitis of large intestine 04/20/2016    Health care maintenance 08/29/2016    Hydrochlorothiazide adverse reaction 11/17/2017    Diarrhea 07/26/2022    Clostridioides difficile diarrhea 08/02/2022    MAXIM (acute kidney injury) 08/02/2022    Hypotension 08/02/2022    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     Resolved Ambulatory Problems     Diagnosis Date Noted    No Resolved Ambulatory Problems     Past Medical History:   Diagnosis Date    Asthma     Diabetes mellitus     Diverticulitis     Hyperlipidemia     Hypertension          PAST SURGICAL HISTORY  Past Surgical History:   Procedure Laterality Date    APPENDECTOMY      COLONOSCOPY N/A 12/23/2016    Procedure: COLONOSCOPY INTO CECUM/ TERMINAL ILEUM;  Surgeon: Shad Zambrano MD;  Location: [facility] ENDOSCOPY;  Service:     COLONOSCOPY N/A 3/7/2022    Procedure: COLONOSCOPY TO CECUM AND TO TI WITH COLD BIOPSIES;  Surgeon: Shad Zambrano MD;  Location: [facility] ENDOSCOPY;  Service: Gastroenterology;  Laterality: N/A;  pre: history of diverticulitis and diarrhea  post: diverticulosis and hemorrhoids         FAMILY HISTORY  Family History   Problem Relation Age of Onset    Hypertension Father     Alzheimer's disease Mother     Hypertension Brother     Diabetes Brother     Hypertension Brother          SOCIAL HISTORY  Social History     Socioeconomic History    Marital status:    Tobacco Use    Smoking status: Never    Smokeless tobacco: Never   Vaping Use    Vaping Use: Never used   Substance and Sexual Activity    Alcohol use: Yes     Alcohol/week: 105.0 standard drinks of alcohol     Types: 105 Shots of liquor  per week     Comment: has 2-3 5oz glasses of whiskey daily    Drug use: No    Sexual activity: Yes     Partners: Female         ALLERGIES  Hydrochlorothiazide        REVIEW OF SYSTEMS  Review of Systems  Included in HPI  All systems reviewed and negative except for those discussed in HPI.      PHYSICAL EXAM    I have reviewed the triage vital signs and nursing notes.    ED Triage Vitals   Temp Heart Rate Resp BP SpO2   12/02/23 0759 12/02/23 0759 12/02/23 0759 12/02/23 0801 12/02/23 0759   98.1 °F (36.7 °C) 92 18 165/84 98 %      Temp src Heart Rate Source Patient Position BP Location FiO2 (%)   -- -- -- -- --              Physical Exam  GENERAL: Awake, alert, no acute distress  SKIN: Warm, dry  HENT: Normocephalic, atraumatic.  Right forehead with well-healing wound.  No signs of infection.  No drainage.  No abscess.  No cellulitis.  EYES: no scleral icterus.  Extraocular movements intact without pain.  Left upper lid with mild swelling.  No drainage.  No scleral injection.  Small stye present.  CV: regular rhythm, regular rate  RESPIRATORY: normal effort, lungs clear  ABDOMEN: soft, nontender, nondistended  MUSCULOSKELETAL: no deformity  NEURO: alert, moves all extremities, follows commands                                                               LAB RESULTS  No results found for this or any previous visit (from the past 24 hour(s)).    I ordered the above labs and independently reviewed the results.        RADIOLOGY  No Radiology Exams Resulted Within Past 24 Hours    I ordered the above noted radiological studies. Reviewed by me and discussed with radiologist.  See dictation for official radiology interpretation.      PROCEDURES    Procedures      MEDICATIONS GIVEN IN ER    Medications - No data to display      ORDERS PLACED DURING THIS VISIT:  No orders of the defined types were placed in this encounter.        PROGRESS, DATA ANALYSIS, CONSULTS, AND MEDICAL DECISION MAKING    All labs have been  independently interpreted by me.  All radiology studies have been reviewed by me and discussed with radiologist dictating the report.   EKG's independently viewed and interpreted by me.  Discussion below represents my analysis of pertinent findings related to patient's condition, differential diagnosis, treatment plan and final disposition.    Differential diagnosis includes but is not limited to stye, septal cellulitis, preseptal cellulitis, abscess.    Clinical Scores:                                 AS OF 08:41 EST VITALS:    BP - 165/84  HR - 92  TEMP - 98.1 °F (36.7 °C)  O2 SATS - 98%        DIAGNOSIS  Final diagnoses:   Hordeolum externum of left upper eyelid         DISPOSITION  ED Disposition       ED Disposition   Discharge    Condition   Stable    Comment   --                  Note Disclaimer: At [facility], we believe that sharing information builds trust and better relationships. You are receiving this note because you recently visited [facility]. It is possible you will see health information before a provider has talked with you about it. This kind of information can be easy to misunderstand. To help you fully understand what it means for your health, we urge you to discuss this note with your provider.         Cameron Santamaria MD  12/02/23 0814

## 2023-12-02 NOTE — DISCHARGE INSTRUCTIONS
Apply warm compresses frequently until your symptoms healed.  Return immediately for any fevers over 100.4, change of your vision, or severe pain.

## 2023-12-03 ENCOUNTER — HOSPITAL ENCOUNTER (EMERGENCY)
Facility: HOSPITAL | Age: 68
Discharge: HOME | End: 2023-12-03
Attending: EMERGENCY MEDICINE | Admitting: EMERGENCY MEDICINE
Payer: MEDICARE

## 2023-12-03 VITALS
HEIGHT: 69 IN | TEMPERATURE: 97.7 F | SYSTOLIC BLOOD PRESSURE: 142 MMHG | WEIGHT: 190 LBS | HEART RATE: 88 BPM | OXYGEN SATURATION: 99 % | DIASTOLIC BLOOD PRESSURE: 77 MMHG | RESPIRATION RATE: 18 BRPM | BODY MASS INDEX: 28.14 KG/M2

## 2023-12-03 DIAGNOSIS — H00.014 HORDEOLUM EXTERNUM OF LEFT UPPER EYELID: Primary | ICD-10-CM

## 2023-12-03 PROCEDURE — 99282 EMERGENCY DEPT VISIT SF MDM: CPT

## 2023-12-03 RX ORDER — CEPHALEXIN 500 MG/1
500 CAPSULE ORAL 4 TIMES DAILY
Qty: 20 CAPSULE | Refills: 0 | Status: SHIPPED | OUTPATIENT
Start: 2023-12-03 | End: 2023-12-08

## 2023-12-03 NOTE — DISCHARGE INSTRUCTIONS
Continue using warm compresses.  Follow-up with your eye doctor or your primary care doctor tomorrow.  Return immediately for any fever over 100.4, change of your vision.

## 2023-12-03 NOTE — ED PROVIDER NOTES
EMERGENCY DEPARTMENT ENCOUNTER    Room Number:  32/32  PCP: Brenda Erickson MD  Patient Care Team:  Brenda Erickson MD as PCP - General (Internal Medicine)   Independent Historians: Patient, family    HPI:  Chief Complaint: Left eyelid swelling    A complete HPI/ROS/PMH/PSH/SH/FH are unobtainable due to: Nothing    Chronic or social conditions impacting patient care (Social Determinants of Health): None  (Financial Resource Strain / Food Insecurity / Transportation Needs / Physical Activity / Stress / Social Connections / Intimate Partner Violence / Housing Stability)    Context: Shankar Daley is a 68 y.o. male who presents to the ED c/o acute eyelid swelling.  The patient reports that he has had left upper eyelid swelling for the last 2 days.  He reports he came to the hospital yesterday to be evaluated.  He reports he has been using warm compresses but the swelling has gotten somewhat worse.  He denies any fevers or chills.  He denies any vision changes.  He states he is going to call his ophthalmologist for an appointment to be seen tomorrow.    Review of prior external notes (non-ED) -and- Review of prior external test results outside of this encounter: [facility] evaluation 8/3/2022 shows a BMP with a CO2 of 15.  CBC was normal on the same date hemoglobin A1c was elevated at 6.3 on the same date.    Prescription drug monitoring program review:         PAST MEDICAL HISTORY  Active Ambulatory Problems     Diagnosis Date Noted    Airway hyperreactivity 02/29/2016    Bronchitis 02/29/2016    Cervical radiculitis 02/29/2016    Cervical pain 02/29/2016    CD (contact dermatitis) 02/29/2016    DDD (degenerative disc disease), cervical 02/29/2016    Diabetes 02/29/2016    DM II (diabetes mellitus, type II), controlled 02/29/2016    DD (diverticular disease) 02/29/2016    BP (high blood pressure) 02/29/2016    HLD (hyperlipidemia) 02/29/2016    Adverse effect of motion 02/29/2016    Allergic dermatitis due to poison ivy  02/29/2016    Borderline diabetes 02/29/2016    Eczema 02/29/2016    Encounter for screening for malignant neoplasm of colon 02/29/2016    Diverticulitis of large intestine 04/20/2016    Health care maintenance 08/29/2016    Hydrochlorothiazide adverse reaction 11/17/2017    Diarrhea 07/26/2022    Clostridioides difficile diarrhea 08/02/2022    MAXIM (acute kidney injury) 08/02/2022    Hypotension 08/02/2022    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     Resolved Ambulatory Problems     Diagnosis Date Noted    No Resolved Ambulatory Problems     Past Medical History:   Diagnosis Date    Asthma     Diabetes mellitus     Diverticulitis     Hyperlipidemia     Hypertension          PAST SURGICAL HISTORY  Past Surgical History:   Procedure Laterality Date    APPENDECTOMY      COLONOSCOPY N/A 12/23/2016    Procedure: COLONOSCOPY INTO CECUM/ TERMINAL ILEUM;  Surgeon: Shad Zambrano MD;  Location: [facility] ENDOSCOPY;  Service:     COLONOSCOPY N/A 3/7/2022    Procedure: COLONOSCOPY TO CECUM AND TO TI WITH COLD BIOPSIES;  Surgeon: Shad Zambrano MD;  Location: [facility] ENDOSCOPY;  Service: Gastroenterology;  Laterality: N/A;  pre: history of diverticulitis and diarrhea  post: diverticulosis and hemorrhoids         FAMILY HISTORY  Family History   Problem Relation Age of Onset    Hypertension Father     Alzheimer's disease Mother     Hypertension Brother     Diabetes Brother     Hypertension Brother          SOCIAL HISTORY  Social History     Socioeconomic History    Marital status:    Tobacco Use    Smoking status: Never    Smokeless tobacco: Never   Vaping Use    Vaping Use: Never used   Substance and Sexual Activity    Alcohol use: Yes     Alcohol/week: 105.0 standard drinks of alcohol     Types: 105 Shots of liquor per week     Comment: has 2-3 5oz glasses of whiskey daily    Drug use: No    Sexual activity: Yes     Partners: Female         ALLERGIES  Hydrochlorothiazide        REVIEW  OF SYSTEMS  Review of Systems  Included in HPI  All systems reviewed and negative except for those discussed in HPI.      PHYSICAL EXAM    I have reviewed the triage vital signs and nursing notes.    ED Triage Vitals   Temp Heart Rate Resp BP SpO2   12/03/23 1321 12/03/23 1321 12/03/23 1321 12/03/23 1322 12/03/23 1321   97.7 °F (36.5 °C) 88 18 142/77 99 %      Temp src Heart Rate Source Patient Position BP Location FiO2 (%)   -- -- 12/03/23 1322 12/03/23 1322 --     Sitting Right arm        Physical Exam  GENERAL: Awake, alert, no acute distress  SKIN: Warm, dry  HENT: Normocephalic, atraumatic  EYES: no scleral icterus.  Left upper lid with focal swelling and a stye.  Extraocular movements intact.  No scleral injection.  No conjunctivitis.  CV: regular rhythm, regular rate  RESPIRATORY: normal effort, lungs clear  ABDOMEN: soft, nontender, nondistended  MUSCULOSKELETAL: no deformity  NEURO: alert, moves all extremities, follows commands                                                               LAB RESULTS  No results found for this or any previous visit (from the past 24 hour(s)).    I ordered the above labs and independently reviewed the results.        RADIOLOGY  No Radiology Exams Resulted Within Past 24 Hours    I ordered the above noted radiological studies. Reviewed by me and discussed with radiologist.  See dictation for official radiology interpretation.      PROCEDURES    Procedures      MEDICATIONS GIVEN IN ER    Medications - No data to display      ORDERS PLACED DURING THIS VISIT:  No orders of the defined types were placed in this encounter.        PROGRESS, DATA ANALYSIS, CONSULTS, AND MEDICAL DECISION MAKING    All labs have been independently interpreted by me.  All radiology studies have been reviewed by me and discussed with radiologist dictating the report.   EKG's independently viewed and interpreted by me.  Discussion below represents my analysis of pertinent findings related to patient's  condition, differential diagnosis, treatment plan and final disposition.    Differential diagnosis includes but is not limited to stye, facial cellulitis, preseptal cellulitis.    Clinical Scores:              ED Course as of 12/03/23 1355   Sun Dec 03, 2023   1354 I reassured the patient.  His stye is mildly worse than it was yesterday.  He has a pustule along the upper lid margin now.  This is new from yesterday.  I provided reassurance.  I discussed that antibiotics are not indicated in the treatment of the stye however with his anxiety related to it, plan to start him on a few days of antibiotics.  He is going to call his ophthalmologist for an appointment to be seen.  I encouraged him to continue warm compresses at home.  They are agreeable. [TR]      ED Course User Index  [TR] Cameron Santamaria MD                     AS OF 13:55 EST VITALS:    BP - 142/77  HR - 88  TEMP - 97.7 °F (36.5 °C)  O2 SATS - 99%        DIAGNOSIS  Final diagnoses:   Hordeolum externum of left upper eyelid         DISPOSITION  ED Disposition       ED Disposition   Discharge    Condition   Stable    Comment   --                  Note Disclaimer: At [facility], we believe that sharing information builds trust and better relationships. You are receiving this note because you recently visited [facility]. It is possible you will see health information before a provider has talked with you about it. This kind of information can be easy to misunderstand. To help you fully understand what it means for your health, we urge you to discuss this note with your provider.         Cameron Santamaria MD  12/03/23 1089

## 2024-01-02 ENCOUNTER — LAB (OUTPATIENT)
Dept: LAB | Facility: HOSPITAL | Age: 69
End: 2024-01-02
Payer: MEDICARE

## 2024-01-02 ENCOUNTER — HOSPITAL ENCOUNTER (OUTPATIENT)
Dept: CARDIOLOGY | Facility: HOSPITAL | Age: 69
Discharge: HOME | End: 2024-01-02
Payer: MEDICARE

## 2024-01-02 ENCOUNTER — TRANSCRIBE ORDERS (OUTPATIENT)
Dept: ADMINISTRATIVE | Facility: HOSPITAL | Age: 69
End: 2024-01-02
Payer: MEDICARE

## 2024-01-02 DIAGNOSIS — R97.20 ELEVATED PROSTATE SPECIFIC ANTIGEN (PSA): ICD-10-CM

## 2024-01-02 DIAGNOSIS — Z01.818 PRE-OP EXAM: Primary | ICD-10-CM

## 2024-01-02 DIAGNOSIS — Z01.818 PRE-OP EXAM: ICD-10-CM

## 2024-01-02 DIAGNOSIS — C61 MALIGNANT NEOPLASM OF PROSTATE: ICD-10-CM

## 2024-01-02 LAB
ANION GAP SERPL CALCULATED.3IONS-SCNC: 14.2 MMOL/L (ref 5–15)
BASOPHILS # BLD AUTO: 0.06 10*3/MM3 (ref 0–0.2)
BASOPHILS NFR BLD AUTO: 0.8 % (ref 0–1.5)
BUN SERPL-MCNC: 17 MG/DL (ref 8–23)
BUN/CREAT SERPL: 16.2 (ref 7–25)
CALCIUM SPEC-SCNC: 9.7 MG/DL (ref 8.6–10.5)
CHLORIDE SERPL-SCNC: 103 MMOL/L (ref 98–107)
CO2 SERPL-SCNC: 21.8 MMOL/L (ref 22–29)
CREAT SERPL-MCNC: 1.05 MG/DL (ref 0.76–1.27)
DEPRECATED RDW RBC AUTO: 41 FL (ref 37–54)
EGFRCR SERPLBLD CKD-EPI 2021: 76.8 ML/MIN/1.73
EOSINOPHIL # BLD AUTO: 0.06 10*3/MM3 (ref 0–0.4)
EOSINOPHIL NFR BLD AUTO: 0.8 % (ref 0.3–6.2)
ERYTHROCYTE [DISTWIDTH] IN BLOOD BY AUTOMATED COUNT: 11.8 % (ref 12.3–15.4)
GLUCOSE SERPL-MCNC: 218 MG/DL (ref 65–99)
HCT VFR BLD AUTO: 40.8 % (ref 37.5–51)
HGB BLD-MCNC: 13.9 G/DL (ref 13–17.7)
IMM GRANULOCYTES # BLD AUTO: 0.12 10*3/MM3 (ref 0–0.05)
IMM GRANULOCYTES NFR BLD AUTO: 1.7 % (ref 0–0.5)
LYMPHOCYTES # BLD AUTO: 1.21 10*3/MM3 (ref 0.7–3.1)
LYMPHOCYTES NFR BLD AUTO: 16.8 % (ref 19.6–45.3)
MCH RBC QN AUTO: 32 PG (ref 26.6–33)
MCHC RBC AUTO-ENTMCNC: 34.1 G/DL (ref 31.5–35.7)
MCV RBC AUTO: 94 FL (ref 79–97)
MONOCYTES # BLD AUTO: 0.78 10*3/MM3 (ref 0.1–0.9)
MONOCYTES NFR BLD AUTO: 10.8 % (ref 5–12)
NEUTROPHILS NFR BLD AUTO: 4.98 10*3/MM3 (ref 1.7–7)
NEUTROPHILS NFR BLD AUTO: 69.1 % (ref 42.7–76)
NRBC BLD AUTO-RTO: 0 /100 WBC (ref 0–0.2)
PLATELET # BLD AUTO: 223 10*3/MM3 (ref 140–450)
PMV BLD AUTO: 9.7 FL (ref 6–12)
POTASSIUM SERPL-SCNC: 5 MMOL/L (ref 3.5–5.2)
QT INTERVAL: 380 MS
QTC INTERVAL: 399 MS
RBC # BLD AUTO: 4.34 10*6/MM3 (ref 4.14–5.8)
SODIUM SERPL-SCNC: 139 MMOL/L (ref 136–145)
WBC NRBC COR # BLD AUTO: 7.21 10*3/MM3 (ref 3.4–10.8)

## 2024-01-02 PROCEDURE — 85025 COMPLETE CBC W/AUTO DIFF WBC: CPT

## 2024-01-02 PROCEDURE — 93005 ELECTROCARDIOGRAM TRACING: CPT | Performed by: UROLOGY

## 2024-01-02 PROCEDURE — 36415 COLL VENOUS BLD VENIPUNCTURE: CPT

## 2024-01-02 PROCEDURE — 80048 BASIC METABOLIC PNL TOTAL CA: CPT

## 2024-04-23 ENCOUNTER — TELEPHONE (OUTPATIENT)
Dept: GASTROENTEROLOGY | Facility: CLINIC | Age: 69
End: 2024-04-23
Payer: MEDICARE

## 2024-04-23 ENCOUNTER — OFFICE VISIT (OUTPATIENT)
Dept: GASTROENTEROLOGY | Facility: CLINIC | Age: 69
End: 2024-04-23
Payer: MEDICARE

## 2024-04-23 VITALS
WEIGHT: 194.6 LBS | BODY MASS INDEX: 27.86 KG/M2 | TEMPERATURE: 97.1 F | SYSTOLIC BLOOD PRESSURE: 150 MMHG | HEIGHT: 70 IN | HEART RATE: 90 BPM | DIASTOLIC BLOOD PRESSURE: 75 MMHG

## 2024-04-23 DIAGNOSIS — R13.19 ESOPHAGEAL DYSPHAGIA: Primary | ICD-10-CM

## 2024-04-23 PROCEDURE — 1160F RVW MEDS BY RX/DR IN RCRD: CPT | Performed by: INTERNAL MEDICINE

## 2024-04-23 PROCEDURE — 3077F SYST BP >= 140 MM HG: CPT | Performed by: INTERNAL MEDICINE

## 2024-04-23 PROCEDURE — 99214 OFFICE O/P EST MOD 30 MIN: CPT | Performed by: INTERNAL MEDICINE

## 2024-04-23 PROCEDURE — 1159F MED LIST DOCD IN RCRD: CPT | Performed by: INTERNAL MEDICINE

## 2024-04-23 PROCEDURE — 3078F DIAST BP <80 MM HG: CPT | Performed by: INTERNAL MEDICINE

## 2024-04-23 RX ORDER — NORTRIPTYLINE HYDROCHLORIDE 10 MG/1
10 CAPSULE ORAL NIGHTLY
Qty: 30 CAPSULE | Refills: 11 | Status: SHIPPED | OUTPATIENT
Start: 2024-04-23

## 2024-04-23 RX ORDER — PANTOPRAZOLE SODIUM 40 MG/1
40 TABLET, DELAYED RELEASE ORAL DAILY
Qty: 90 TABLET | Refills: 3 | Status: SHIPPED | OUTPATIENT
Start: 2024-04-23

## 2024-04-23 NOTE — H&P (VIEW-ONLY)
Chief Complaint   Patient presents with    Difficulty Swallowing     Shankar Daley is a 69 y.o. male who presents with a history of dysphagia and chronic diarrhea  HPI    Patient 69-year-old male with history of hypertension, hyperlipidemia and diabetes with chronic diarrhea presenting with progressive dysphagia.  Patient denies any nausea vomiting denies history of heartburn or reflux.  Patient reports several months of progressive intermittent dysphagia that is become more frequent.  Patient denies weight loss no melena or bright red blood per rectum.  Patient denies any nausea vomiting but occasionally has to vomit back up the food.  Patient does report the ongoing diarrhea as well that was learning to live with it.    Past Medical History:   Diagnosis Date    Asthma     Diabetes mellitus     Diverticulitis     Diverticulitis of colon     Hyperlipidemia     Hypertension        Current Outpatient Medications:     Accu-Chek Guide test strip, , Disp: , Rfl:     albuterol (PROAIR HFA) 108 (90 Base) MCG/ACT inhaler, Inhale 2 puffs Every 6 (Six) Hours As Needed for Wheezing., Disp: 5 inhaler, Rfl: 3    amLODIPine-valsartan (EXFORGE)  MG per tablet, , Disp: , Rfl:     atorvastatin (LIPITOR) 40 MG tablet, TAKE 1 TABLET DAILY, Disp: 90 tablet, Rfl: 1    budesonide-formoterol (SYMBICORT) 160-4.5 MCG/ACT inhaler, Inhale 2 puffs As Needed., Disp: , Rfl:     ezetimibe (ZETIA) 10 MG tablet, Take 1 tablet by mouth Daily., Disp: , Rfl:     pantoprazole (PROTONIX) 40 MG EC tablet, Take 1 tablet by mouth Daily., Disp: 90 tablet, Rfl: 3  Allergies   Allergen Reactions    Hydrochlorothiazide Rash     Social History     Socioeconomic History    Marital status:    Tobacco Use    Smoking status: Never    Smokeless tobacco: Never   Vaping Use    Vaping status: Never Used   Substance and Sexual Activity    Alcohol use: Yes     Alcohol/week: 105.0 standard drinks of alcohol     Types: 105 Shots of liquor per week     Comment:  has 2-3 5oz glasses of whiskey daily    Drug use: No    Sexual activity: Yes     Partners: Female     Family History   Problem Relation Age of Onset    Hypertension Father     Alzheimer's disease Mother     Hypertension Brother     Diabetes Brother     Hypertension Brother      Review of Systems   Constitutional: Negative.    HENT:  Positive for trouble swallowing. Negative for congestion and dental problem.    Eyes: Negative.    Respiratory: Negative.     Cardiovascular: Negative.    Gastrointestinal:  Positive for diarrhea and vomiting. Negative for abdominal distention, abdominal pain, anal bleeding, blood in stool, constipation, nausea and rectal pain.   Endocrine: Negative.    Musculoskeletal: Negative.    Skin: Negative.    Allergic/Immunologic: Negative.    Hematological: Negative.    Vitals:    04/23/24 0848   BP: 150/75   Pulse: 90   Temp: 97.1 °F (36.2 °C)     Physical Exam  Vitals and nursing note reviewed.   Constitutional:       Appearance: Normal appearance. He is well-developed and normal weight.   HENT:      Head: Normocephalic and atraumatic.   Eyes:      General: No scleral icterus.     Conjunctiva/sclera: Conjunctivae normal.   Neck:      Trachea: No tracheal deviation.   Cardiovascular:      Rate and Rhythm: Normal rate and regular rhythm.      Heart sounds: Normal heart sounds.   Pulmonary:      Effort: Pulmonary effort is normal. No respiratory distress.      Breath sounds: Normal breath sounds.   Abdominal:      General: Bowel sounds are normal. There is no distension.      Palpations: Abdomen is soft. There is no mass.      Tenderness: There is no abdominal tenderness. There is no guarding or rebound.   Musculoskeletal:         General: No swelling or tenderness. Normal range of motion.      Cervical back: Normal range of motion.   Skin:     General: Skin is warm and dry.      Coloration: Skin is not jaundiced.      Findings: No rash.   Neurological:      General: No focal deficit present.       Mental Status: He is alert and oriented to person, place, and time.      Cranial Nerves: No cranial nerve deficit.   Psychiatric:         Behavior: Behavior normal.         Thought Content: Thought content normal.         Judgment: Judgment normal.   Diagnoses and all orders for this visit:    Esophageal dysphagia  -     Case Request; Standing  -     Implement Anesthesia orders day of procedure.; Standing  -     Case Request  -     FL ESOPHAGRAM SINGLE CONTRAST; Future    Other orders  -     pantoprazole (PROTONIX) 40 MG EC tablet; Take 1 tablet by mouth Daily.    Patient 69-year-old male with history of hypertension, hyperlipidemia with chronic diarrhea presenting with complaints of dysphagia.  Patient reports progressive intermittent esophageal dysphagia that has progressed in frequency.  Patient denies actually needing to have food disimpaction.  Patient denies weight loss but continues to have diarrhea previously experienced.  Will begin with Pamelor 10 mg at bedtime for the loose stools as well as Protonix for likely reflux causing progressive dysphagia.  Will arrange esophagram and follow-up endoscopy to evaluate and possible dilate any stenosis found.  Follow-up clinically.

## 2024-04-23 NOTE — TELEPHONE ENCOUNTER
[facility] SOL IN SCHEDULING PT SCHEDULED 05/13/2024 ARRIVING AT 12:15PM,EGD PREP INFORMATION HANDED TO THE [facility] FOR HUB TO RELAY

## 2024-05-02 ENCOUNTER — HOSPITAL ENCOUNTER (OUTPATIENT)
Dept: GENERAL RADIOLOGY | Facility: HOSPITAL | Age: 69
Discharge: HOME | End: 2024-05-02
Admitting: INTERNAL MEDICINE
Payer: MEDICARE

## 2024-05-02 DIAGNOSIS — R13.19 ESOPHAGEAL DYSPHAGIA: ICD-10-CM

## 2024-05-02 PROCEDURE — 74221 X-RAY XM ESOPHAGUS 2CNTRST: CPT

## 2024-05-02 RX ADMIN — BARIUM SULFATE 183 ML: 960 POWDER, FOR SUSPENSION ORAL at 12:47

## 2024-05-02 RX ADMIN — ANTACID/ANTIFLATULENT 1 PACKET: 380; 550; 10; 10 GRANULE, EFFERVESCENT ORAL at 12:47

## 2024-05-02 RX ADMIN — BARIUM SULFATE 700 MG: 700 TABLET ORAL at 12:47

## 2024-05-02 RX ADMIN — BARIUM SULFATE 135 ML: 980 POWDER, FOR SUSPENSION ORAL at 12:47

## 2024-05-13 ENCOUNTER — HOSPITAL ENCOUNTER (OUTPATIENT)
Facility: HOSPITAL | Age: 69
Setting detail: HOSPITAL OUTPATIENT SURGERY
Discharge: HOME | End: 2024-05-13
Attending: INTERNAL MEDICINE | Admitting: INTERNAL MEDICINE
Payer: MEDICARE

## 2024-05-13 ENCOUNTER — ANESTHESIA EVENT (OUTPATIENT)
Dept: GASTROENTEROLOGY | Facility: HOSPITAL | Age: 69
End: 2024-05-13
Payer: MEDICARE

## 2024-05-13 ENCOUNTER — ANESTHESIA (OUTPATIENT)
Dept: GASTROENTEROLOGY | Facility: HOSPITAL | Age: 69
End: 2024-05-13
Payer: MEDICARE

## 2024-05-13 VITALS
RESPIRATION RATE: 16 BRPM | DIASTOLIC BLOOD PRESSURE: 74 MMHG | WEIGHT: 193 LBS | BODY MASS INDEX: 27.69 KG/M2 | OXYGEN SATURATION: 95 % | SYSTOLIC BLOOD PRESSURE: 133 MMHG | HEART RATE: 61 BPM

## 2024-05-13 DIAGNOSIS — R13.19 ESOPHAGEAL DYSPHAGIA: ICD-10-CM

## 2024-05-13 LAB — GLUCOSE BLDC GLUCOMTR-MCNC: 184 MG/DL (ref 70–130)

## 2024-05-13 PROCEDURE — 43249 ESOPH EGD DILATION <30 MM: CPT | Performed by: INTERNAL MEDICINE

## 2024-05-13 PROCEDURE — 88305 TISSUE EXAM BY PATHOLOGIST: CPT | Performed by: INTERNAL MEDICINE

## 2024-05-13 PROCEDURE — 82948 REAGENT STRIP/BLOOD GLUCOSE: CPT

## 2024-05-13 PROCEDURE — C1726 CATH, BAL DIL, NON-VASCULAR: HCPCS | Performed by: INTERNAL MEDICINE

## 2024-05-13 PROCEDURE — 25010000002 PROPOFOL 10 MG/ML EMULSION: Performed by: NURSE ANESTHETIST, CERTIFIED REGISTERED

## 2024-05-13 PROCEDURE — 43239 EGD BIOPSY SINGLE/MULTIPLE: CPT | Performed by: INTERNAL MEDICINE

## 2024-05-13 PROCEDURE — 25810000003 LACTATED RINGERS PER 1000 ML: Performed by: INTERNAL MEDICINE

## 2024-05-13 RX ORDER — ONDANSETRON 2 MG/ML
4 INJECTION INTRAMUSCULAR; INTRAVENOUS ONCE AS NEEDED
Status: DISCONTINUED | OUTPATIENT
Start: 2024-05-13 | End: 2024-05-13 | Stop reason: HOSPADM

## 2024-05-13 RX ORDER — SODIUM CHLORIDE 0.9 % (FLUSH) 0.9 %
3-10 SYRINGE (ML) INJECTION AS NEEDED
Status: CANCELLED | OUTPATIENT
Start: 2024-05-13

## 2024-05-13 RX ORDER — SODIUM CHLORIDE 0.9 % (FLUSH) 0.9 %
3 SYRINGE (ML) INJECTION EVERY 12 HOURS SCHEDULED
Status: CANCELLED | OUTPATIENT
Start: 2024-05-13

## 2024-05-13 RX ORDER — SODIUM CHLORIDE, SODIUM LACTATE, POTASSIUM CHLORIDE, CALCIUM CHLORIDE 600; 310; 30; 20 MG/100ML; MG/100ML; MG/100ML; MG/100ML
9 INJECTION, SOLUTION INTRAVENOUS CONTINUOUS
Status: CANCELLED | OUTPATIENT
Start: 2024-05-13

## 2024-05-13 RX ORDER — PROPOFOL 10 MG/ML
VIAL (ML) INTRAVENOUS AS NEEDED
Status: DISCONTINUED | OUTPATIENT
Start: 2024-05-13 | End: 2024-05-13 | Stop reason: SURG

## 2024-05-13 RX ORDER — LIDOCAINE HYDROCHLORIDE 20 MG/ML
INJECTION, SOLUTION INFILTRATION; PERINEURAL AS NEEDED
Status: DISCONTINUED | OUTPATIENT
Start: 2024-05-13 | End: 2024-05-13 | Stop reason: SURG

## 2024-05-13 RX ORDER — SODIUM CHLORIDE, SODIUM LACTATE, POTASSIUM CHLORIDE, CALCIUM CHLORIDE 600; 310; 30; 20 MG/100ML; MG/100ML; MG/100ML; MG/100ML
1000 INJECTION, SOLUTION INTRAVENOUS CONTINUOUS
Status: DISCONTINUED | OUTPATIENT
Start: 2024-05-13 | End: 2024-05-13 | Stop reason: HOSPADM

## 2024-05-13 RX ORDER — LIDOCAINE HYDROCHLORIDE 10 MG/ML
0.5 INJECTION, SOLUTION INFILTRATION; PERINEURAL ONCE AS NEEDED
Status: CANCELLED | OUTPATIENT
Start: 2024-05-13

## 2024-05-13 RX ADMIN — LIDOCAINE HYDROCHLORIDE 100 MG: 20 INJECTION, SOLUTION INFILTRATION; PERINEURAL at 14:16

## 2024-05-13 RX ADMIN — SODIUM CHLORIDE, POTASSIUM CHLORIDE, SODIUM LACTATE AND CALCIUM CHLORIDE 1000 ML: 600; 310; 30; 20 INJECTION, SOLUTION INTRAVENOUS at 13:25

## 2024-05-13 RX ADMIN — PROPOFOL 200 MCG/KG/MIN: 10 INJECTION, EMULSION INTRAVENOUS at 14:16

## 2024-05-13 RX ADMIN — PROPOFOL 100 MG: 10 INJECTION, EMULSION INTRAVENOUS at 14:16

## 2024-05-13 NOTE — BRIEF OP NOTE
ESOPHAGOGASTRODUODENOSCOPY  Progress Note    Shankar Daley  5/13/2024    Pre-op Diagnosis:   Esophageal dysphagia [R13.19]       Post-Op Diagnosis Codes:     * Esophageal dysphagia [R13.19]     * Schatzki's ring [K22.2]     * Hiatal hernia [K44.9]     * Gastritis [K29.70]    Procedure/CPT® Codes:        Procedure(s):  ESOPHAGOGASTRODUODENOSCOPY WITH COLD BIOPSIES AND BALLOON DILATATION AT 15, 16.5 AND 18              Surgeon(s):  Shad Zambrano MD    Anesthesia: Monitored Anesthesia Care    Staff:   Endo Technician: Elizabeth Campuzano  Endo Nurse: Iris Geiger RN         Estimated Blood Loss: minimal    Urine Voided: * No values recorded between 5/13/2024  2:01 PM and 5/13/2024  2:27 PM *    Specimens:                Specimens       ID Source Type Tests Collected By Collected At Frozen?    A Gastric, Antrum Tissue TISSUE PATHOLOGY EXAM   Shad Zambrano MD 5/13/24 1106 No    Description: ANTRUM BIOPSIES    This specimen was not marked as sent.                  Drains: * No LDAs found *    Findings: EGD with balloon dilation and biopsy revealed normal duodenal mucosa throughout.  Antral gastritis noted biopsies of the antrum obtained.  Retroflexed view the GE junction did show a small sliding hiatal hernia with a Schatzki's ring at the GE junction.  Balloon dilation to 15, 16.5 and 18 mm performed.  The remainder the esophageal mucosa was normal.        Complications: None          Shad Zambrano MD     Date: 5/13/2024  Time: 14:28 EDT

## 2024-05-13 NOTE — ANESTHESIA POSTPROCEDURE EVALUATION
Patient: Shankar Daley    Procedure Summary       Date: 05/13/24 Room / Location:  [facility] /  [facility]    Anesthesia Start: 1401 Anesthesia Stop: 1431    Procedure: ESOPHAGOGASTRODUODENOSCOPY WITH COLD BIOPSIES AND BALLOON DILATATION AT 15, 16.5 AND 18 (Esophagus) Diagnosis:       Esophageal dysphagia      Schatzki's ring      Hiatal hernia      Gastritis      (Esophageal dysphagia [R13.19])    Surgeons: Shad Zambrano MD Provider: Ming Sosa MD    Anesthesia Type: MAC ASA Status: 2            Anesthesia Type: MAC    Vitals  Vitals Value Taken Time   /74 05/13/24 1452   Temp     Pulse 59 05/13/24 1458   Resp 16 05/13/24 1451   SpO2 96 % 05/13/24 1458   Vitals shown include unfiled device data.        Post Anesthesia Care and Evaluation    Patient location during evaluation: bedside  Patient participation: complete - patient participated  Level of consciousness: awake and alert  Pain management: adequate    Airway patency: patent  Anesthetic complications: No anesthetic complications  PONV Status: controlled  Cardiovascular status: acceptable  Respiratory status: acceptable  Hydration status: acceptable

## 2024-05-13 NOTE — ANESTHESIA PREPROCEDURE EVALUATION
Anesthesia Evaluation     Patient summary reviewed and Nursing notes reviewed   no history of anesthetic complications:   NPO Solid Status: > 8 hours  NPO Liquid Status: > 2 hours           Airway   Dental      Pulmonary    (+) asthma,  Cardiovascular     ECG reviewed    (+) hypertension    ROS comment: EKG normal. Normal stress test    Neuro/Psych  GI/Hepatic/Renal/Endo    (+) diabetes mellitus    Musculoskeletal     Abdominal    Substance History      OB/GYN          Other                          Anesthesia Plan    ASA 2     MAC     intravenous induction     Anesthetic plan, risks, benefits, and alternatives have been provided, discussed and informed consent has been obtained with: patient.        CODE STATUS:

## 2024-05-14 LAB
LAB AP CASE REPORT: NORMAL
PATH REPORT.FINAL DX SPEC: NORMAL
PATH REPORT.GROSS SPEC: NORMAL

## 2024-06-01 ENCOUNTER — HOSPITAL ENCOUNTER (OUTPATIENT)
Facility: HOSPITAL | Age: 69
Setting detail: OBSERVATION
Discharge: HOME | End: 2024-06-02
Attending: EMERGENCY MEDICINE | Admitting: EMERGENCY MEDICINE
Payer: MEDICARE

## 2024-06-01 ENCOUNTER — APPOINTMENT (OUTPATIENT)
Dept: CT IMAGING | Facility: HOSPITAL | Age: 69
End: 2024-06-01
Payer: MEDICARE

## 2024-06-01 DIAGNOSIS — N20.0 NEPHROLITHIASIS: Primary | ICD-10-CM

## 2024-06-01 DIAGNOSIS — N39.0 ACUTE UTI: ICD-10-CM

## 2024-06-01 DIAGNOSIS — E87.29 HIGH ANION GAP METABOLIC ACIDOSIS: ICD-10-CM

## 2024-06-01 LAB
ALBUMIN SERPL-MCNC: 4.9 G/DL (ref 3.5–5.2)
ALBUMIN/GLOB SERPL: 2 G/DL
ALP SERPL-CCNC: 78 U/L (ref 39–117)
ALT SERPL W P-5'-P-CCNC: 35 U/L (ref 1–41)
ANION GAP SERPL CALCULATED.3IONS-SCNC: 17 MMOL/L (ref 5–15)
AST SERPL-CCNC: 23 U/L (ref 1–40)
B-OH-BUTYR SERPL-SCNC: 0.29 MMOL/L (ref 0.02–0.27)
BACTERIA UR QL AUTO: ABNORMAL /HPF
BASOPHILS # BLD AUTO: 0.07 10*3/MM3 (ref 0–0.2)
BASOPHILS NFR BLD AUTO: 1 % (ref 0–1.5)
BILIRUB SERPL-MCNC: 0.4 MG/DL (ref 0–1.2)
BILIRUB UR QL STRIP: NEGATIVE
BUN SERPL-MCNC: 20 MG/DL (ref 8–23)
BUN/CREAT SERPL: 18.3 (ref 7–25)
CALCIUM SPEC-SCNC: 9.6 MG/DL (ref 8.6–10.5)
CHLORIDE SERPL-SCNC: 105 MMOL/L (ref 98–107)
CLARITY UR: CLEAR
CO2 SERPL-SCNC: 21 MMOL/L (ref 22–29)
COLOR UR: YELLOW
CREAT SERPL-MCNC: 1.09 MG/DL (ref 0.76–1.27)
D-LACTATE SERPL-SCNC: 1.5 MMOL/L (ref 0.5–2)
DEPRECATED RDW RBC AUTO: 40.2 FL (ref 37–54)
EGFRCR SERPLBLD CKD-EPI 2021: 73 ML/MIN/1.73
EOSINOPHIL # BLD AUTO: 0.21 10*3/MM3 (ref 0–0.4)
EOSINOPHIL NFR BLD AUTO: 2.9 % (ref 0.3–6.2)
ERYTHROCYTE [DISTWIDTH] IN BLOOD BY AUTOMATED COUNT: 11.8 % (ref 12.3–15.4)
GLOBULIN UR ELPH-MCNC: 2.4 GM/DL
GLUCOSE BLDC GLUCOMTR-MCNC: 214 MG/DL (ref 70–130)
GLUCOSE SERPL-MCNC: 183 MG/DL (ref 65–99)
GLUCOSE UR STRIP-MCNC: NEGATIVE MG/DL
HBA1C MFR BLD: 7.1 % (ref 4.8–5.6)
HCT VFR BLD AUTO: 42.2 % (ref 37.5–51)
HGB BLD-MCNC: 14.6 G/DL (ref 13–17.7)
HGB UR QL STRIP.AUTO: ABNORMAL
HYALINE CASTS UR QL AUTO: ABNORMAL /LPF
IMM GRANULOCYTES # BLD AUTO: 0.16 10*3/MM3 (ref 0–0.05)
IMM GRANULOCYTES NFR BLD AUTO: 2.2 % (ref 0–0.5)
INR PPP: 1.04 (ref 0.9–1.1)
KETONES UR QL STRIP: ABNORMAL
LEUKOCYTE ESTERASE UR QL STRIP.AUTO: ABNORMAL
LIPASE SERPL-CCNC: 55 U/L (ref 13–60)
LYMPHOCYTES # BLD AUTO: 1.83 10*3/MM3 (ref 0.7–3.1)
LYMPHOCYTES NFR BLD AUTO: 25.7 % (ref 19.6–45.3)
MAGNESIUM SERPL-MCNC: 1.2 MG/DL (ref 1.6–2.4)
MCH RBC QN AUTO: 32.4 PG (ref 26.6–33)
MCHC RBC AUTO-ENTMCNC: 34.6 G/DL (ref 31.5–35.7)
MCV RBC AUTO: 93.6 FL (ref 79–97)
MONOCYTES # BLD AUTO: 0.81 10*3/MM3 (ref 0.1–0.9)
MONOCYTES NFR BLD AUTO: 11.4 % (ref 5–12)
NEUTROPHILS NFR BLD AUTO: 4.04 10*3/MM3 (ref 1.7–7)
NEUTROPHILS NFR BLD AUTO: 56.8 % (ref 42.7–76)
NITRITE UR QL STRIP: NEGATIVE
NRBC BLD AUTO-RTO: 0 /100 WBC (ref 0–0.2)
PH UR STRIP.AUTO: 5.5 [PH] (ref 5–8)
PLATELET # BLD AUTO: 211 10*3/MM3 (ref 140–450)
PMV BLD AUTO: 9.8 FL (ref 6–12)
POTASSIUM SERPL-SCNC: 4.4 MMOL/L (ref 3.5–5.2)
PROCALCITONIN SERPL-MCNC: 0.09 NG/ML (ref 0–0.25)
PROT SERPL-MCNC: 7.3 G/DL (ref 6–8.5)
PROT UR QL STRIP: NEGATIVE
PROTHROMBIN TIME: 13.8 SECONDS (ref 11.7–14.2)
RBC # BLD AUTO: 4.51 10*6/MM3 (ref 4.14–5.8)
RBC # UR STRIP: ABNORMAL /HPF
REF LAB TEST METHOD: ABNORMAL
SODIUM SERPL-SCNC: 143 MMOL/L (ref 136–145)
SP GR UR STRIP: 1.02 (ref 1–1.03)
SQUAMOUS #/AREA URNS HPF: ABNORMAL /HPF
UROBILINOGEN UR QL STRIP: ABNORMAL
WBC # UR STRIP: ABNORMAL /HPF
WBC NRBC COR # BLD AUTO: 7.12 10*3/MM3 (ref 3.4–10.8)

## 2024-06-01 PROCEDURE — 85025 COMPLETE CBC W/AUTO DIFF WBC: CPT | Performed by: EMERGENCY MEDICINE

## 2024-06-01 PROCEDURE — 36415 COLL VENOUS BLD VENIPUNCTURE: CPT

## 2024-06-01 PROCEDURE — 83735 ASSAY OF MAGNESIUM: CPT | Performed by: EMERGENCY MEDICINE

## 2024-06-01 PROCEDURE — 87086 URINE CULTURE/COLONY COUNT: CPT | Performed by: EMERGENCY MEDICINE

## 2024-06-01 PROCEDURE — 25010000002 KETOROLAC TROMETHAMINE PER 15 MG: Performed by: EMERGENCY MEDICINE

## 2024-06-01 PROCEDURE — 84145 PROCALCITONIN (PCT): CPT | Performed by: EMERGENCY MEDICINE

## 2024-06-01 PROCEDURE — 25010000002 MAGNESIUM SULFATE 2 GM/50ML SOLUTION: Performed by: EMERGENCY MEDICINE

## 2024-06-01 PROCEDURE — 25010000002 FENTANYL CITRATE (PF) 50 MCG/ML SOLUTION: Performed by: EMERGENCY MEDICINE

## 2024-06-01 PROCEDURE — 25010000002 ONDANSETRON PER 1 MG

## 2024-06-01 PROCEDURE — 63710000001 INSULIN LISPRO (HUMAN) PER 5 UNITS

## 2024-06-01 PROCEDURE — 85610 PROTHROMBIN TIME: CPT | Performed by: EMERGENCY MEDICINE

## 2024-06-01 PROCEDURE — 25510000001 IOPAMIDOL 61 % SOLUTION: Performed by: EMERGENCY MEDICINE

## 2024-06-01 PROCEDURE — 74177 CT ABD & PELVIS W/CONTRAST: CPT

## 2024-06-01 PROCEDURE — 83036 HEMOGLOBIN GLYCOSYLATED A1C: CPT

## 2024-06-01 PROCEDURE — 96376 TX/PRO/DX INJ SAME DRUG ADON: CPT

## 2024-06-01 PROCEDURE — 83605 ASSAY OF LACTIC ACID: CPT | Performed by: EMERGENCY MEDICINE

## 2024-06-01 PROCEDURE — 96361 HYDRATE IV INFUSION ADD-ON: CPT

## 2024-06-01 PROCEDURE — 87040 BLOOD CULTURE FOR BACTERIA: CPT | Performed by: EMERGENCY MEDICINE

## 2024-06-01 PROCEDURE — 25810000003 SODIUM CHLORIDE 0.9 % SOLUTION 250 ML FLEX CONT

## 2024-06-01 PROCEDURE — 96365 THER/PROPH/DIAG IV INF INIT: CPT

## 2024-06-01 PROCEDURE — G0378 HOSPITAL OBSERVATION PER HR: HCPCS

## 2024-06-01 PROCEDURE — 25810000003 SODIUM CHLORIDE 0.9 % SOLUTION: Performed by: EMERGENCY MEDICINE

## 2024-06-01 PROCEDURE — 25010000002 HYDROMORPHONE 1 MG/ML SOLUTION: Performed by: EMERGENCY MEDICINE

## 2024-06-01 PROCEDURE — 83690 ASSAY OF LIPASE: CPT | Performed by: EMERGENCY MEDICINE

## 2024-06-01 PROCEDURE — 82010 KETONE BODYS QUAN: CPT | Performed by: EMERGENCY MEDICINE

## 2024-06-01 PROCEDURE — 81001 URINALYSIS AUTO W/SCOPE: CPT | Performed by: EMERGENCY MEDICINE

## 2024-06-01 PROCEDURE — 82948 REAGENT STRIP/BLOOD GLUCOSE: CPT

## 2024-06-01 PROCEDURE — 80053 COMPREHEN METABOLIC PANEL: CPT | Performed by: EMERGENCY MEDICINE

## 2024-06-01 PROCEDURE — 25010000002 CEFTRIAXONE PER 250 MG: Performed by: EMERGENCY MEDICINE

## 2024-06-01 PROCEDURE — 25010000002 ONDANSETRON PER 1 MG: Performed by: EMERGENCY MEDICINE

## 2024-06-01 PROCEDURE — 96375 TX/PRO/DX INJ NEW DRUG ADDON: CPT

## 2024-06-01 PROCEDURE — 99285 EMERGENCY DEPT VISIT HI MDM: CPT

## 2024-06-01 RX ORDER — IBUPROFEN 600 MG/1
1 TABLET ORAL
Status: DISCONTINUED | OUTPATIENT
Start: 2024-06-01 | End: 2024-06-02 | Stop reason: HOSPADM

## 2024-06-01 RX ORDER — BISACODYL 10 MG
10 SUPPOSITORY, RECTAL RECTAL DAILY PRN
Status: DISCONTINUED | OUTPATIENT
Start: 2024-06-01 | End: 2024-06-02 | Stop reason: HOSPADM

## 2024-06-01 RX ORDER — KETOROLAC TROMETHAMINE 15 MG/ML
15 INJECTION, SOLUTION INTRAMUSCULAR; INTRAVENOUS ONCE
Status: COMPLETED | OUTPATIENT
Start: 2024-06-01 | End: 2024-06-01

## 2024-06-01 RX ORDER — CLONIDINE HYDROCHLORIDE 0.2 MG/1
0.2 TABLET ORAL NIGHTLY
COMMUNITY

## 2024-06-01 RX ORDER — BISACODYL 5 MG/1
5 TABLET, DELAYED RELEASE ORAL DAILY PRN
Status: DISCONTINUED | OUTPATIENT
Start: 2024-06-01 | End: 2024-06-02 | Stop reason: HOSPADM

## 2024-06-01 RX ORDER — AMLODIPINE BESYLATE 10 MG/1
10 TABLET ORAL
Status: DISCONTINUED | OUTPATIENT
Start: 2024-06-02 | End: 2024-06-02 | Stop reason: HOSPADM

## 2024-06-01 RX ORDER — SODIUM CHLORIDE 0.9 % (FLUSH) 0.9 %
10 SYRINGE (ML) INJECTION EVERY 12 HOURS SCHEDULED
Status: DISCONTINUED | OUTPATIENT
Start: 2024-06-01 | End: 2024-06-02 | Stop reason: HOSPADM

## 2024-06-01 RX ORDER — ACETAMINOPHEN 325 MG/1
650 TABLET ORAL EVERY 4 HOURS PRN
Status: DISCONTINUED | OUTPATIENT
Start: 2024-06-01 | End: 2024-06-02 | Stop reason: HOSPADM

## 2024-06-01 RX ORDER — CLONIDINE HYDROCHLORIDE 0.1 MG/1
0.4 TABLET ORAL DAILY
Status: DISCONTINUED | OUTPATIENT
Start: 2024-06-02 | End: 2024-06-02 | Stop reason: HOSPADM

## 2024-06-01 RX ORDER — HYDROMORPHONE HYDROCHLORIDE 1 MG/ML
0.5 INJECTION, SOLUTION INTRAMUSCULAR; INTRAVENOUS; SUBCUTANEOUS EVERY 4 HOURS PRN
Status: DISCONTINUED | OUTPATIENT
Start: 2024-06-01 | End: 2024-06-02 | Stop reason: HOSPADM

## 2024-06-01 RX ORDER — SODIUM CHLORIDE 0.9 % (FLUSH) 0.9 %
10 SYRINGE (ML) INJECTION AS NEEDED
Status: DISCONTINUED | OUTPATIENT
Start: 2024-06-01 | End: 2024-06-02 | Stop reason: HOSPADM

## 2024-06-01 RX ORDER — SODIUM CHLORIDE 9 MG/ML
125 INJECTION, SOLUTION INTRAVENOUS CONTINUOUS
Status: DISCONTINUED | OUTPATIENT
Start: 2024-06-01 | End: 2024-06-02 | Stop reason: HOSPADM

## 2024-06-01 RX ORDER — NORTRIPTYLINE HYDROCHLORIDE 10 MG/1
10 CAPSULE ORAL NIGHTLY
Status: DISCONTINUED | OUTPATIENT
Start: 2024-06-01 | End: 2024-06-02 | Stop reason: HOSPADM

## 2024-06-01 RX ORDER — NICOTINE POLACRILEX 4 MG
15 LOZENGE BUCCAL
Status: DISCONTINUED | OUTPATIENT
Start: 2024-06-01 | End: 2024-06-02 | Stop reason: HOSPADM

## 2024-06-01 RX ORDER — CLONIDINE HYDROCHLORIDE 0.2 MG/1
0.4 TABLET ORAL DAILY
COMMUNITY

## 2024-06-01 RX ORDER — POLYETHYLENE GLYCOL 3350 17 G/17G
17 POWDER, FOR SOLUTION ORAL DAILY PRN
Status: DISCONTINUED | OUTPATIENT
Start: 2024-06-01 | End: 2024-06-02 | Stop reason: HOSPADM

## 2024-06-01 RX ORDER — MULTIPLE VITAMINS W/ MINERALS TAB 9MG-400MCG
1 TAB ORAL DAILY
COMMUNITY

## 2024-06-01 RX ORDER — CLONIDINE HYDROCHLORIDE 0.1 MG/1
0.2 TABLET ORAL NIGHTLY
Status: DISCONTINUED | OUTPATIENT
Start: 2024-06-01 | End: 2024-06-02 | Stop reason: HOSPADM

## 2024-06-01 RX ORDER — NITROGLYCERIN 0.4 MG/1
0.4 TABLET SUBLINGUAL
Status: DISCONTINUED | OUTPATIENT
Start: 2024-06-01 | End: 2024-06-02 | Stop reason: HOSPADM

## 2024-06-01 RX ORDER — AMOXICILLIN 250 MG
2 CAPSULE ORAL 2 TIMES DAILY
Status: DISCONTINUED | OUTPATIENT
Start: 2024-06-01 | End: 2024-06-02 | Stop reason: HOSPADM

## 2024-06-01 RX ORDER — INSULIN LISPRO 100 [IU]/ML
2-7 INJECTION, SOLUTION INTRAVENOUS; SUBCUTANEOUS
Status: DISCONTINUED | OUTPATIENT
Start: 2024-06-01 | End: 2024-06-02 | Stop reason: HOSPADM

## 2024-06-01 RX ORDER — FENTANYL CITRATE 50 UG/ML
50 INJECTION, SOLUTION INTRAMUSCULAR; INTRAVENOUS ONCE
Status: COMPLETED | OUTPATIENT
Start: 2024-06-01 | End: 2024-06-01

## 2024-06-01 RX ORDER — ONDANSETRON 4 MG/1
4 TABLET, ORALLY DISINTEGRATING ORAL EVERY 6 HOURS PRN
Status: DISCONTINUED | OUTPATIENT
Start: 2024-06-01 | End: 2024-06-02 | Stop reason: HOSPADM

## 2024-06-01 RX ORDER — SODIUM CHLORIDE 9 MG/ML
40 INJECTION, SOLUTION INTRAVENOUS AS NEEDED
Status: DISCONTINUED | OUTPATIENT
Start: 2024-06-01 | End: 2024-06-02 | Stop reason: HOSPADM

## 2024-06-01 RX ORDER — DEXTROSE MONOHYDRATE 25 G/50ML
25 INJECTION, SOLUTION INTRAVENOUS
Status: DISCONTINUED | OUTPATIENT
Start: 2024-06-01 | End: 2024-06-02 | Stop reason: HOSPADM

## 2024-06-01 RX ORDER — VALSARTAN 320 MG/1
320 TABLET ORAL
Status: DISCONTINUED | OUTPATIENT
Start: 2024-06-02 | End: 2024-06-02 | Stop reason: HOSPADM

## 2024-06-01 RX ORDER — TAMSULOSIN HYDROCHLORIDE 0.4 MG/1
0.4 CAPSULE ORAL DAILY
Status: DISCONTINUED | OUTPATIENT
Start: 2024-06-01 | End: 2024-06-02 | Stop reason: HOSPADM

## 2024-06-01 RX ORDER — OXYCODONE AND ACETAMINOPHEN 7.5; 325 MG/1; MG/1
1 TABLET ORAL EVERY 6 HOURS PRN
Status: DISCONTINUED | OUTPATIENT
Start: 2024-06-01 | End: 2024-06-02 | Stop reason: HOSPADM

## 2024-06-01 RX ORDER — ONDANSETRON 2 MG/ML
4 INJECTION INTRAMUSCULAR; INTRAVENOUS EVERY 6 HOURS PRN
Status: DISCONTINUED | OUTPATIENT
Start: 2024-06-01 | End: 2024-06-02 | Stop reason: HOSPADM

## 2024-06-01 RX ORDER — ATORVASTATIN CALCIUM 20 MG/1
40 TABLET, FILM COATED ORAL DAILY
Status: DISCONTINUED | OUTPATIENT
Start: 2024-06-02 | End: 2024-06-02 | Stop reason: HOSPADM

## 2024-06-01 RX ORDER — PANTOPRAZOLE SODIUM 40 MG/1
40 TABLET, DELAYED RELEASE ORAL DAILY
Status: DISCONTINUED | OUTPATIENT
Start: 2024-06-02 | End: 2024-06-02 | Stop reason: HOSPADM

## 2024-06-01 RX ORDER — ONDANSETRON 2 MG/ML
4 INJECTION INTRAMUSCULAR; INTRAVENOUS ONCE
Status: COMPLETED | OUTPATIENT
Start: 2024-06-01 | End: 2024-06-01

## 2024-06-01 RX ORDER — MAGNESIUM SULFATE HEPTAHYDRATE 40 MG/ML
2 INJECTION, SOLUTION INTRAVENOUS ONCE
Status: COMPLETED | OUTPATIENT
Start: 2024-06-01 | End: 2024-06-01

## 2024-06-01 RX ADMIN — IOPAMIDOL 85 ML: 612 INJECTION, SOLUTION INTRAVENOUS at 18:46

## 2024-06-01 RX ADMIN — SODIUM CHLORIDE 125 ML/HR: 9 INJECTION, SOLUTION INTRAVENOUS at 18:33

## 2024-06-01 RX ADMIN — ONDANSETRON 4 MG: 2 INJECTION INTRAMUSCULAR; INTRAVENOUS at 20:35

## 2024-06-01 RX ADMIN — INSULIN LISPRO 3 UNITS: 100 INJECTION, SOLUTION INTRAVENOUS; SUBCUTANEOUS at 22:38

## 2024-06-01 RX ADMIN — LIDOCAINE HYDROCHLORIDE 125 MG: 10 INJECTION, SOLUTION EPIDURAL; INFILTRATION; INTRACAUDAL; PERINEURAL at 22:45

## 2024-06-01 RX ADMIN — SODIUM CHLORIDE 1000 ML: 9 INJECTION, SOLUTION INTRAVENOUS at 20:24

## 2024-06-01 RX ADMIN — KETOROLAC TROMETHAMINE 15 MG: 15 INJECTION, SOLUTION INTRAMUSCULAR; INTRAVENOUS at 17:51

## 2024-06-01 RX ADMIN — Medication 10 ML: at 20:25

## 2024-06-01 RX ADMIN — HYDROMORPHONE HYDROCHLORIDE 1 MG: 1 INJECTION, SOLUTION INTRAMUSCULAR; INTRAVENOUS; SUBCUTANEOUS at 19:23

## 2024-06-01 RX ADMIN — FENTANYL CITRATE 50 MCG: 50 INJECTION, SOLUTION INTRAMUSCULAR; INTRAVENOUS at 17:51

## 2024-06-01 RX ADMIN — SODIUM CHLORIDE 500 ML: 9 INJECTION, SOLUTION INTRAVENOUS at 17:48

## 2024-06-01 RX ADMIN — CLONIDINE HYDROCHLORIDE 0.2 MG: 0.1 TABLET ORAL at 22:29

## 2024-06-01 RX ADMIN — OXYCODONE AND ACETAMINOPHEN 1 TABLET: 7.5; 325 TABLET ORAL at 22:28

## 2024-06-01 RX ADMIN — ONDANSETRON 4 MG: 2 INJECTION INTRAMUSCULAR; INTRAVENOUS at 17:51

## 2024-06-01 RX ADMIN — CEFTRIAXONE 2000 MG: 2 INJECTION, POWDER, FOR SOLUTION INTRAMUSCULAR; INTRAVENOUS at 20:24

## 2024-06-01 RX ADMIN — MAGNESIUM SULFATE HEPTAHYDRATE 2 G: 2 INJECTION, SOLUTION INTRAVENOUS at 19:23

## 2024-06-01 RX ADMIN — TAMSULOSIN HYDROCHLORIDE 0.4 MG: 0.4 CAPSULE ORAL at 22:29

## 2024-06-01 NOTE — ED PROVIDER NOTES
EMERGENCY DEPARTMENT ENCOUNTER    Room Number:  18/18  Date of encounter:  6/1/2024  PCP: Brenda Erickson MD  Historian: Patient and spouse  Relevant information and history provided by sources other than the patient will be included below and in the ED Course.  Review of pertinent past medical records may also be included in record below and ED Course.    HPI:  Chief Complaint: Left groin pain  A complete HPI/ROS/PMH/PSH/SH/FH are unobtainable due to: Not applicable  Context: Shankar Daley is a 69 y.o. male who presents to the ED c/o patient started this morning with pain in the left groin.  He points to right in the inguinal region.  He points is if it goes up and points to the left side of his abdomen to the lower left flank and then he points that it goes down to his left testicle.  He has never had a kidney stone.  This was more gradual in onset and has been constant.  He is not aware of anything that makes the pain better or worse.  He has had no fevers or chills or problems urinating.  Denies any real testicle pain.  No testicular swelling or groin swelling.  He had a history of diverticulitis in the past.  Denies any chest pain, shortness of breath, fevers or chills.        Previous Episodes: No  Current Symptoms: See above    MEDICAL HISTORY REVIEWED  Patient has a history of hypertension and hyperlipidemia I reviewed his medicine list      PAST MEDICAL HISTORY  Active Ambulatory Problems     Diagnosis Date Noted    Airway hyperreactivity 02/29/2016    Bronchitis 02/29/2016    Cervical radiculitis 02/29/2016    Cervical pain 02/29/2016    CD (contact dermatitis) 02/29/2016    DDD (degenerative disc disease), cervical 02/29/2016    Diabetes 02/29/2016    DM II (diabetes mellitus, type II), controlled 02/29/2016    DD (diverticular disease) 02/29/2016    BP (high blood pressure) 02/29/2016    HLD (hyperlipidemia) 02/29/2016    Adverse effect of motion 02/29/2016    Allergic dermatitis due to poison ivy  02/29/2016    Borderline diabetes 02/29/2016    Eczema 02/29/2016    Encounter for screening for malignant neoplasm of colon 02/29/2016    Diverticulitis of large intestine 04/20/2016    Health care maintenance 08/29/2016    Hydrochlorothiazide adverse reaction 11/17/2017    Diarrhea 07/26/2022    Clostridioides difficile diarrhea 08/02/2022    MAXIM (acute kidney injury) 08/02/2022    Hypotension 08/02/2022    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    Esophageal dysphagia 04/23/2024     Resolved Ambulatory Problems     Diagnosis Date Noted    No Resolved Ambulatory Problems     Past Medical History:   Diagnosis Date    Asthma     Diabetes mellitus     Diverticulitis     Diverticulitis of colon     Hyperlipidemia     Hypertension          PAST SURGICAL HISTORY  Past Surgical History:   Procedure Laterality Date    APPENDECTOMY      COLONOSCOPY N/A 12/23/2016    Procedure: COLONOSCOPY INTO CECUM/ TERMINAL ILEUM;  Surgeon: Shad Zambrano MD;  Location: [facility] ENDOSCOPY;  Service:     COLONOSCOPY N/A 3/7/2022    Procedure: COLONOSCOPY TO CECUM AND TO TI WITH COLD BIOPSIES;  Surgeon: Shad Zambrano MD;  Location: [facility] ENDOSCOPY;  Service: Gastroenterology;  Laterality: N/A;  pre: history of diverticulitis and diarrhea  post: diverticulosis and hemorrhoids    ENDOSCOPY N/A 5/13/2024    Procedure: ESOPHAGOGASTRODUODENOSCOPY WITH COLD BIOPSIES AND BALLOON DILATATION AT 15, 16.5 AND 18;  Surgeon: Shad Zambrano MD;  Location: [facility] ENDOSCOPY;  Service: Gastroenterology;  Laterality: N/A;  PRE- DYSPHAGIA  POST- GASTRITIS, HIATAL HERNIA, SCHATZKIS RING         FAMILY HISTORY  Family History   Problem Relation Age of Onset    Hypertension Father     Alzheimer's disease Mother     Hypertension Brother     Diabetes Brother     Hypertension Brother          SOCIAL HISTORY  Social History     Socioeconomic History    Marital status:    Tobacco Use    Smoking status: Never     Smokeless tobacco: Never   Vaping Use    Vaping status: Never Used   Substance and Sexual Activity    Alcohol use: Yes     Alcohol/week: 105.0 standard drinks of alcohol     Types: 105 Shots of liquor per week     Comment: has 2-3 5oz glasses of whiskey daily    Drug use: No    Sexual activity: Yes     Partners: Female         ALLERGIES  Hydrochlorothiazide        REVIEW OF SYSTEMS  Review of Systems     All systems reviewed and negative except for those discussed in HPI.       PHYSICAL EXAM    I have reviewed the triage vital signs and nursing notes.    ED Triage Vitals   Temp Heart Rate Resp BP SpO2   06/01/24 1710 06/01/24 1710 06/01/24 1710 06/01/24 1716 06/01/24 1710   97.5 °F (36.4 °C) 111 18 177/87 96 %      Temp src Heart Rate Source Patient Position BP Location FiO2 (%)   06/01/24 1710 -- 06/01/24 1716 06/01/24 1716 --   Tympanic  Lying Left arm        GENERAL: Elderly male.  No acute distress.Vital signs on my initial evaluation have been reviewed.  On my exam his heart rate is 100.  O2 sats 100% on room air.  He is afebrile.  Blood pressure is unremarkable  HENT: nares patent  Head/neck/ face are symmetric without gross deformity, signs of trauma, or swelling  EYES: no scleral icterus, no conjunctival pallor.  NECK: Supple, no meningismus  CV: regular rhythm, regular rate with intact distal pulses.  RESPIRATORY: normal effort and no respiratory distress.  Clear to auscultation bilaterally  ABDOMEN: He has normal inspection to his abdomen and groin and flanks bilaterally.  He has no obvious swelling or rash.  His location of his pain is in the left inguinal area it extends distally to his left testicle and then to the lower left flank.  It is not reproducible with palpation.  GENITOURINARY: Normal phallus. Bilaterally descended testes without masses. No scrotal masses. No hernia.  No inguinal adenopathy.  No rash or signs of infection. No urethral discharge.  Patient's location of pain again is in the left  inguinal area and goes to his left testicle.  It is not reproducible pain with palpation to his left testicle.  Normal inspection and no swelling or rash.  MUSCULOSKELETAL: no deformity.  Intact distal pulses that are equal strong and symmetric.  NEURO: alert and appropriate, moves all extremities, follows commands.  No focal motor or sensory changes  SKIN: warm, dry    Vital signs and nursing notes reviewed.        LAB RESULTS  Recent Results (from the past 24 hour(s))   Comprehensive Metabolic Panel    Collection Time: 06/01/24  5:46 PM    Specimen: Blood   Result Value Ref Range    Glucose 183 (H) 65 - 99 mg/dL    BUN 20 8 - 23 mg/dL    Creatinine 1.09 0.76 - 1.27 mg/dL    Sodium 143 136 - 145 mmol/L    Potassium 4.4 3.5 - 5.2 mmol/L    Chloride 105 98 - 107 mmol/L    CO2 21.0 (L) 22.0 - 29.0 mmol/L    Calcium 9.6 8.6 - 10.5 mg/dL    Total Protein 7.3 6.0 - 8.5 g/dL    Albumin 4.9 3.5 - 5.2 g/dL    ALT (SGPT) 35 1 - 41 U/L    AST (SGOT) 23 1 - 40 U/L    Alkaline Phosphatase 78 39 - 117 U/L    Total Bilirubin 0.4 0.0 - 1.2 mg/dL    Globulin 2.4 gm/dL    A/G Ratio 2.0 g/dL    BUN/Creatinine Ratio 18.3 7.0 - 25.0    Anion Gap 17.0 (H) 5.0 - 15.0 mmol/L    eGFR 73.0 >60.0 mL/min/1.73   Protime-INR    Collection Time: 06/01/24  5:46 PM    Specimen: Blood   Result Value Ref Range    Protime 13.8 11.7 - 14.2 Seconds    INR 1.04 0.90 - 1.10   Lipase    Collection Time: 06/01/24  5:46 PM    Specimen: Blood   Result Value Ref Range    Lipase 55 13 - 60 U/L   Magnesium    Collection Time: 06/01/24  5:46 PM    Specimen: Blood   Result Value Ref Range    Magnesium 1.2 (L) 1.6 - 2.4 mg/dL   CBC Auto Differential    Collection Time: 06/01/24  5:46 PM    Specimen: Blood   Result Value Ref Range    WBC 7.12 3.40 - 10.80 10*3/mm3    RBC 4.51 4.14 - 5.80 10*6/mm3    Hemoglobin 14.6 13.0 - 17.7 g/dL    Hematocrit 42.2 37.5 - 51.0 %    MCV 93.6 79.0 - 97.0 fL    MCH 32.4 26.6 - 33.0 pg    MCHC 34.6 31.5 - 35.7 g/dL    RDW 11.8 (L)  12.3 - 15.4 %    RDW-SD 40.2 37.0 - 54.0 fl    MPV 9.8 6.0 - 12.0 fL    Platelets 211 140 - 450 10*3/mm3    Neutrophil % 56.8 42.7 - 76.0 %    Lymphocyte % 25.7 19.6 - 45.3 %    Monocyte % 11.4 5.0 - 12.0 %    Eosinophil % 2.9 0.3 - 6.2 %    Basophil % 1.0 0.0 - 1.5 %    Immature Grans % 2.2 (H) 0.0 - 0.5 %    Neutrophils, Absolute 4.04 1.70 - 7.00 10*3/mm3    Lymphocytes, Absolute 1.83 0.70 - 3.10 10*3/mm3    Monocytes, Absolute 0.81 0.10 - 0.90 10*3/mm3    Eosinophils, Absolute 0.21 0.00 - 0.40 10*3/mm3    Basophils, Absolute 0.07 0.00 - 0.20 10*3/mm3    Immature Grans, Absolute 0.16 (H) 0.00 - 0.05 10*3/mm3    nRBC 0.0 0.0 - 0.2 /100 WBC   Procalcitonin    Collection Time: 06/01/24  5:46 PM    Specimen: Blood   Result Value Ref Range    Procalcitonin 0.09 0.00 - 0.25 ng/mL   Urinalysis With Microscopic If Indicated (No Culture) - Urine, Clean Catch    Collection Time: 06/01/24  5:49 PM    Specimen: Urine, Clean Catch   Result Value Ref Range    Color, UA Yellow Yellow, Straw    Appearance, UA Clear Clear    pH, UA 5.5 5.0 - 8.0    Specific Gravity, UA 1.019 1.005 - 1.030    Glucose, UA Negative Negative    Ketones, UA Trace (A) Negative    Bilirubin, UA Negative Negative    Blood, UA Small (1+) (A) Negative    Protein, UA Negative Negative    Leuk Esterase, UA Small (1+) (A) Negative    Nitrite, UA Negative Negative    Urobilinogen, UA 0.2 E.U./dL 0.2 - 1.0 E.U./dL   Urinalysis, Microscopic Only - Urine, Clean Catch    Collection Time: 06/01/24  5:49 PM    Specimen: Urine, Clean Catch   Result Value Ref Range    RBC, UA 0-2 None Seen, 0-2 /HPF    WBC, UA 6-10 (A) None Seen, 0-2 /HPF    Bacteria, UA None Seen None Seen /HPF    Squamous Epithelial Cells, UA 0-2 None Seen, 0-2 /HPF    Hyaline Casts, UA 0-2 None Seen /LPF    Methodology Automated Microscopy        Ordered the above labs and independently reviewed the results.        RADIOLOGY  CT Abdomen Pelvis With Contrast    Result Date: 6/1/2024  CT ABDOMEN  PELVIS W CONTRAST-  HISTORY: 69 years of age, Male.  Pain located to his right inguinal area it radiates up to his left side and a little to his left flank and down to his left testicle.  History of diverticulitis.  No history of kidney stones in the past.  TECHNIQUE:  CT includes axial imaging from the lung bases to the trochanters with intravenous contrast and without use of oral contrast. Data reconstructed in coronal and sagittal planes. Radiation dose reduction techniques were utilized, including automated exposure control and exposure modulation based on body size.  COMPARISON: CT abdomen and pelvis 07/27/2022.  FINDINGS: Lung bases appear clear and there is no basilar effusion. Hepatic steatosis. 9 mm gallstone within mostly decompressed gallbladder. Normal splenic size. Adrenal glands, pancreas, right kidney within normal limits.  Punctate, 2 mm left ureterovesicular junction stone appears to crown into the urinary bladder. Mild left hydronephrosis. There is also left perinephric stranding and fluid.  Posterolateral left upper pole renal cyst measures 3.9 cm. Exophytic left lateral lower pole cyst measures 8.9 cm. Urinary bladder exhibits mild wall thickening and is mostly decompressed. Mild prostate gland enlargement. No ascites.  No bowel dilatation or evidence for bowel obstruction. Sigmoid diverticulosis without evidence for diverticulitis. Atherosclerotic calcifications are present involving the abdominal aorta and iliac vasculature.      1. 2 mm left ureterovesicular junction stone with mild left hydronephrosis. There is also left perinephric stranding and fluid due to obstruction or potential inflammation/upper tract infection. Thick-walled, low-volume urinary bladder. Mild prostate gland enlargement. 2. 2 left renal cysts with the larger measuring 8.9 cm. 3. Hepatic steatosis. 4. Cholelithiasis. 5. Sigmoid diverticulosis without evidence for diverticulitis.  Radiation dose reduction techniques were  utilized, including automated exposure control and exposure modulation based on body size.   This report was finalized on 6/1/2024 7:28 PM by Dr. Rosalio Cr M.D on Workstation: [id number]       I ordered the above noted radiological studies. Reviewed by me and discussed with radiologist.  See dictation for official radiology interpretation.      PROCEDURES    Procedures      MEDICATIONS GIVEN IN ER    Medications   sodium chloride 0.9 % flush 10 mL (has no administration in time range)   sodium chloride 0.9 % infusion (125 mL/hr Intravenous New Bag 6/1/24 5943)   cefTRIAXone (ROCEPHIN) 2,000 mg in sodium chloride 0.9 % 100 mL MBP (has no administration in time range)   sodium chloride 0.9 % bolus 1,000 mL (has no administration in time range)   sodium chloride 0.9 % flush 10 mL (has no administration in time range)   sodium chloride 0.9 % flush 10 mL (has no administration in time range)   sodium chloride 0.9 % infusion 40 mL (has no administration in time range)   ondansetron ODT (ZOFRAN-ODT) disintegrating tablet 4 mg (has no administration in time range)     Or   ondansetron (ZOFRAN) injection 4 mg (has no administration in time range)   nitroglycerin (NITROSTAT) SL tablet 0.4 mg (has no administration in time range)   Potassium Replacement - Follow Nurse / BPA Driven Protocol (has no administration in time range)   Magnesium Standard Dose Replacement - Follow Nurse / BPA Driven Protocol (has no administration in time range)   Phosphorus Replacement - Follow Nurse / BPA Driven Protocol (has no administration in time range)   Calcium Replacement - Follow Nurse / BPA Driven Protocol (has no administration in time range)   acetaminophen (TYLENOL) tablet 650 mg (has no administration in time range)   sennosides-docusate (PERICOLACE) 8.6-50 MG per tablet 2 tablet (has no administration in time range)     And   polyethylene glycol (MIRALAX) packet 17 g (has no administration in time range)     And   bisacodyl  (DULCOLAX) EC tablet 5 mg (has no administration in time range)     And   bisacodyl (DULCOLAX) suppository 10 mg (has no administration in time range)   tamsulosin (FLOMAX) 24 hr capsule 0.4 mg (has no administration in time range)   fentaNYL citrate (PF) (SUBLIMAZE) injection 50 mcg (50 mcg Intravenous Given 6/1/24 1751)   ondansetron (ZOFRAN) injection 4 mg (4 mg Intravenous Given 6/1/24 1751)   ketorolac (TORADOL) injection 15 mg (15 mg Intravenous Given 6/1/24 1751)   sodium chloride 0.9 % bolus 500 mL (0 mL Intravenous Stopped 6/1/24 1833)   iopamidol (ISOVUE-300) 61 % injection 100 mL (85 mL Intravenous Given 6/1/24 1846)   magnesium sulfate 2g/50 mL (PREMIX) infusion (2 g Intravenous New Bag 6/1/24 1923)   HYDROmorphone (DILAUDID) injection 1 mg (1 mg Intravenous Given 6/1/24 1923)         All labs have been independently reviewed by me.  All radiology studies have been reviewed by me and I discussed with radiologist dictating the report when indicated below.  All EKG's independently viewed and interpreted by me.  Discussion below represents my analysis of pertinent findings related to patient's condition, differential diagnosis, treatment plan and final disposition.        PROGRESS, DATA ANALYSIS, CONSULTS, AND MEDICAL DECISION MAKING    Differential diagnosis includes   - hepatobiliary pathology such as cholecystitis, cholangitis, and symptomatic cholelithiasis  -PUD  -Mesenteric ischemia  - Pancreatitis  - Dyspepsia  - Small bowel or large bowel obstruction  - Appendicitis  - Diverticulitis  - UTI including pyelonephritis  - Ureteral stone  - Zoster  - Colitis, including infectious and ischemic  - Atypical ACS  This gentleman is never had a kidney stone.  I do not see any obvious mass or swelling.  I do not anticipate this is a hernia.  I think it is very possible this gentleman has diverticulitis.  He is wanting something for pain.  I will order some pain medicine form we will do a CT scan of his abdomen  pelvis.  All questions answered at this time.      ED Course as of 06/01/24 1944   Sat Jun 01, 2024   8817 I reviewed the CT scan this gentleman had in November 2021.  He had sigmoid diverticulosis but no evidence of diverticulitis.  His urinary bladder wall was mildly thickened.  Had left renal cyst his aorta appeared unremarkable also showed evidence of mild hepatic steatosis and cholelithiasis. [MM]   1859 Magnesium(!): 1.2 [MM]   1908 I reviewed the CT scan report on this gentleman he has a 2 mm left ureter vesicular junction stone with mild left hydronephrosis but there is also perinephric stranding and fluid due to obstruction or potential inflammation in the upper tract of his urinary system.  There is 2 left renal cyst and hepatic ptosis as well as cholelithiasis.  There is diverticulosis but no signs of diverticulitis. [MM]   1908 WBC, UA(!): 6-10 [MM]   1908 Leukocytes, UA(!): Small (1+) [MM]   1908 Magnesium(!): 1.2 [MM]   1910 I have reassessed this gentleman.  He is having worsening pain.  Initially had some improvement but became much worse.  It is in the same distribution in his left inguinal to his left flank.  Informed him and his wife the results of the CT scan and lab work.  I did go ahead and give him some IV Dilaudid.  I will put a call out to urology.  There is concern on the CT scan that there is potential inflammatory changes and potential infection.  Will can also go ahead and put him on antibiotics. [MM]   1937 I discussed the case with Dr. Jannie sebastian who is on for urology.  He looked at the CT scan.  He thinks that this gentleman will very likely be able to pass the stone.  It is small and is distal.  Not 100% convinced that there is an acute infectious process.  But agrees with the plan of antibiotics.  Agrees with the plan for admission to observation. [MM]   1938 I did discuss the case with Ida Romano who is the observation unit provider.  Informed her that the patient's glucose is  mildly elevated he is a type II diabetic on metformin.  Informed her of my conversation with the urologist.  This gentleman has a mildly low CO2 at 21 and an anion gap of 17.  I do not believe this gentleman is in DKA. [MM]   1938   He again is a type II diabetic.  Will give him fluids and addressing his pain.  Will also give him IV antibiotics. [MM]   1938 I talked with the patient as well as spouse at length about the results of the test. [MM]      ED Course User Index  [MM] Paxton Zamora MD       AS OF 19:44 EDT VITALS:    BP - 161/83  HR - 74  TEMP - 97.5 °F (36.4 °C) (Tympanic)  02 SATS - 99%    SOCIAL DETERMINANTS OF HEALTH THAT IMPACT OR LIMIT CARE (For example..Homelessness,safe discharge, inability to obtain care, follow up, or prescriptions):      DIAGNOSIS  Final diagnoses:   Nephrolithiasis   Acute UTI   High anion gap metabolic acidosis         DISPOSITION  Patient admitted to a monitored bed in the observation unit.          DICTATED UTILIZING DRAGON DICTATION    Note Disclaimer: At [facility], we believe that sharing information builds trust and better relationships. You are receiving this note because you recently visited [facility]. It is possible you will see health information before a provider has talked with you about it. This kind of information can be easy to misunderstand. To help you fully understand what it means for your health, we urge you to discuss this note with your provider.       Paxton Zamora MD  06/01/24 1944

## 2024-06-01 NOTE — H&P
[facility]   HISTORY AND PHYSICAL    Patient Name: Shankar Daley  : 1955  MRN: [id number]  Primary Care Physician:  Brenda Erickson MD  Date of admission: 2024    Subjective   Subjective     Chief Complaint:   Chief Complaint   Patient presents with    Groin Pain     L         HPI:    Shankar Daley is a 69 y.o. male, with a past medical history including, but not limited to, asthma, diabetes, hypertension, and hyperlipidemia, presented to the emergency department with a complaint of pain in his left groin.  He states that the pain began this morning and radiates from his the left side of his abdomen to his left lower flank.  He states nothing makes it better and nothing makes it worse.  It has been accompanied with nausea, and vomiting.  He denies any fever, chills, or any urinary symptoms.  He denies ever having a kidney stone previously.  Urology has been consulted to see the patient in the AM.  We will keep him comfortable overnight and he will be n.p.o. after midnight.    Review of Systems   All systems were reviewed and negative except for: What was mentioned above in the HPI.    Personal History     Past Medical History:   Diagnosis Date    Asthma     Diabetes mellitus     Diverticulitis     Diverticulitis of colon     Hyperlipidemia     Hypertension        Past Surgical History:   Procedure Laterality Date    APPENDECTOMY      COLONOSCOPY N/A 2016    Procedure: COLONOSCOPY INTO CECUM/ TERMINAL ILEUM;  Surgeon: Shad Zambrano MD;  Location: [facility] ENDOSCOPY;  Service:     COLONOSCOPY N/A 3/7/2022    Procedure: COLONOSCOPY TO CECUM AND TO TI WITH COLD BIOPSIES;  Surgeon: Shad Zmabrano MD;  Location: [facility] ENDOSCOPY;  Service: Gastroenterology;  Laterality: N/A;  pre: history of diverticulitis and diarrhea  post: diverticulosis and hemorrhoids    ENDOSCOPY N/A 2024    Procedure: ESOPHAGOGASTRODUODENOSCOPY WITH COLD BIOPSIES AND BALLOON DILATATION AT 15, 16.5 AND 18;   Surgeon: Shad Zambrano MD;  Location: [facility] ENDOSCOPY;  Service: Gastroenterology;  Laterality: N/A;  PRE- DYSPHAGIA  POST- GASTRITIS, HIATAL HERNIA, SCHATZKIS RING       Family History: family history includes Alzheimer's disease in his mother; Diabetes in his brother; Hypertension in his brother, brother, and father. Otherwise pertinent FHx was reviewed and not pertinent to current issue.    Social History:  reports that he has never smoked. He has never used smokeless tobacco. He reports current alcohol use of about 105.0 standard drinks of alcohol per week. He reports that he does not use drugs.    Home Medications:  albuterol sulfate HFA, amLODIPine-valsartan, atorvastatin, budesonide-formoterol, ezetimibe, glucose blood, nortriptyline, and pantoprazole    Allergies:  Allergies   Allergen Reactions    Hydrochlorothiazide Rash       Objective   Objective     Vitals:   Temp:  [97.5 °F (36.4 °C)] 97.5 °F (36.4 °C)  Heart Rate:  [] 74  Resp:  [18] 18  BP: (161-177)/(83-87) 161/83  Physical Exam    Constitutional: Awake, alert   Eyes: PERRLA   HENT: NCAT, mucous membranes moist   Neck: Supple   Respiratory: Clear to auscultation bilaterally, nonlabored respirations    Cardiovascular: Regular rhythm   gastrointestinal: Positive bowel sounds, soft, generalized tenderness, nondistended   Musculoskeletal: No bilateral ankle edema   Psychiatric: Appropriate affect, cooperative   Neurologic: Oriented x 3, speech clear   Skin: No rashes     Result Review    Result Review:  I have personally reviewed the results from the time of this admission to 6/1/2024 19:41 EDT and agree with these findings:  [x]  Laboratory list / accordion  []  Microbiology  [x]  Radiology  []  EKG/Telemetry   []  Cardiology/Vascular   []  Pathology  [x]  Old records  []  Other:      Initial workup in the emergency department shows a CO2 of 21, anion gap at 17, glucose 183, magnesium 1.2, hemoglobin A1c 7.10, all other blood work is at  baseline for the patient.  Urinalysis shows a trace of ketones, small amount of blood, 1+ leukocytes, and 6-10 WBCs.  Urine culture and blood cultures are pending at this time.  CT abdomen pelvis with contrast shows 2 mm left ureterovesicular junction stone with mild left hydronephrosis.  There is also left Gordon phrenic stranding and fluid due to obstruction or potential inflammation/upper tract infection.  Thick walled, low volume urinary bladder.  Mild prostate gland enlargement.  2 left renal cyst with larger measuring 8.9 cm.  Hepatic steatosis, cholelithiasis, sigmoid diverticulosis without evidence for diverticulitis.    Assessment & Plan   Assessment / Plan     Brief Patient Summary:  Shankar Daley is a 69 y.o. male who was admitted to the observation unit for further evaluation and treatment and pain control of his kidney stone and UTI.    Active Hospital Problems:  Active Hospital Problems    Diagnosis     **Kidney stone      Plan:   Kidney stone/ UTI  -Urology consult  -Pain control  -Vital signs every 4 hours  -N.p.o. after midnight  -As needed antiemetic  -Flomax 0.4 mg po daily  -Strain all urine  -NS @ 125 ml/hr   -Rocephin 2 g IV every 24 hours    Diabetes  -Accu-Cheks AC  -Adult subcutaneous insulin management-low dose  -Hemoglobin A1c -7.10    DVT prophylaxis:  Mechanical DVT prophylaxis orders are present.        CODE STATUS:    Code Status (Patient has no pulse and is not breathing): CPR (Attempt to Resuscitate)  Medical Interventions (Patient has pulse or is breathing): Full Support    Admission Status:  I believe this patient meets observation status.    78 minutes have been spent by [facility] providers in the care of this patient while under observation status.      Appropriate PPE worn during patient encounter.  Hand hygeine performed before and after seeing the patient.      Electronically signed by CANDICE Pierson, 06/01/24, 7:41 PM EDT.

## 2024-06-01 NOTE — PROGRESS NOTES
Spoke to Dr. Zamora  Tiny 1-2 mm left UVJ stone that has nearly passed into the bladder  Hydration, strain urine  NPO after MN    Call ASAP with fever, other concerns

## 2024-06-02 VITALS
DIASTOLIC BLOOD PRESSURE: 82 MMHG | OXYGEN SATURATION: 97 % | SYSTOLIC BLOOD PRESSURE: 151 MMHG | HEIGHT: 69 IN | TEMPERATURE: 97.7 F | BODY MASS INDEX: 28.14 KG/M2 | WEIGHT: 190 LBS | HEART RATE: 62 BPM | RESPIRATION RATE: 18 BRPM

## 2024-06-02 LAB
ANION GAP SERPL CALCULATED.3IONS-SCNC: 10 MMOL/L (ref 5–15)
B-OH-BUTYR SERPL-SCNC: 0.18 MMOL/L (ref 0.02–0.27)
BUN SERPL-MCNC: 19 MG/DL (ref 8–23)
BUN/CREAT SERPL: 15.8 (ref 7–25)
CALCIUM SPEC-SCNC: 8 MG/DL (ref 8.6–10.5)
CHLORIDE SERPL-SCNC: 108 MMOL/L (ref 98–107)
CO2 SERPL-SCNC: 21 MMOL/L (ref 22–29)
CREAT SERPL-MCNC: 1.2 MG/DL (ref 0.76–1.27)
DEPRECATED RDW RBC AUTO: 42.2 FL (ref 37–54)
EGFRCR SERPLBLD CKD-EPI 2021: 65.1 ML/MIN/1.73
ERYTHROCYTE [DISTWIDTH] IN BLOOD BY AUTOMATED COUNT: 12.1 % (ref 12.3–15.4)
GLUCOSE SERPL-MCNC: 180 MG/DL (ref 65–99)
HCT VFR BLD AUTO: 38.4 % (ref 37.5–51)
HGB BLD-MCNC: 12.6 G/DL (ref 13–17.7)
MCH RBC QN AUTO: 31.4 PG (ref 26.6–33)
MCHC RBC AUTO-ENTMCNC: 32.8 G/DL (ref 31.5–35.7)
MCV RBC AUTO: 95.8 FL (ref 79–97)
PLATELET # BLD AUTO: 184 10*3/MM3 (ref 140–450)
PMV BLD AUTO: 10 FL (ref 6–12)
POTASSIUM SERPL-SCNC: 5 MMOL/L (ref 3.5–5.2)
RBC # BLD AUTO: 4.01 10*6/MM3 (ref 4.14–5.8)
SODIUM SERPL-SCNC: 139 MMOL/L (ref 136–145)
WBC NRBC COR # BLD AUTO: 7.76 10*3/MM3 (ref 3.4–10.8)

## 2024-06-02 PROCEDURE — 96361 HYDRATE IV INFUSION ADD-ON: CPT

## 2024-06-02 PROCEDURE — G0378 HOSPITAL OBSERVATION PER HR: HCPCS

## 2024-06-02 PROCEDURE — 82010 KETONE BODYS QUAN: CPT

## 2024-06-02 PROCEDURE — 80048 BASIC METABOLIC PNL TOTAL CA: CPT

## 2024-06-02 PROCEDURE — 25810000003 SODIUM CHLORIDE 0.9 % SOLUTION: Performed by: EMERGENCY MEDICINE

## 2024-06-02 PROCEDURE — 85027 COMPLETE CBC AUTOMATED: CPT

## 2024-06-02 RX ORDER — ONDANSETRON 4 MG/1
4 TABLET, ORALLY DISINTEGRATING ORAL EVERY 8 HOURS PRN
Qty: 20 TABLET | Refills: 0 | Status: SHIPPED | OUTPATIENT
Start: 2024-06-02

## 2024-06-02 RX ORDER — HYDROCODONE BITARTRATE AND ACETAMINOPHEN 5; 325 MG/1; MG/1
1 TABLET ORAL EVERY 6 HOURS PRN
Qty: 10 TABLET | Refills: 0 | Status: SHIPPED | OUTPATIENT
Start: 2024-06-02

## 2024-06-02 RX ORDER — IBUPROFEN 600 MG/1
600 TABLET ORAL EVERY 6 HOURS PRN
Qty: 21 TABLET | Refills: 0 | Status: SHIPPED | OUTPATIENT
Start: 2024-06-02

## 2024-06-02 RX ADMIN — OXYCODONE AND ACETAMINOPHEN 1 TABLET: 7.5; 325 TABLET ORAL at 08:47

## 2024-06-02 RX ADMIN — TAMSULOSIN HYDROCHLORIDE 0.4 MG: 0.4 CAPSULE ORAL at 08:36

## 2024-06-02 RX ADMIN — ATORVASTATIN CALCIUM 40 MG: 20 TABLET, FILM COATED ORAL at 08:36

## 2024-06-02 RX ADMIN — CLONIDINE HYDROCHLORIDE 0.4 MG: 0.1 TABLET ORAL at 08:36

## 2024-06-02 RX ADMIN — PANTOPRAZOLE SODIUM 40 MG: 40 TABLET, DELAYED RELEASE ORAL at 08:36

## 2024-06-02 RX ADMIN — AMLODIPINE BESYLATE 10 MG: 10 TABLET ORAL at 08:36

## 2024-06-02 RX ADMIN — VALSARTAN 320 MG: 320 TABLET, FILM COATED ORAL at 08:36

## 2024-06-02 RX ADMIN — SODIUM CHLORIDE 125 ML/HR: 9 INJECTION, SOLUTION INTRAVENOUS at 05:51

## 2024-06-02 NOTE — DISCHARGE SUMMARY
ED OBSERVATION PROGRESS/DISCHARGE SUMMARY    Date of Admission: 6/1/2024   LOS: 0 days   PCP: Brenda Erickson MD    Final Diagnosis: Left ureteral stone    Hospital Outcome:     Shankar Daley was admitted to the observation unit for further evaluation and due to a left ureteral stone.  Urology was consulted.  His symptoms are significantly improved this morning, however he has not passed the stone yet.  He can be discharged home, no operative intervention required.  He should continue to strain his urine at home.  He can take ibuprofen 600 mg 3 times daily for 1 week.  Will also send hydrocodone and Zofran as needed.  He will follow-up with urology in the office in 2 weeks for another CT scan.  He will be discharged home.    ROS:  General: no fevers, chills  Respiratory: no cough, dyspnea  Cardiovascular: no chest pain, palpitations  Abdomen: No abdominal pain, nausea, vomiting, or diarrhea  Neurologic: No focal weakness    Objective   Physical Exam:  I have reviewed the vital signs.  Temp:  [97.5 °F (36.4 °C)-98.2 °F (36.8 °C)] 97.7 °F (36.5 °C)  Heart Rate:  [] 62  Resp:  [18] 18  BP: (138-177)/(74-87) 151/82  General Appearance:    Alert, cooperative, no distress  Head:    Normocephalic, atraumatic  Eyes:    Sclerae anicteric  Neck:   Supple, no mass  Lungs: Clear to auscultation bilaterally, respirations unlabored  Heart: Regular rate and rhythm, S1 and S2 normal, no murmur, rub or gallop  Abdomen:  Soft, non-tender, bowel sounds active, nondistended  Extremities: No clubbing, cyanosis, or edema to lower extremities  Pulses:  2+ and symmetric in distal lower extremities  Skin: No rashes   Neurologic: Oriented x3, Normal strength to extremities    Results Review:    I have reviewed the labs, radiology results and diagnostic studies.    Results from last 7 days   Lab Units 06/02/24  0412   WBC 10*3/mm3 7.76   HEMOGLOBIN g/dL 12.6*   HEMATOCRIT % 38.4   PLATELETS 10*3/mm3 184     Results from last 7 days    Lab Units 06/02/24  0412 06/01/24  1746   SODIUM mmol/L 139 143   POTASSIUM mmol/L 5.0 4.4   CHLORIDE mmol/L 108* 105   CO2 mmol/L 21.0* 21.0*   BUN mg/dL 19 20   CREATININE mg/dL 1.20 1.09   CALCIUM mg/dL 8.0* 9.6   BILIRUBIN mg/dL  --  0.4   ALK PHOS U/L  --  78   ALT (SGPT) U/L  --  35   AST (SGOT) U/L  --  23   GLUCOSE mg/dL 180* 183*     Imaging Results (Last 24 Hours)       Procedure Component Value Units Date/Time    CT Abdomen Pelvis With Contrast [[id number]] Collected: 06/01/24 1901     Updated: 06/01/24 1931    Narrative:      CT ABDOMEN PELVIS W CONTRAST-     HISTORY: 69 years of age, Male.  Pain located to his right inguinal area  it radiates up to his left side and a little to his left flank and down  to his left testicle.  History of diverticulitis.  No history of kidney  stones in the past.     TECHNIQUE:  CT includes axial imaging from the lung bases to the  trochanters with intravenous contrast and without use of oral contrast.  Data reconstructed in coronal and sagittal planes. Radiation dose  reduction techniques were utilized, including automated exposure control  and exposure modulation based on body size.     COMPARISON: CT abdomen and pelvis 07/27/2022.     FINDINGS: Lung bases appear clear and there is no basilar effusion.  Hepatic steatosis. 9 mm gallstone within mostly decompressed  gallbladder. Normal splenic size. Adrenal glands, pancreas, right kidney  within normal limits.     Punctate, 2 mm left ureterovesicular junction stone appears to crown  into the urinary bladder. Mild left hydronephrosis. There is also left  perinephric stranding and fluid.     Posterolateral left upper pole renal cyst measures 3.9 cm. Exophytic  left lateral lower pole cyst measures 8.9 cm. Urinary bladder exhibits  mild wall thickening and is mostly decompressed. Mild prostate gland  enlargement. No ascites.     No bowel dilatation or evidence for bowel obstruction. Sigmoid  diverticulosis without evidence  for diverticulitis. Atherosclerotic  calcifications are present involving the abdominal aorta and iliac  vasculature.       Impression:      1. 2 mm left ureterovesicular junction stone with mild left  hydronephrosis. There is also left perinephric stranding and fluid due  to obstruction or potential inflammation/upper tract infection.  Thick-walled, low-volume urinary bladder. Mild prostate gland  enlargement.  2. 2 left renal cysts with the larger measuring 8.9 cm.  3. Hepatic steatosis.  4. Cholelithiasis.  5. Sigmoid diverticulosis without evidence for diverticulitis.     Radiation dose reduction techniques were utilized, including automated  exposure control and exposure modulation based on body size.        This report was finalized on 6/1/2024 7:28 PM by Dr. Rosalio Cr M.D on Workstation: [id number]               I have reviewed the medications.  ---------------------------------------------------------------------------------------------  Assessment & Plan   Assessment/Problem List    Kidney stone    Plan:    Kidney stone/ UTI  -CT scan showed 2 mm left ureterovesicular junction stone with mild left hydronephrosis, left perinephric stranding and fluid noted as well.  -Urology consulted  -No operative intervention required  -Continue to strain urine at home  -Ibuprofen 600 mg 3 times daily x 1 week  -Norco and Zofran as needed  -Encourage fluid intake  -Follow-up with urology in 2 weeks for pelvic CT     Diabetes  -Continue home regimen  -Hemoglobin A1c -7.10     DVT prophylaxis:  Mechanical DVT prophylaxis orders are present.     Disposition: Home    Follow-up after Discharge: Follow-up with your primary care provider in 1 to 2 weeks.  Follow-up with urology in 2 weeks.    This note will serve as a discharge summary    Kenia Garrett, APRN 06/02/24 11:10 EDT    I have worn appropriate PPE during this patient encounter, sanitized my hands both with entering and exiting patient's room.    32 minutes has  been spent by [facility] providers in the care of this patient while under observation status

## 2024-06-02 NOTE — PLAN OF CARE
Goal Outcome Evaluation:  Plan of Care Reviewed With: patient           Outcome Evaluation: oriented, c/o pain, IVF @125, and NPO since midnight w/consults to urology.

## 2024-06-02 NOTE — CONSULTS
FIRST UROLOGY CONSULT      Patient Identification:  NAME:  Shankar Daley  Age:  69 y.o.   Sex:  male   :  1955   MRN:  [id number]     Chief complaint:  Left groin pain    History of present illness:      68 yo male with acute onset of left flank pain with radiation to left groin x 24 hours.  Assoc with n/v, no prior h/o stones.  No fever, no GH, no dysuria.    In hospital:  -AVSS, good UOP  -WBC - 7-8 K  -Creat - 1.1 - 1.2  -UA - Small ignacio ase, neg nitrite, 0-2 RBCs, 6-10 WBCs, no bact  -Lactate - 1.5    -CT - 2 mm left UVJ stone  at the UO, left quita-nephric stranding, pair of left renal cysts, mild bladder wall thickening and mild BPH    Complete resolution of pain overnight, has not collected a stone however  No bladder level symptoms either     Asked to see    Past medical history:  Past Medical History:   Diagnosis Date    Asthma     Diabetes mellitus     Diverticulitis     Diverticulitis of colon     Hyperlipidemia     Hypertension        Past surgical history:  Past Surgical History:   Procedure Laterality Date    APPENDECTOMY      COLONOSCOPY N/A 2016    Procedure: COLONOSCOPY INTO CECUM/ TERMINAL ILEUM;  Surgeon: Shad Zambrano MD;  Location: [facility] ENDOSCOPY;  Service:     COLONOSCOPY N/A 3/7/2022    Procedure: COLONOSCOPY TO CECUM AND TO TI WITH COLD BIOPSIES;  Surgeon: Shad Zambrano MD;  Location: [facility] ENDOSCOPY;  Service: Gastroenterology;  Laterality: N/A;  pre: history of diverticulitis and diarrhea  post: diverticulosis and hemorrhoids    ENDOSCOPY N/A 2024    Procedure: ESOPHAGOGASTRODUODENOSCOPY WITH COLD BIOPSIES AND BALLOON DILATATION AT 15, 16.5 AND 18;  Surgeon: Shad Zambrano MD;  Location: [facility] ENDOSCOPY;  Service: Gastroenterology;  Laterality: N/A;  PRE- DYSPHAGIA  POST- GASTRITIS, HIATAL HERNIA, SCHATZKIS RING       Allergies:  Hydrochlorothiazide    Home medications:  Medications Prior to Admission   Medication Sig Dispense Refill Last  Dose    cloNIDine (CATAPRES) 0.2 MG tablet Take 2 tablets by mouth Daily.   Patient Taking Differently    Accu-Chek Guide test strip        albuterol (PROAIR HFA) 108 (90 Base) MCG/ACT inhaler Inhale 2 puffs Every 6 (Six) Hours As Needed for Wheezing. 5 inhaler 3     amLODIPine-valsartan (EXFORGE)  MG per tablet Daily.       atorvastatin (LIPITOR) 40 MG tablet TAKE 1 TABLET DAILY 90 tablet 1     Bioflavonoid Products (BIOFLEX PO) Take  by mouth.       budesonide-formoterol (SYMBICORT) 160-4.5 MCG/ACT inhaler Inhale 2 puffs As Needed.       cloNIDine (CATAPRES) 0.2 MG tablet Take 1 tablet by mouth Every Night.       ezetimibe (ZETIA) 10 MG tablet Take 1 tablet by mouth Daily.       metFORMIN (GLUCOPHAGE) 500 MG tablet Take 2 tablets by mouth Daily.       metFORMIN (GLUCOPHAGE) 500 MG tablet Take 1 tablet by mouth Every Night.       multivitamin with minerals (MULTIVITAL PO) Take 1 tablet by mouth Daily. One a Day       nortriptyline (PAMELOR) 10 MG capsule Take 1 capsule by mouth Every Night. 30 capsule 11     pantoprazole (PROTONIX) 40 MG EC tablet Take 1 tablet by mouth Daily. 90 tablet 3         Hospital medications:  amLODIPine, 10 mg, Oral, Q24H   And  valsartan, 320 mg, Oral, Q24H  atorvastatin, 40 mg, Oral, Daily  cloNIDine, 0.2 mg, Oral, Nightly  cloNIDine, 0.4 mg, Oral, Daily  insulin lispro, 2-7 Units, Subcutaneous, 4x Daily AC & at Bedtime  nortriptyline, 10 mg, Oral, Nightly  pantoprazole, 40 mg, Oral, Daily  senna-docusate sodium, 2 tablet, Oral, BID  sodium chloride, 10 mL, Intravenous, Q12H  tamsulosin, 0.4 mg, Oral, Daily      sodium chloride, 125 mL/hr, Last Rate: 125 mL/hr (06/02/24 0551)        acetaminophen    senna-docusate sodium **AND** polyethylene glycol **AND** bisacodyl **AND** bisacodyl    Calcium Replacement - Follow Nurse / BPA Driven Protocol    dextrose    dextrose    glucagon (human recombinant)    HYDROmorphone    Magnesium Standard Dose Replacement - Follow Nurse / BPA Driven  Protocol    nitroglycerin    ondansetron ODT **OR** ondansetron    oxyCODONE-acetaminophen    Phosphorus Replacement - Follow Nurse / BPA Driven Protocol    Potassium Replacement - Follow Nurse / BPA Driven Protocol    [COMPLETED] Insert Peripheral IV **AND** sodium chloride    sodium chloride    sodium chloride    Family history:  Family History   Problem Relation Age of Onset    Hypertension Father     Alzheimer's disease Mother     Hypertension Brother     Diabetes Brother     Hypertension Brother        Social history:  Social History     Tobacco Use    Smoking status: Never    Smokeless tobacco: Never   Vaping Use    Vaping status: Never Used   Substance Use Topics    Alcohol use: Yes     Alcohol/week: 105.0 standard drinks of alcohol     Types: 105 Shots of liquor per week     Comment: has 2-3 5oz glasses of whiskey daily    Drug use: No       Review of systems:      Positive for:  nothing  Negative for:  chest pain, cough, sob, o/w neg    Objective:  TMax 24 hours:   Temp (24hrs), Av.9 °F (36.6 °C), Min:97.5 °F (36.4 °C), Max:98.2 °F (36.8 °C)      Vitals Ranges:   Temp:  [97.5 °F (36.4 °C)-98.2 °F (36.8 °C)] 98.2 °F (36.8 °C)  Heart Rate:  [] 82  Resp:  [18] 18  BP: (138-177)/(74-87) 138/79    Intake/Output Last 3 shifts:  I/O last 3 completed shifts:  In: 1500 [IV Piggyback:1500]  Out: 650 [Urine:650]     Physical Exam:    General Appearance:    Alert, cooperative, NAD   Back:     No CVA tenderness   Lungs:     Respirations unlabored, no wheezing    Heart:    RRR, intact peripheral pulses   Abdomen:     Soft, ND, minimal LLQ tenderness perhaps   Neuro/Psych:   Orientation intact, mood/affect pleasant       Results review:   I reviewed the patient's new clinical results.    Data review:  Lab Results (last 24 hours)       Procedure Component Value Units Date/Time    Urine Culture - Urine, Urine, Clean Catch [[id number]] Collected: 24 174    Specimen: Urine, Clean Catch Updated: 24 0616     Basic Metabolic Panel [[id number]]  (Abnormal) Collected: 06/02/24 0412    Specimen: Blood from Arm, Right Updated: 06/02/24 0444     Glucose 180 mg/dL      BUN 19 mg/dL      Creatinine 1.20 mg/dL      Sodium 139 mmol/L      Potassium 5.0 mmol/L      Chloride 108 mmol/L      CO2 21.0 mmol/L      Calcium 8.0 mg/dL      BUN/Creatinine Ratio 15.8     Anion Gap 10.0 mmol/L      eGFR 65.1 mL/min/1.73     Narrative:      GFR Normal >60  Chronic Kidney Disease <60  Kidney Failure <15      Beta Hydroxybutyrate Quantitative [[id number]]  (Normal) Collected: 06/02/24 0412    Specimen: Blood from Arm, Right Updated: 06/02/24 0444     Beta-Hydroxybutyrate Quant 0.178 mmol/L     Narrative:      In the assessment of possible diabetic ketoacidosis, the test should be interpreted along with other clinical and laboratory findings.  A level greater than 1 mmol/L should require further evaluation and levels of more than 3 mmol/L require immediate medical review.    CBC (No Diff) [[id number]]  (Abnormal) Collected: 06/02/24 0412    Specimen: Blood from Arm, Right Updated: 06/02/24 0425     WBC 7.76 10*3/mm3      RBC 4.01 10*6/mm3      Hemoglobin 12.6 g/dL      Hematocrit 38.4 %      MCV 95.8 fL      MCH 31.4 pg      MCHC 32.8 g/dL      RDW 12.1 %      RDW-SD 42.2 fl      MPV 10.0 fL      Platelets 184 10*3/mm3     Ketone Bodies, Serum (Not performed at [facility]) [[id number]]  (Abnormal) Collected: 06/01/24 1746    Specimen: Blood Updated: 06/01/24 2157    Narrative:      The following orders were created for panel order Ketone Bodies, Serum (Not performed at [facility]).  Procedure                               Abnormality         Status                     ---------                               -----------         ------                     Beta Hydroxybutyrate Cb...[[id number]]  Abnormal            Final result                 Please view results for these tests on the individual orders.    Beta Hydroxybutyrate Quantitative [[id number]]   (Abnormal) Collected: 06/01/24 1746    Specimen: Blood Updated: 06/01/24 2157     Beta-Hydroxybutyrate Quant 0.293 mmol/L     Narrative:      In the assessment of possible diabetic ketoacidosis, the test should be interpreted along with other clinical and laboratory findings.  A level greater than 1 mmol/L should require further evaluation and levels of more than 3 mmol/L require immediate medical review.    POC Glucose Once [[id number]]  (Abnormal) Collected: 06/01/24 2152    Specimen: Blood Updated: 06/01/24 2153     Glucose 214 mg/dL     Lactic Acid, Plasma [[id number]]  (Normal) Collected: 06/01/24 1941    Specimen: Blood Updated: 06/01/24 2009     Lactate 1.5 mmol/L     Hemoglobin A1c [[id number]]  (Abnormal) Collected: 06/01/24 1746    Specimen: Blood Updated: 06/01/24 2004     Hemoglobin A1C 7.10 %     Narrative:      Hemoglobin A1C Ranges:    Increased Risk for Diabetes  5.7% to 6.4%  Diabetes                     >= 6.5%  Diabetic Goal                < 7.0%    Blood Culture - Blood, Arm, Left [[id number]] Collected: 06/01/24 1928    Specimen: Blood from Arm, Left Updated: 06/01/24 1948    Blood Culture - Blood, Arm, Left [[id number]] Collected: 06/01/24 1941    Specimen: Blood from Arm, Left Updated: 06/01/24 1948    Procalcitonin [[id number]]  (Normal) Collected: 06/01/24 1746    Specimen: Blood Updated: 06/01/24 1940     Procalcitonin 0.09 ng/mL     Narrative:      As a Marker for Sepsis (Non-Neonates):    1. <0.5 ng/mL represents a low risk of severe sepsis and/or septic shock.  2. >2 ng/mL represents a high risk of severe sepsis and/or septic shock.    As a Marker for Lower Respiratory Tract Infections that require antibiotic therapy:    PCT on Admission    Antibiotic Therapy       6-12 Hrs later    >0.5                Strongly Recommended  >0.25 - <0.5        Recommended   0.1 - 0.25          Discouraged              Remeasure/reassess PCT  <0.1                Strongly Discouraged     Remeasure/reassess  "PCT    As 28 day mortality risk marker: \"Change in Procalcitonin Result\" (>80% or <=80%) if Day 0 (or Day 1) and Day 4 values are available. Refer to http://www.[facility]-pct-calculator.com    Change in PCT <=80%  A decrease of PCT levels below or equal to 80% defines a positive change in PCT test result representing a higher risk for 28-day all-cause mortality of patients diagnosed with severe sepsis for septic shock.    Change in PCT >80%  A decrease of PCT levels of more than 80% defines a negative change in PCT result representing a lower risk for 28-day all-cause mortality of patients diagnosed with severe sepsis or septic shock.       Lipase [[id number]]  (Normal) Collected: 06/01/24 1746    Specimen: Blood Updated: 06/01/24 1818     Lipase 55 U/L     Magnesium [[id number]]  (Abnormal) Collected: 06/01/24 1746    Specimen: Blood Updated: 06/01/24 1818     Magnesium 1.2 mg/dL     Comprehensive Metabolic Panel [[id number]]  (Abnormal) Collected: 06/01/24 1746    Specimen: Blood Updated: 06/01/24 1818     Glucose 183 mg/dL      BUN 20 mg/dL      Creatinine 1.09 mg/dL      Sodium 143 mmol/L      Potassium 4.4 mmol/L      Chloride 105 mmol/L      CO2 21.0 mmol/L      Calcium 9.6 mg/dL      Total Protein 7.3 g/dL      Albumin 4.9 g/dL      ALT (SGPT) 35 U/L      AST (SGOT) 23 U/L      Alkaline Phosphatase 78 U/L      Total Bilirubin 0.4 mg/dL      Globulin 2.4 gm/dL      A/G Ratio 2.0 g/dL      BUN/Creatinine Ratio 18.3     Anion Gap 17.0 mmol/L      eGFR 73.0 mL/min/1.73     Narrative:      GFR Normal >60  Chronic Kidney Disease <60  Kidney Failure <15      Protime-INR [[id number]]  (Normal) Collected: 06/01/24 1746    Specimen: Blood Updated: 06/01/24 1817     Protime 13.8 Seconds      INR 1.04    Urinalysis With Microscopic If Indicated (No Culture) - Urine, Clean Catch [[id number]]  (Abnormal) Collected: 06/01/24 1749    Specimen: Urine, Clean Catch Updated: 06/01/24 1807     Color, UA Yellow     Appearance, UA Clear    "  pH, UA 5.5     Specific Gravity, UA 1.019     Glucose, UA Negative     Ketones, UA Trace     Bilirubin, UA Negative     Blood, UA Small (1+)     Protein, UA Negative     Leuk Esterase, UA Small (1+)     Nitrite, UA Negative     Urobilinogen, UA 0.2 E.U./dL    Urinalysis, Microscopic Only - Urine, Clean Catch [[id number]]  (Abnormal) Collected: 06/01/24 1749    Specimen: Urine, Clean Catch Updated: 06/01/24 1803     RBC, UA 0-2 /HPF      WBC, UA 6-10 /HPF      Bacteria, UA None Seen /HPF      Squamous Epithelial Cells, UA 0-2 /HPF      Hyaline Casts, UA 0-2 /LPF      Methodology Automated Microscopy    CBC & Differential [[id number]]  (Abnormal) Collected: 06/01/24 1746    Specimen: Blood Updated: 06/01/24 1800    Narrative:      The following orders were created for panel order CBC & Differential.  Procedure                               Abnormality         Status                     ---------                               -----------         ------                     CBC Auto Differential[[id number]]        Abnormal            Final result                 Please view results for these tests on the individual orders.    CBC Auto Differential [[id number]]  (Abnormal) Collected: 06/01/24 1746    Specimen: Blood Updated: 06/01/24 1800     WBC 7.12 10*3/mm3      RBC 4.51 10*6/mm3      Hemoglobin 14.6 g/dL      Hematocrit 42.2 %      MCV 93.6 fL      MCH 32.4 pg      MCHC 34.6 g/dL      RDW 11.8 %      RDW-SD 40.2 fl      MPV 9.8 fL      Platelets 211 10*3/mm3      Neutrophil % 56.8 %      Lymphocyte % 25.7 %      Monocyte % 11.4 %      Eosinophil % 2.9 %      Basophil % 1.0 %      Immature Grans % 2.2 %      Neutrophils, Absolute 4.04 10*3/mm3      Lymphocytes, Absolute 1.83 10*3/mm3      Monocytes, Absolute 0.81 10*3/mm3      Eosinophils, Absolute 0.21 10*3/mm3      Basophils, Absolute 0.07 10*3/mm3      Immature Grans, Absolute 0.16 10*3/mm3      nRBC 0.0 /100 WBC              Imaging:  Imaging Results (Last 24 Hours)        Procedure Component Value Units Date/Time    CT Abdomen Pelvis With Contrast [[id number]] Collected: 06/01/24 1901     Updated: 06/01/24 1931    Narrative:      CT ABDOMEN PELVIS W CONTRAST-     HISTORY: 69 years of age, Male.  Pain located to his right inguinal area  it radiates up to his left side and a little to his left flank and down  to his left testicle.  History of diverticulitis.  No history of kidney  stones in the past.     TECHNIQUE:  CT includes axial imaging from the lung bases to the  trochanters with intravenous contrast and without use of oral contrast.  Data reconstructed in coronal and sagittal planes. Radiation dose  reduction techniques were utilized, including automated exposure control  and exposure modulation based on body size.     COMPARISON: CT abdomen and pelvis 07/27/2022.     FINDINGS: Lung bases appear clear and there is no basilar effusion.  Hepatic steatosis. 9 mm gallstone within mostly decompressed  gallbladder. Normal splenic size. Adrenal glands, pancreas, right kidney  within normal limits.     Punctate, 2 mm left ureterovesicular junction stone appears to crown  into the urinary bladder. Mild left hydronephrosis. There is also left  perinephric stranding and fluid.     Posterolateral left upper pole renal cyst measures 3.9 cm. Exophytic  left lateral lower pole cyst measures 8.9 cm. Urinary bladder exhibits  mild wall thickening and is mostly decompressed. Mild prostate gland  enlargement. No ascites.     No bowel dilatation or evidence for bowel obstruction. Sigmoid  diverticulosis without evidence for diverticulitis. Atherosclerotic  calcifications are present involving the abdominal aorta and iliac  vasculature.       Impression:      1. 2 mm left ureterovesicular junction stone with mild left  hydronephrosis. There is also left perinephric stranding and fluid due  to obstruction or potential inflammation/upper tract infection.  Thick-walled, low-volume urinary bladder.  Mild prostate gland  enlargement.  2. 2 left renal cysts with the larger measuring 8.9 cm.  3. Hepatic steatosis.  4. Cholelithiasis.  5. Sigmoid diverticulosis without evidence for diverticulitis.     Radiation dose reduction techniques were utilized, including automated  exposure control and exposure modulation based on body size.        This report was finalized on 6/1/2024 7:28 PM by Dr. Rosalio Cr M.D on Workstation: [id number]                  Assessment:     Left ureteral stone    Plan:     Discussed options - MET vs operative intervention  Will proceed with DC home today, no operative intervention required  Strain urine at home  Home with ibuprofen 600 TID x one week, narcotics as needed  Push fluids  Discussed how to prevent future stones    OV with me in 2 weeks with pelvis CT to determine passage  Call if fever, n/v, worsening symptoms - card given    Jose Covarrubias MD  06/02/24  07:10 EDT

## 2024-06-02 NOTE — DISCHARGE INSTRUCTIONS
Follow-up with your primary care doctor in 1 to 2 weeks    Follow-up with urology in 2 weeks for repeat imaging.    Continue to strain urine  Drink plenty of water   Call urology for any fever, chills, nausea, vomiting or worsening symptoms

## 2024-06-02 NOTE — PLAN OF CARE
Goal Outcome Evaluation:   Pt is being discharged. Pain is ongoing. Pt going home on hydrocodone, ibuprofen, zofran. Pt to follow up with PCP in 1 week, as well as urology in in 2 weeks. Pt received card from urology to call if symptoms worsen. Pt and wife instructed to strain urine and states she knows what to look for, urinal and strainer provided. Pt and family have no questions or concerns at this time.

## 2024-06-02 NOTE — PROGRESS NOTES
Pt admitted to the observation unit from the emergency department for acute onset left flank pain rating into the left groin.  No difficulty urinating, no fevers, no significant nausea or vomiting.  Pain improved currently.  No history of kidney stones.  Found to have a 2 mm stone in the left UVJ with mild hydronephrosis.     On exam,   General: No acute distress, nontoxic  HEENT: Mucous membranes moist, atraumatic, EOMI  Neck: Full ROM  Pulm: Symmetric chest rise, nonlabored, lungs CTAB  Cardiovascular: Regular rate and rhythm, intact distal pulses  GI: Soft, nontender, nondistended, no rebound, no guarding, bowel sounds present  MSK: Full ROM, no deformity  Skin: Warm, dry  Neuro: Awake, alert, oriented x 4, GCS 15, moving all extremities, no focal deficits  Psych: Calm, cooperative          Plan: 2 mm left UVJ stone with mild hydronephrosis.  Pain improved.  No signs of UTI.  Ongoing pain control overnight, urology consulted.  All questions or concerns addressed.  No need for antibiotics at this time.  Normal renal function.           MD Attestation Note    SHARED VISIT: This visit was performed by BOTH a physician and an APC. The substantive portion of the medical decision making was performed by this attesting physician who made or approved the management plan and takes responsibility for patient management. All studies in the APC note (if performed) were independently interpreted by me.

## 2024-06-02 NOTE — PROGRESS NOTES
QUINTIN LAGUNAS Attestation Note    I supervised care provided by the midlevel provider.    The CULLEN and I have discussed this patient's history, physical exam, and treatment plan. I have reviewed the documentation and personally had a face to face interaction with the patient  I affirm the documentation and agree with the treatment and plan. I provided a substantive portion of the care of this patient.  I personally performed the physical exam, in its entirety.  My personal findings are documented in below:    History:  69-year-old male admitted to the observation unit for further evaluation and management of left flank pain secondary to 2 mm stone in the left UVJ    Physical Exam:  General: No acute distress.  HENT: NCAT, PERRL, Nares patent.  Eyes: no scleral icterus.  Neck: trachea midline, no ROM limitations.  CV: Pink warm and well-perfused throughout  Respiratory: No distress or increased work of breathing  Abdomen: soft, no focal tenderness or rigidity  Musculoskeletal: no deformity.  Neuro: alert, moves all extremities, follows commands.  Skin: warm, dry.    Assessment and Plan:  69-year-old male admitted to the observation for further evaluation management of left flank pain secondary to 2 mm stone left UVJ  - Urology consulted and cleared for discharge home  - Symptoms improved this morning  - Patient received oxycodone and experiencing some dizziness  - Will send prescription for Norco to patient's preferred pharmacy  - PDMP checked and no chronic narcotic prescriptions noted  - Follow-up outpatient with urology

## 2024-06-03 LAB — BACTERIA SPEC AEROBE CULT: NO GROWTH

## 2024-06-06 LAB
BACTERIA SPEC AEROBE CULT: NORMAL
BACTERIA SPEC AEROBE CULT: NORMAL

## 2024-06-07 ENCOUNTER — TRANSCRIBE ORDERS (OUTPATIENT)
Dept: ADMINISTRATIVE | Facility: HOSPITAL | Age: 69
End: 2024-06-07
Payer: MEDICARE

## 2024-06-07 DIAGNOSIS — R91.8 LUNG MASS: Primary | ICD-10-CM

## 2024-06-29 ENCOUNTER — HOSPITAL ENCOUNTER (OUTPATIENT)
Facility: HOSPITAL | Age: 69
Discharge: HOME | End: 2024-06-29
Payer: MEDICARE

## 2024-06-29 DIAGNOSIS — R91.8 LUNG MASS: ICD-10-CM

## 2024-06-29 PROCEDURE — 71250 CT THORAX DX C-: CPT

## 2024-11-29 ENCOUNTER — PATIENT ROUNDING (BHMG ONLY) (OUTPATIENT)
Dept: URGENT CARE | Facility: CLINIC | Age: 69
End: 2024-11-29
Payer: MEDICARE

## 2024-11-29 NOTE — ED NOTES
Thank you for letting us care for you in your recent visit to our urgent care center. We would love to hear about your experience with us. Was this the first time you have visited our location?    We’re always looking for ways to make our patients’ experiences even better. Do you have any recommendations on ways we may improve?     I appreciate you taking the time to respond. Please be on the lookout for a survey about your recent visit from [employer] via text or email. We would greatly appreciate if you could fill that out and turn it back in. We want your voice to be heard and we value your feedback.   Thank you for choosing [facility] for your healthcare needs.

## 2025-03-11 ENCOUNTER — APPOINTMENT (OUTPATIENT)
Dept: CT IMAGING | Facility: HOSPITAL | Age: 70
End: 2025-03-11
Payer: MEDICARE

## 2025-03-11 ENCOUNTER — HOSPITAL ENCOUNTER (EMERGENCY)
Facility: HOSPITAL | Age: 70
Discharge: HOME | End: 2025-03-12
Attending: EMERGENCY MEDICINE
Payer: MEDICARE

## 2025-03-11 VITALS
OXYGEN SATURATION: 100 % | RESPIRATION RATE: 18 BRPM | TEMPERATURE: 97.8 F | BODY MASS INDEX: 27.2 KG/M2 | SYSTOLIC BLOOD PRESSURE: 175 MMHG | DIASTOLIC BLOOD PRESSURE: 95 MMHG | HEART RATE: 113 BPM | WEIGHT: 190 LBS | HEIGHT: 70 IN

## 2025-03-11 DIAGNOSIS — N20.0 KIDNEY STONE: Primary | ICD-10-CM

## 2025-03-11 LAB
ALBUMIN SERPL-MCNC: 4.9 G/DL (ref 3.5–5.2)
ALBUMIN/GLOB SERPL: 1.7 G/DL
ALP SERPL-CCNC: 73 U/L (ref 39–117)
ALT SERPL W P-5'-P-CCNC: 55 U/L (ref 1–41)
ANION GAP SERPL CALCULATED.3IONS-SCNC: 16 MMOL/L (ref 5–15)
AST SERPL-CCNC: 34 U/L (ref 1–40)
BASOPHILS # BLD AUTO: 0.07 10*3/MM3 (ref 0–0.2)
BASOPHILS NFR BLD AUTO: 1 % (ref 0–1.5)
BILIRUB SERPL-MCNC: 0.4 MG/DL (ref 0–1.2)
BUN SERPL-MCNC: 22 MG/DL (ref 8–23)
BUN/CREAT SERPL: 18.5 (ref 7–25)
CALCIUM SPEC-SCNC: 10 MG/DL (ref 8.6–10.5)
CHLORIDE SERPL-SCNC: 102 MMOL/L (ref 98–107)
CO2 SERPL-SCNC: 22 MMOL/L (ref 22–29)
CREAT SERPL-MCNC: 1.19 MG/DL (ref 0.76–1.27)
D-LACTATE SERPL-SCNC: 0.9 MMOL/L (ref 0.5–2)
D-LACTATE SERPL-SCNC: 2.3 MMOL/L (ref 0.5–2)
DEPRECATED RDW RBC AUTO: 41.2 FL (ref 37–54)
EGFRCR SERPLBLD CKD-EPI 2021: 65.7 ML/MIN/1.73
EOSINOPHIL # BLD AUTO: 0.11 10*3/MM3 (ref 0–0.4)
EOSINOPHIL NFR BLD AUTO: 1.5 % (ref 0.3–6.2)
ERYTHROCYTE [DISTWIDTH] IN BLOOD BY AUTOMATED COUNT: 12 % (ref 12.3–15.4)
GLOBULIN UR ELPH-MCNC: 2.9 GM/DL
GLUCOSE SERPL-MCNC: 179 MG/DL (ref 65–99)
HCT VFR BLD AUTO: 40.6 % (ref 37.5–51)
HGB BLD-MCNC: 13.8 G/DL (ref 13–17.7)
HOLD SPECIMEN: NORMAL
HOLD SPECIMEN: NORMAL
IMM GRANULOCYTES # BLD AUTO: 0.11 10*3/MM3 (ref 0–0.05)
IMM GRANULOCYTES NFR BLD AUTO: 1.5 % (ref 0–0.5)
LIPASE SERPL-CCNC: 75 U/L (ref 13–60)
LYMPHOCYTES # BLD AUTO: 1.89 10*3/MM3 (ref 0.7–3.1)
LYMPHOCYTES NFR BLD AUTO: 26.4 % (ref 19.6–45.3)
MCH RBC QN AUTO: 31.8 PG (ref 26.6–33)
MCHC RBC AUTO-ENTMCNC: 34 G/DL (ref 31.5–35.7)
MCV RBC AUTO: 93.5 FL (ref 79–97)
MONOCYTES # BLD AUTO: 0.81 10*3/MM3 (ref 0.1–0.9)
MONOCYTES NFR BLD AUTO: 11.3 % (ref 5–12)
NEUTROPHILS NFR BLD AUTO: 4.17 10*3/MM3 (ref 1.7–7)
NEUTROPHILS NFR BLD AUTO: 58.3 % (ref 42.7–76)
NRBC BLD AUTO-RTO: 0 /100 WBC (ref 0–0.2)
PLATELET # BLD AUTO: 246 10*3/MM3 (ref 140–450)
PMV BLD AUTO: 9.8 FL (ref 6–12)
POTASSIUM SERPL-SCNC: 4.7 MMOL/L (ref 3.5–5.2)
PROT SERPL-MCNC: 7.8 G/DL (ref 6–8.5)
RBC # BLD AUTO: 4.34 10*6/MM3 (ref 4.14–5.8)
SODIUM SERPL-SCNC: 140 MMOL/L (ref 136–145)
WBC NRBC COR # BLD AUTO: 7.16 10*3/MM3 (ref 3.4–10.8)
WHOLE BLOOD HOLD COAG: NORMAL
WHOLE BLOOD HOLD SPECIMEN: NORMAL

## 2025-03-11 PROCEDURE — 74176 CT ABD & PELVIS W/O CONTRAST: CPT

## 2025-03-11 PROCEDURE — 83605 ASSAY OF LACTIC ACID: CPT | Performed by: EMERGENCY MEDICINE

## 2025-03-11 PROCEDURE — 25010000002 KETOROLAC TROMETHAMINE PER 15 MG: Performed by: EMERGENCY MEDICINE

## 2025-03-11 PROCEDURE — 25010000002 HYDROMORPHONE 1 MG/ML SOLUTION: Performed by: EMERGENCY MEDICINE

## 2025-03-11 PROCEDURE — 80053 COMPREHEN METABOLIC PANEL: CPT

## 2025-03-11 PROCEDURE — 25810000003 SODIUM CHLORIDE 0.9 % SOLUTION: Performed by: EMERGENCY MEDICINE

## 2025-03-11 PROCEDURE — 96374 THER/PROPH/DIAG INJ IV PUSH: CPT

## 2025-03-11 PROCEDURE — 25010000002 ONDANSETRON PER 1 MG: Performed by: EMERGENCY MEDICINE

## 2025-03-11 PROCEDURE — 83605 ASSAY OF LACTIC ACID: CPT

## 2025-03-11 PROCEDURE — 99284 EMERGENCY DEPT VISIT MOD MDM: CPT

## 2025-03-11 PROCEDURE — 36415 COLL VENOUS BLD VENIPUNCTURE: CPT

## 2025-03-11 PROCEDURE — 96375 TX/PRO/DX INJ NEW DRUG ADDON: CPT

## 2025-03-11 PROCEDURE — 83690 ASSAY OF LIPASE: CPT

## 2025-03-11 PROCEDURE — 85025 COMPLETE CBC W/AUTO DIFF WBC: CPT

## 2025-03-11 RX ORDER — ONDANSETRON 2 MG/ML
4 INJECTION INTRAMUSCULAR; INTRAVENOUS ONCE
Status: COMPLETED | OUTPATIENT
Start: 2025-03-11 | End: 2025-03-11

## 2025-03-11 RX ORDER — SODIUM CHLORIDE 0.9 % (FLUSH) 0.9 %
10 SYRINGE (ML) INJECTION AS NEEDED
Status: DISCONTINUED | OUTPATIENT
Start: 2025-03-11 | End: 2025-03-12 | Stop reason: HOSPADM

## 2025-03-11 RX ORDER — KETOROLAC TROMETHAMINE 15 MG/ML
15 INJECTION, SOLUTION INTRAMUSCULAR; INTRAVENOUS ONCE
Status: COMPLETED | OUTPATIENT
Start: 2025-03-11 | End: 2025-03-11

## 2025-03-11 RX ADMIN — ONDANSETRON 4 MG: 2 INJECTION, SOLUTION INTRAMUSCULAR; INTRAVENOUS at 20:44

## 2025-03-11 RX ADMIN — KETOROLAC TROMETHAMINE 15 MG: 15 INJECTION, SOLUTION INTRAMUSCULAR; INTRAVENOUS at 21:32

## 2025-03-11 RX ADMIN — HYDROMORPHONE HYDROCHLORIDE 1 MG: 1 INJECTION, SOLUTION INTRAMUSCULAR; INTRAVENOUS; SUBCUTANEOUS at 20:44

## 2025-03-11 RX ADMIN — SODIUM CHLORIDE 500 ML: 9 INJECTION, SOLUTION INTRAVENOUS at 20:44

## 2025-03-12 NOTE — DISCHARGE INSTRUCTIONS
CT scan suggest that you did pass the kidney stone into the bladder and I do not think you should have much further trouble.  If you do have any discomfort you can take either Tylenol or ibuprofen help with the pain.  If you are able to capture the stone you may take it to see the urologist at your next appointment.

## 2025-03-12 NOTE — ED PROVIDER NOTES
EMERGENCY DEPARTMENT ENCOUNTER  Room Number:  HC1/E  PCP: Brenda Erickson MD  Independent Historians: Patient, wife at bedside      HPI:  Chief Complaint: had concerns including Groin Pain.     A complete HPI/ROS/PMH/PSH/SH/FH are unobtainable due to:   Chronic or social conditions impacting patient care (Social Determinants of Health):       Context: Shankar Daley is a 69 y.o. male with a medical history of diabetes, hypertension and hyperlipidemia who presents to the ED c/o acute left groin pain.  Pain began this afternoon.  Pain is pretty constant and worsened by nothing, improved by nothing.  Pain is severe at its worst.  Denies dysuria or hematuria.  Patient states pain is similar to kidney stone that he had about 9 months ago.  He states that he has had 1 kidney stone in the past and that it did pass spontaneously.  Prior abdominal surgeries include appendectomy when he was quite young.      Review of prior external notes (non-ED) -and- Review of prior external test results outside of this encounter:   I reviewed prior medical records note patient was hospitalized in June of last year with kidney stone.  He was treated with pain control and discharged home for outpatient management.  Chronic conditions include diabetes, hypertension and hyperlipidemia.      Prescription drug monitoring program review:         PAST MEDICAL HISTORY  Active Ambulatory Problems     Diagnosis Date Noted    Airway hyperreactivity 02/29/2016    Bronchitis 02/29/2016    Cervical radiculitis 02/29/2016    Cervical pain 02/29/2016    CD (contact dermatitis) 02/29/2016    DDD (degenerative disc disease), cervical 02/29/2016    Diabetes 02/29/2016    DM II (diabetes mellitus, type II), controlled 02/29/2016    DD (diverticular disease) 02/29/2016    BP (high blood pressure) 02/29/2016    HLD (hyperlipidemia) 02/29/2016    Adverse effect of motion 02/29/2016    Allergic dermatitis due to poison ivy 02/29/2016    Borderline diabetes  02/29/2016    Eczema 02/29/2016    Encounter for screening for malignant neoplasm of colon 02/29/2016    Diverticulitis of large intestine 04/20/2016    Health care maintenance 08/29/2016    Hydrochlorothiazide adverse reaction 11/17/2017    Diarrhea 07/26/2022    Clostridioides difficile diarrhea 08/02/2022    MAXIM (acute kidney injury) 08/02/2022    Hypotension 08/02/2022    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 ENDOSCOPY;  Service:     COLONOSCOPY N/A 3/7/2022    Procedure: COLONOSCOPY TO CECUM AND TO TI WITH COLD BIOPSIES;  Surgeon: Shad Zambrano MD;  Location: [facility] ENDOSCOPY;  Service: Gastroenterology;  Laterality: N/A;  pre: history of diverticulitis and diarrhea  post: diverticulosis and hemorrhoids    ENDOSCOPY N/A 5/13/2024    Procedure: ESOPHAGOGASTRODUODENOSCOPY WITH COLD BIOPSIES AND BALLOON DILATATION AT 15, 16.5 AND 18;  Surgeon: Shad Zambrano MD;  Location: [facility] ENDOSCOPY;  Service: Gastroenterology;  Laterality: N/A;  PRE- DYSPHAGIA  POST- GASTRITIS, HIATAL HERNIA, SCHATZKIS RING         FAMILY HISTORY  Family History   Problem Relation Age of Onset    Hypertension Father     Alzheimer's disease Mother     Hypertension Brother     Diabetes Brother     Hypertension Brother          SOCIAL HISTORY  Social History     Socioeconomic History    Marital status:    Tobacco Use    Smoking status: Never    Smokeless  tobacco: Never   Vaping Use    Vaping status: Never Used   Substance and Sexual Activity    Alcohol use: Yes     Alcohol/week: 105.0 standard drinks of alcohol     Types: 105 Shots of liquor per week     Comment: has 2-3 5oz glasses of whiskey daily    Drug use: No    Sexual activity: Yes     Partners: Female         ALLERGIES  Hydrochlorothiazide      REVIEW OF SYSTEMS  Review of Systems   Constitutional:  Negative for fever.   Respiratory:  Negative for shortness of breath.    Cardiovascular:  Negative for chest pain.   Genitourinary:  Positive for flank pain.   All other systems reviewed and are negative.    Included in HPI  All systems reviewed and negative except for those discussed in HPI.      PHYSICAL EXAM    I have reviewed the triage vital signs and nursing notes.    ED Triage Vitals   Temp Heart Rate Resp BP SpO2   03/11/25 1908 03/11/25 1908 03/11/25 1908 03/11/25 1909 03/11/25 1908   97.8 °F (36.6 °C) 113 18 175/95 100 %      Temp src Heart Rate Source Patient Position BP Location FiO2 (%)   03/11/25 1908 03/11/25 1908 -- -- --   Tympanic Monitor          Physical Exam  GENERAL: Alert male in mild distress.  Triage vitals reviewed notable for slight tachycardia with pulse of 113.  Temperature and O2 sats are normal.  Blood pressure 175/95  SKIN: Warm, dry  HENT: Normocephalic, atraumatic  EYES: no scleral icterus  CV: regular rhythm, regular rate  RESPIRATORY: normal effort, lungs clear  ABDOMEN: soft, nontender, nondistended  MUSCULOSKELETAL: no deformity  NEURO: alert, moves all extremities, follows commands      LAB RESULTS  Recent Results (from the past 24 hours)   Comprehensive Metabolic Panel    Collection Time: 03/11/25  7:15 PM    Specimen: Blood   Result Value Ref Range    Glucose 179 (H) 65 - 99 mg/dL    BUN 22 8 - 23 mg/dL    Creatinine 1.19 0.76 - 1.27 mg/dL    Sodium 140 136 - 145 mmol/L    Potassium 4.7 3.5 - 5.2 mmol/L    Chloride 102 98 - 107 mmol/L    CO2 22.0 22.0 - 29.0 mmol/L     Calcium 10.0 8.6 - 10.5 mg/dL    Total Protein 7.8 6.0 - 8.5 g/dL    Albumin 4.9 3.5 - 5.2 g/dL    ALT (SGPT) 55 (H) 1 - 41 U/L    AST (SGOT) 34 1 - 40 U/L    Alkaline Phosphatase 73 39 - 117 U/L    Total Bilirubin 0.4 0.0 - 1.2 mg/dL    Globulin 2.9 gm/dL    A/G Ratio 1.7 g/dL    BUN/Creatinine Ratio 18.5 7.0 - 25.0    Anion Gap 16.0 (H) 5.0 - 15.0 mmol/L    eGFR 65.7 >60.0 mL/min/1.73   Lipase    Collection Time: 03/11/25  7:15 PM    Specimen: Blood   Result Value Ref Range    Lipase 75 (H) 13 - 60 U/L   Lactic Acid, Plasma    Collection Time: 03/11/25  7:15 PM    Specimen: Blood   Result Value Ref Range    Lactate 2.3 (C) 0.5 - 2.0 mmol/L   Green Top (Gel)    Collection Time: 03/11/25  7:15 PM   Result Value Ref Range    Extra Tube Hold for add-ons.    Lavender Top    Collection Time: 03/11/25  7:15 PM   Result Value Ref Range    Extra Tube hold for add-on    Gold Top - SST    Collection Time: 03/11/25  7:15 PM   Result Value Ref Range    Extra Tube Hold for add-ons.    Light Blue Top    Collection Time: 03/11/25  7:15 PM   Result Value Ref Range    Extra Tube Hold for add-ons.    CBC Auto Differential    Collection Time: 03/11/25  7:15 PM    Specimen: Blood   Result Value Ref Range    WBC 7.16 3.40 - 10.80 10*3/mm3    RBC 4.34 4.14 - 5.80 10*6/mm3    Hemoglobin 13.8 13.0 - 17.7 g/dL    Hematocrit 40.6 37.5 - 51.0 %    MCV 93.5 79.0 - 97.0 fL    MCH 31.8 26.6 - 33.0 pg    MCHC 34.0 31.5 - 35.7 g/dL    RDW 12.0 (L) 12.3 - 15.4 %    RDW-SD 41.2 37.0 - 54.0 fl    MPV 9.8 6.0 - 12.0 fL    Platelets 246 140 - 450 10*3/mm3    Neutrophil % 58.3 42.7 - 76.0 %    Lymphocyte % 26.4 19.6 - 45.3 %    Monocyte % 11.3 5.0 - 12.0 %    Eosinophil % 1.5 0.3 - 6.2 %    Basophil % 1.0 0.0 - 1.5 %    Immature Grans % 1.5 (H) 0.0 - 0.5 %    Neutrophils, Absolute 4.17 1.70 - 7.00 10*3/mm3    Lymphocytes, Absolute 1.89 0.70 - 3.10 10*3/mm3    Monocytes, Absolute 0.81 0.10 - 0.90 10*3/mm3    Eosinophils, Absolute 0.11 0.00 - 0.40  10*3/mm3    Basophils, Absolute 0.07 0.00 - 0.20 10*3/mm3    Immature Grans, Absolute 0.11 (H) 0.00 - 0.05 10*3/mm3    nRBC 0.0 0.0 - 0.2 /100 WBC   STAT Lactic Acid, Reflex    Collection Time: 03/11/25 10:53 PM    Specimen: Blood   Result Value Ref Range    Lactate 0.9 0.5 - 2.0 mmol/L         RADIOLOGY  CT Abdomen Pelvis Without Contrast  Result Date: 3/11/2025  CT ABDOMEN AND PELVIS WITHOUT CONTRAST:  HISTORY: Left groin pain, suspect kidney stone  TECHNIQUE: Helical CT was performed of the abdomen and pelvis from the lung bases through the lesser trochanters without IV contrast. Reformatted images were provided in the coronal and sagittal planes. Radiation dose reduction techniques were utilized, including automated exposure control, and exposure modulation based on body size.  COMPARISON: CT abdomen pelvis 6/1/2024  FINDINGS:  Lung bases: Visualized lung bases are unremarkable.  Abdomen: There is been prior cholecystectomy. There is fatty infiltration of the liver, but no evidence to suggest biliary obstruction or hepatic mass on these unenhanced images. The spleen and pancreas appear normal.  Both adrenal glands are normal. There is no evidence of bowel obstruction. The aorta is normal in caliber.   There is a right renal lower pole 1 mm nonobstructing calculus, but the right kidney is otherwise normal. There is a large left renal simple cyst, and there is a left renal 1 mm nonobstructing calculus. There is mild left hydronephrosis process, and while there is no ureterolithiasis, there is a 1 mm calculus in the base of the urinary bladder.  Pelvis:  There is no pelvic inflammatory change or other abnormal fluid collection.  There is no pelvic adenopathy or other soft tissue mass. There is no CT evidence of hernia or bowel obstruction.  There is no acute bony abnormality.       FINDINGS suggest a recently passed 1 mm left renal calculus.     This report was finalized on 3/11/2025 11:57 PM by Dr. Med Parker,  M.D on Workstation: [id number]          MEDICATIONS GIVEN IN ER  Medications   sodium chloride 0.9 % flush 10 mL (has no administration in time range)   HYDROmorphone (DILAUDID) injection 1 mg (1 mg Intravenous Given 3/11/25 2044)   ondansetron (ZOFRAN) injection 4 mg (4 mg Intravenous Given 3/11/25 2044)   sodium chloride 0.9 % bolus 500 mL (0 mL Intravenous Stopped 3/11/25 2114)   ketorolac (TORADOL) injection 15 mg (15 mg Intravenous Given 3/11/25 2132)         ORDERS PLACED DURING THIS VISIT:  Orders Placed This Encounter   Procedures    CT Abdomen Pelvis Without Contrast    [place] Draw    Comprehensive Metabolic Panel    Lipase    Urinalysis With Microscopic If Indicated (No Culture) - Urine, Clean Catch    Lactic Acid, Plasma    CBC Auto Differential    STAT Lactic Acid, Reflex    NPO Diet NPO Type: Strict NPO    Undress & Gown    Insert Peripheral IV    CBC & Differential    Green Top (Gel)    Lavender Top    Gold Top - SST    Light Blue Top         OUTPATIENT MEDICATION MANAGEMENT:  Current Facility-Administered Medications Ordered in Epic   Medication Dose Route Frequency Provider Last Rate Last Admin    sodium chloride 0.9 % flush 10 mL  10 mL Intravenous PRN Enoch Grant MD         Current Outpatient Medications Ordered in Epic   Medication Sig Dispense Refill    Accu-Chek Guide test strip       Accu-Chek Softclix Lancets lancets       albuterol (PROAIR HFA) 108 (90 Base) MCG/ACT inhaler Inhale 2 puffs Every 6 (Six) Hours As Needed for Wheezing. 5 inhaler 3    amLODIPine-valsartan (EXFORGE)  MG per tablet Daily.      atorvastatin (LIPITOR) 40 MG tablet TAKE 1 TABLET DAILY 90 tablet 1    budesonide-formoterol (SYMBICORT) 160-4.5 MCG/ACT inhaler Inhale 2 puffs As Needed.      cloNIDine (CATAPRES) 0.2 MG tablet Take 2 tablets by mouth Daily.      ezetimibe (ZETIA) 10 MG tablet Take 1 tablet by mouth Daily.      ibuprofen (ADVIL,MOTRIN) 600 MG tablet Take 1 tablet by mouth Every 6 (Six) Hours As  Needed for Mild Pain. 21 tablet 0    Januvia 100 MG tablet       metFORMIN (GLUCOPHAGE) 500 MG tablet Take 2 tablets by mouth Daily.      metFORMIN (GLUCOPHAGE) 500 MG tablet Take 1 tablet by mouth Every Night.      multivitamin with minerals (MULTIVITAL PO) Take 1 tablet by mouth Daily. One a Day      nortriptyline (PAMELOR) 10 MG capsule Take 1 capsule by mouth Every Night. 30 capsule 11    oseltamivir (Tamiflu) 75 MG capsule 1 capsule PO BID 10 capsule 0    pantoprazole (PROTONIX) 40 MG EC tablet Take 1 tablet by mouth Daily. 90 tablet 3    promethazine-dextromethorphan (PROMETHAZINE-DM) 6.25-15 MG/5ML syrup 5 ml PO Q 4-6 hours prn cough.  Max:  30 ml per 24 hours. 240 mL 0         PROCEDURES  Procedures            PROGRESS, DATA ANALYSIS, CONSULTS, AND MEDICAL DECISION MAKING  All labs have been independently interpreted by me.  All radiology studies have been reviewed by me. All EKG's have been independently viewed and interpreted by me.  Discussion below represents my analysis of pertinent findings related to patient's condition, differential diagnosis, treatment plan and final disposition.    Differential diagnosis includes but is not limited to kidney stone seems most likely, also would consider colitis, diverticulitis and musculoskeletal strain..                 AS OF 00:10 EDT VITALS:    BP - 175/95  HR - 113  TEMP - 97.8 °F (36.6 °C) (Tympanic)  O2 SATS - 100%    COMPLEXITY OF CARE  69-year-old male with history of diabetes, hypertension and hyperlipidemia presents with left flank and groin pain which began this afternoon.  Patient states pain is similar to prior kidney stone.    I did independently interpret and evaluate all ED testing including Emergency Department laboratory analysis and CT scan of the abdomen and are notable for the following:    CBC benign without evidence of infection or anemia  Chemistries fairly unremarkable without significant hepatic or renal failure  Lipase of 75 although  history and exam is not suggestive of pancreatitis  Lactic acid 2.3 as noted although patient does not show signs of underlying infection and I do not believe that there is underlying sepsis.    Patient was treated with IV Dilaudid, Zofran and IV fluids with improvement of symptoms.    CT imaging independently interpreted by me showed 1 mm stone in the urinary bladder consistent with likely passed stone.  Patient did have significant improvement of the pain after medications and I think he is through the worst of this.    DIAGNOSIS  Final diagnoses:   Kidney stone         DISPOSITION  ED Disposition       ED Disposition   Discharge    Condition   Stable    Comment   --                Please note that portions of this document were completed with a voice recognition program.    Note Disclaimer: At [facility], we believe that sharing information builds trust and better relationships. You are receiving this note because you recently visited [facility]. It is possible you will see health information before a provider has talked with you about it. This kind of information can be easy to misunderstand. To help you fully understand what it means for your health, we urge you to discuss this note with your provider.         Enoch Grant MD  03/12/25 0010

## 2025-05-18 ENCOUNTER — HOSPITAL ENCOUNTER (EMERGENCY)
Facility: HOSPITAL | Age: 70
Discharge: HOME | End: 2025-05-18
Attending: EMERGENCY MEDICINE | Admitting: EMERGENCY MEDICINE
Payer: MEDICARE

## 2025-05-18 VITALS
HEART RATE: 79 BPM | TEMPERATURE: 97.4 F | OXYGEN SATURATION: 98 % | RESPIRATION RATE: 16 BRPM | SYSTOLIC BLOOD PRESSURE: 161 MMHG | WEIGHT: 210 LBS | HEIGHT: 70 IN | BODY MASS INDEX: 30.06 KG/M2 | DIASTOLIC BLOOD PRESSURE: 85 MMHG

## 2025-05-18 DIAGNOSIS — T20.20XA PARTIAL THICKNESS BURN OF FACE, INITIAL ENCOUNTER: Primary | ICD-10-CM

## 2025-05-18 DIAGNOSIS — T22.111A SUPERFICIAL BURN OF RIGHT FOREARM, INITIAL ENCOUNTER: ICD-10-CM

## 2025-05-18 LAB
ALBUMIN SERPL-MCNC: 5 G/DL (ref 3.5–5.2)
ALBUMIN/GLOB SERPL: 2 G/DL
ALP SERPL-CCNC: 72 U/L (ref 39–117)
ALT SERPL W P-5'-P-CCNC: 61 U/L (ref 1–41)
ANION GAP SERPL CALCULATED.3IONS-SCNC: 13.7 MMOL/L (ref 5–15)
AST SERPL-CCNC: 44 U/L (ref 1–40)
BASOPHILS # BLD AUTO: 0.08 10*3/MM3 (ref 0–0.2)
BASOPHILS NFR BLD AUTO: 0.8 % (ref 0–1.5)
BILIRUB SERPL-MCNC: 0.6 MG/DL (ref 0–1.2)
BUN SERPL-MCNC: 20 MG/DL (ref 8–23)
BUN/CREAT SERPL: 16.3 (ref 7–25)
CALCIUM SPEC-SCNC: 10.2 MG/DL (ref 8.6–10.5)
CHLORIDE SERPL-SCNC: 102 MMOL/L (ref 98–107)
CO2 SERPL-SCNC: 19.3 MMOL/L (ref 22–29)
CREAT SERPL-MCNC: 1.23 MG/DL (ref 0.76–1.27)
DEPRECATED RDW RBC AUTO: 44.6 FL (ref 37–54)
EGFRCR SERPLBLD CKD-EPI 2021: 62.8 ML/MIN/1.73
EOSINOPHIL # BLD AUTO: 0.26 10*3/MM3 (ref 0–0.4)
EOSINOPHIL NFR BLD AUTO: 2.7 % (ref 0.3–6.2)
ERYTHROCYTE [DISTWIDTH] IN BLOOD BY AUTOMATED COUNT: 12.4 % (ref 12.3–15.4)
GLOBULIN UR ELPH-MCNC: 2.5 GM/DL
GLUCOSE SERPL-MCNC: 148 MG/DL (ref 65–99)
HCT VFR BLD AUTO: 44.2 % (ref 37.5–51)
HGB BLD-MCNC: 14.6 G/DL (ref 13–17.7)
IMM GRANULOCYTES # BLD AUTO: 0.17 10*3/MM3 (ref 0–0.05)
IMM GRANULOCYTES NFR BLD AUTO: 1.8 % (ref 0–0.5)
LYMPHOCYTES # BLD AUTO: 3.6 10*3/MM3 (ref 0.7–3.1)
LYMPHOCYTES NFR BLD AUTO: 37.3 % (ref 19.6–45.3)
MCH RBC QN AUTO: 32.3 PG (ref 26.6–33)
MCHC RBC AUTO-ENTMCNC: 33 G/DL (ref 31.5–35.7)
MCV RBC AUTO: 97.8 FL (ref 79–97)
MONOCYTES # BLD AUTO: 1.15 10*3/MM3 (ref 0.1–0.9)
MONOCYTES NFR BLD AUTO: 11.9 % (ref 5–12)
NEUTROPHILS NFR BLD AUTO: 4.4 10*3/MM3 (ref 1.7–7)
NEUTROPHILS NFR BLD AUTO: 45.5 % (ref 42.7–76)
NRBC BLD AUTO-RTO: 0 /100 WBC (ref 0–0.2)
PLATELET # BLD AUTO: 242 10*3/MM3 (ref 140–450)
PMV BLD AUTO: 9.9 FL (ref 6–12)
POTASSIUM SERPL-SCNC: 4.5 MMOL/L (ref 3.5–5.2)
PROT SERPL-MCNC: 7.5 G/DL (ref 6–8.5)
RBC # BLD AUTO: 4.52 10*6/MM3 (ref 4.14–5.8)
SODIUM SERPL-SCNC: 135 MMOL/L (ref 136–145)
WBC NRBC COR # BLD AUTO: 9.66 10*3/MM3 (ref 3.4–10.8)

## 2025-05-18 PROCEDURE — 25810000003 LACTATED RINGERS SOLUTION

## 2025-05-18 PROCEDURE — 96376 TX/PRO/DX INJ SAME DRUG ADON: CPT

## 2025-05-18 PROCEDURE — 25010000002 MORPHINE PER 10 MG

## 2025-05-18 PROCEDURE — 96374 THER/PROPH/DIAG INJ IV PUSH: CPT

## 2025-05-18 PROCEDURE — 36415 COLL VENOUS BLD VENIPUNCTURE: CPT

## 2025-05-18 PROCEDURE — 85025 COMPLETE CBC W/AUTO DIFF WBC: CPT

## 2025-05-18 PROCEDURE — 80053 COMPREHEN METABOLIC PANEL: CPT

## 2025-05-18 PROCEDURE — 99283 EMERGENCY DEPT VISIT LOW MDM: CPT

## 2025-05-18 RX ORDER — MORPHINE SULFATE 2 MG/ML
4 INJECTION, SOLUTION INTRAMUSCULAR; INTRAVENOUS ONCE
Status: COMPLETED | OUTPATIENT
Start: 2025-05-18 | End: 2025-05-18

## 2025-05-18 RX ORDER — GINSENG 100 MG
1 CAPSULE ORAL 2 TIMES DAILY
Qty: 85.2 G | Refills: 0 | Status: SHIPPED | OUTPATIENT
Start: 2025-05-18 | End: 2025-06-11

## 2025-05-18 RX ORDER — HYDROCODONE BITARTRATE AND ACETAMINOPHEN 5; 325 MG/1; MG/1
1 TABLET ORAL EVERY 6 HOURS PRN
Qty: 12 TABLET | Refills: 0 | Status: SHIPPED | OUTPATIENT
Start: 2025-05-18

## 2025-05-18 RX ORDER — GINSENG 100 MG
1 CAPSULE ORAL EVERY 8 HOURS SCHEDULED
Status: DISCONTINUED | OUTPATIENT
Start: 2025-05-18 | End: 2025-05-19 | Stop reason: HOSPADM

## 2025-05-18 RX ORDER — HYDROCODONE BITARTRATE AND ACETAMINOPHEN 7.5; 325 MG/1; MG/1
1 TABLET ORAL ONCE
Refills: 0 | Status: COMPLETED | OUTPATIENT
Start: 2025-05-18 | End: 2025-05-18

## 2025-05-18 RX ADMIN — HYDROCODONE BITARTRATE AND ACETAMINOPHEN 1 TABLET: 7.5; 325 TABLET ORAL at 23:19

## 2025-05-18 RX ADMIN — BACITRACIN 0.9 G: 500 OINTMENT TOPICAL at 22:21

## 2025-05-18 RX ADMIN — SODIUM CHLORIDE, POTASSIUM CHLORIDE, SODIUM LACTATE AND CALCIUM CHLORIDE 1000 ML: 600; 310; 30; 20 INJECTION, SOLUTION INTRAVENOUS at 20:35

## 2025-05-18 RX ADMIN — MORPHINE SULFATE 4 MG: 2 INJECTION, SOLUTION INTRAMUSCULAR; INTRAVENOUS at 20:34

## 2025-05-18 RX ADMIN — MORPHINE SULFATE 4 MG: 2 INJECTION, SOLUTION INTRAMUSCULAR; INTRAVENOUS at 22:28
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
## 2025-06-04 ENCOUNTER — TRANSCRIBE ORDERS (OUTPATIENT)
Dept: ADMINISTRATIVE | Facility: HOSPITAL | Age: 70
End: 2025-06-04
Payer: MEDICARE

## 2025-06-04 DIAGNOSIS — R91.1 LUNG NODULE: Primary | ICD-10-CM

## 2025-07-02 ENCOUNTER — HOSPITAL ENCOUNTER (OUTPATIENT)
Dept: CT IMAGING | Facility: HOSPITAL | Age: 70
Discharge: HOME | End: 2025-07-02
Admitting: INTERNAL MEDICINE
Payer: MEDICARE

## 2025-07-02 DIAGNOSIS — R91.1 LUNG NODULE: ICD-10-CM

## 2025-07-02 PROCEDURE — 71250 CT THORAX DX C-: CPT

## 2025-08-14 ENCOUNTER — HOSPITAL ENCOUNTER (OUTPATIENT)
Dept: ULTRASOUND IMAGING | Facility: HOSPITAL | Age: 70
Discharge: HOME | End: 2025-08-14
Admitting: INTERNAL MEDICINE
Payer: MEDICARE

## 2025-08-14 ENCOUNTER — TRANSCRIBE ORDERS (OUTPATIENT)
Dept: ADMINISTRATIVE | Facility: HOSPITAL | Age: 70
End: 2025-08-14
Payer: MEDICARE

## 2025-08-14 DIAGNOSIS — R10.12 ABDOMINAL PAIN, LEFT UPPER QUADRANT: ICD-10-CM

## 2025-08-14 DIAGNOSIS — N17.9 ACUTE RENAL FAILURE, UNSPECIFIED ACUTE RENAL FAILURE TYPE: Primary | ICD-10-CM

## 2025-08-14 DIAGNOSIS — N17.9 ACUTE RENAL FAILURE, UNSPECIFIED ACUTE RENAL FAILURE TYPE: ICD-10-CM

## 2025-08-14 PROCEDURE — 76775 US EXAM ABDO BACK WALL LIM: CPT

## 2025-08-19 ENCOUNTER — TRANSCRIBE ORDERS (OUTPATIENT)
Dept: ADMINISTRATIVE | Facility: HOSPITAL | Age: 70
End: 2025-08-19
Payer: MEDICARE

## 2025-08-19 DIAGNOSIS — N28.9 URETERAL SLUDGE: ICD-10-CM

## 2025-08-19 DIAGNOSIS — R10.12 ABDOMINAL PAIN, LEFT UPPER QUADRANT: Primary | ICD-10-CM

## 2025-08-27 ENCOUNTER — TRANSCRIBE ORDERS (OUTPATIENT)
Dept: ADMINISTRATIVE | Facility: HOSPITAL | Age: 70
End: 2025-08-27
Payer: MEDICARE

## 2025-08-27 DIAGNOSIS — R10.12 ABDOMINAL PAIN, LEFT UPPER QUADRANT: Primary | ICD-10-CM

## 2025-08-27 DIAGNOSIS — N28.9 URETERAL SLUDGE: ICD-10-CM

## 2025-08-28 ENCOUNTER — HOSPITAL ENCOUNTER (OUTPATIENT)
Dept: CT IMAGING | Facility: HOSPITAL | Age: 70
Discharge: HOME | End: 2025-08-28
Admitting: INTERNAL MEDICINE
Payer: MEDICARE

## 2025-08-28 DIAGNOSIS — R10.12 ABDOMINAL PAIN, LEFT UPPER QUADRANT: ICD-10-CM

## 2025-08-28 DIAGNOSIS — N28.9 URETERAL SLUDGE: ICD-10-CM

## 2025-08-28 PROCEDURE — 25510000001 IOPAMIDOL 61 % SOLUTION: Performed by: INTERNAL MEDICINE

## 2025-08-28 PROCEDURE — 82565 ASSAY OF CREATININE: CPT

## 2025-08-28 PROCEDURE — 74160 CT ABDOMEN W/CONTRAST: CPT

## 2025-08-28 PROCEDURE — 25510000002 DIATRIZOATE MEGLUMINE & SODIUM PER 1 ML: Performed by: INTERNAL MEDICINE

## 2025-08-28 RX ORDER — DIATRIZOATE MEGLUMINE AND DIATRIZOATE SODIUM 660; 100 MG/ML; MG/ML
30 SOLUTION ORAL; RECTAL
Status: COMPLETED | OUTPATIENT
Start: 2025-08-28 | End: 2025-08-28

## 2025-08-28 RX ORDER — IOPAMIDOL 612 MG/ML
100 INJECTION, SOLUTION INTRAVASCULAR
Status: COMPLETED | OUTPATIENT
Start: 2025-08-28 | End: 2025-08-28

## 2025-08-28 RX ADMIN — DIATRIZOATE MEGLUMINE AND DIATRIZOATE SODIUM 30 ML: 660; 100 LIQUID ORAL; RECTAL at 12:00

## 2025-08-28 RX ADMIN — IOPAMIDOL 85 ML: 612 INJECTION, SOLUTION INTRAVENOUS at 13:23

## 2025-08-29 LAB — CREAT BLDA-MCNC: 1 MG/DL (ref 0.6–1.3)

## (undated) DEVICE — BLCK/BITE BLOX W/DENTL/RIM W/STRAP 54F

## (undated) DEVICE — TUBING, SUCTION, 1/4" X 10', STRAIGHT: Brand: MEDLINE

## (undated) DEVICE — SENSR O2 OXIMAX FNGR A/ 18IN NONSTR

## (undated) DEVICE — FRCP BX RADJAW4 NDL 2.8 240CM LG OG BX40

## (undated) DEVICE — ADAPT CLN BIOGUARD AIR/H2O DISP

## (undated) DEVICE — KT ORCA ORCAPOD DISP STRL

## (undated) DEVICE — LN SMPL CO2 SHTRM SD STREAM W/M LUER

## (undated) DEVICE — MSK ENDO PORT O2 POM ELITE CURAPLEX A/

## (undated) DEVICE — ESOPHAGEAL BALLOON DILATATION CATHETER: Brand: CRE FIXED WIRE

## (undated) DEVICE — CANN O2 ETCO2 FITS ALL CONN CO2 SMPL A/ 7IN DISP LF

## (undated) DEVICE — DEV INFL CRE STERIFLATE 60CC DISP

## (undated) DEVICE — SINGLE-USE BIOPSY FORCEPS: Brand: RADIAL JAW 4